# Patient Record
Sex: MALE | Race: WHITE | NOT HISPANIC OR LATINO | Employment: OTHER | URBAN - METROPOLITAN AREA
[De-identification: names, ages, dates, MRNs, and addresses within clinical notes are randomized per-mention and may not be internally consistent; named-entity substitution may affect disease eponyms.]

---

## 2019-08-01 ENCOUNTER — CLINICAL SUPPORT (OUTPATIENT)
Dept: CARDIAC REHAB | Facility: HOSPITAL | Age: 74
End: 2019-08-01

## 2019-08-01 DIAGNOSIS — Z78.9 PARTICIPANT IN HEALTH AND WELLNESS PLAN: ICD-10-CM

## 2019-08-01 PROCEDURE — 94300003 HC OP CARDIAC REHAB  - PHASE III SESSION

## 2019-08-07 ENCOUNTER — CLINICAL SUPPORT (OUTPATIENT)
Dept: CARDIAC REHAB | Facility: HOSPITAL | Age: 74
End: 2019-08-07

## 2019-08-09 ENCOUNTER — CLINICAL SUPPORT (OUTPATIENT)
Dept: CARDIAC REHAB | Facility: HOSPITAL | Age: 74
End: 2019-08-09

## 2019-08-12 ENCOUNTER — CLINICAL SUPPORT (OUTPATIENT)
Dept: CARDIAC REHAB | Facility: HOSPITAL | Age: 74
End: 2019-08-12

## 2019-08-12 DIAGNOSIS — Z78.9 PARTICIPANT IN HEALTH AND WELLNESS PLAN: Primary | ICD-10-CM

## 2019-08-19 ENCOUNTER — CLINICAL SUPPORT (OUTPATIENT)
Dept: CARDIAC REHAB | Facility: HOSPITAL | Age: 74
End: 2019-08-19

## 2019-08-19 DIAGNOSIS — Z78.9 PARTICIPANT IN HEALTH AND WELLNESS PLAN: Primary | ICD-10-CM

## 2019-08-23 ENCOUNTER — CLINICAL SUPPORT (OUTPATIENT)
Dept: CARDIAC REHAB | Facility: HOSPITAL | Age: 74
End: 2019-08-23

## 2019-08-23 DIAGNOSIS — Z78.9 PARTICIPANT IN HEALTH AND WELLNESS PLAN: Primary | ICD-10-CM

## 2019-08-23 PROCEDURE — 94300004 HC CARDIAC REHAB PHASE III, MONTHLY SESSION

## 2019-08-26 ENCOUNTER — CLINICAL SUPPORT (OUTPATIENT)
Dept: CARDIAC REHAB | Facility: HOSPITAL | Age: 74
End: 2019-08-26

## 2019-08-26 DIAGNOSIS — Z78.9 PARTICIPANT IN HEALTH AND WELLNESS PLAN: Primary | ICD-10-CM

## 2019-09-04 ENCOUNTER — CLINICAL SUPPORT (OUTPATIENT)
Dept: CARDIAC REHAB | Facility: HOSPITAL | Age: 74
End: 2019-09-04

## 2019-09-04 DIAGNOSIS — Z78.9 PARTICIPANT IN HEALTH AND WELLNESS PLAN: Primary | ICD-10-CM

## 2019-09-04 PROCEDURE — 94300004 HC CARDIAC REHAB PHASE III, MONTHLY SESSION

## 2019-09-09 ENCOUNTER — CLINICAL SUPPORT (OUTPATIENT)
Dept: CARDIAC REHAB | Facility: HOSPITAL | Age: 74
End: 2019-09-09

## 2019-09-09 DIAGNOSIS — Z78.9 PARTICIPANT IN HEALTH AND WELLNESS PLAN: Primary | ICD-10-CM

## 2019-10-04 ENCOUNTER — CLINICAL SUPPORT (OUTPATIENT)
Dept: FAMILY MEDICINE | Facility: CLINIC | Age: 74
End: 2019-10-04
Payer: MEDICARE

## 2019-10-04 VITALS — TEMPERATURE: 98 F

## 2019-10-04 DIAGNOSIS — Z23 FLU VACCINE NEED: Primary | ICD-10-CM

## 2019-10-04 PROCEDURE — 90662 FLU VACCINE - HIGH DOSE (65+) PRESERVATIVE FREE IM: ICD-10-PCS | Mod: S$GLB,,, | Performed by: FAMILY MEDICINE

## 2019-10-04 PROCEDURE — G0008 ADMIN INFLUENZA VIRUS VAC: HCPCS | Mod: S$GLB,,, | Performed by: FAMILY MEDICINE

## 2019-10-04 PROCEDURE — 90662 IIV NO PRSV INCREASED AG IM: CPT | Mod: S$GLB,,, | Performed by: FAMILY MEDICINE

## 2019-10-04 PROCEDURE — G0008 FLU VACCINE - HIGH DOSE (65+) PRESERVATIVE FREE IM: ICD-10-PCS | Mod: S$GLB,,, | Performed by: FAMILY MEDICINE

## 2019-10-07 ENCOUNTER — CLINICAL SUPPORT (OUTPATIENT)
Dept: CARDIAC REHAB | Facility: HOSPITAL | Age: 74
End: 2019-10-07

## 2019-10-07 DIAGNOSIS — Z78.9 PARTICIPANT IN HEALTH AND WELLNESS PLAN: ICD-10-CM

## 2019-10-07 PROCEDURE — 94300004 HC CARDIAC REHAB PHASE III, MONTHLY SESSION

## 2019-11-04 ENCOUNTER — CLINICAL SUPPORT (OUTPATIENT)
Dept: CARDIAC REHAB | Facility: HOSPITAL | Age: 74
End: 2019-11-04

## 2019-11-04 DIAGNOSIS — Z78.9 PARTICIPANT IN HEALTH AND WELLNESS PLAN: ICD-10-CM

## 2019-11-04 PROCEDURE — 94300004 HC CARDIAC REHAB PHASE III, MONTHLY SESSION

## 2019-12-06 ENCOUNTER — CLINICAL SUPPORT (OUTPATIENT)
Dept: CARDIAC REHAB | Facility: HOSPITAL | Age: 74
End: 2019-12-06

## 2019-12-06 DIAGNOSIS — Z78.9 PARTICIPANT IN HEALTH AND WELLNESS PLAN: ICD-10-CM

## 2019-12-06 PROCEDURE — 94300004 HC CARDIAC REHAB PHASE III, MONTHLY SESSION

## 2019-12-13 RX ORDER — OMEPRAZOLE 20 MG/1
20 CAPSULE, DELAYED RELEASE ORAL DAILY
Qty: 90 CAPSULE | Refills: 1 | Status: SHIPPED | OUTPATIENT
Start: 2019-12-13 | End: 2020-07-30 | Stop reason: SDUPTHER

## 2019-12-13 NOTE — TELEPHONE ENCOUNTER
----- Message from Doris Flores sent at 12/13/2019  9:49 AM CST -----  Contact: Jose  Refill omeprazole 20 mg # 90 with refills. [facility]. pts # [phone]

## 2020-01-08 ENCOUNTER — CLINICAL SUPPORT (OUTPATIENT)
Dept: CARDIAC REHAB | Facility: HOSPITAL | Age: 75
End: 2020-01-08

## 2020-01-08 DIAGNOSIS — Z78.9 PARTICIPANT IN HEALTH AND WELLNESS PLAN: ICD-10-CM

## 2020-01-08 PROCEDURE — 94300004 HC CARDIAC REHAB PHASE III, MONTHLY SESSION

## 2020-01-16 ENCOUNTER — PATIENT MESSAGE (OUTPATIENT)
Dept: FAMILY MEDICINE | Facility: CLINIC | Age: 75
End: 2020-01-16

## 2020-01-16 ENCOUNTER — TELEPHONE (OUTPATIENT)
Dept: FAMILY MEDICINE | Facility: CLINIC | Age: 75
End: 2020-01-16

## 2020-01-16 DIAGNOSIS — Z79.899 ENCOUNTER FOR LONG-TERM (CURRENT) USE OF OTHER MEDICATIONS: ICD-10-CM

## 2020-01-16 DIAGNOSIS — Z00.00 ROUTINE GENERAL MEDICAL EXAMINATION AT A HEALTH CARE FACILITY: Primary | ICD-10-CM

## 2020-01-16 DIAGNOSIS — Z12.5 SPECIAL SCREENING FOR MALIGNANT NEOPLASM OF PROSTATE: ICD-10-CM

## 2020-01-17 ENCOUNTER — OFFICE VISIT (OUTPATIENT)
Dept: FAMILY MEDICINE | Facility: CLINIC | Age: 75
End: 2020-01-17
Payer: MEDICARE

## 2020-01-17 VITALS
WEIGHT: 221.19 LBS | SYSTOLIC BLOOD PRESSURE: 118 MMHG | TEMPERATURE: 99 F | HEIGHT: 73 IN | HEART RATE: 72 BPM | DIASTOLIC BLOOD PRESSURE: 66 MMHG | BODY MASS INDEX: 29.31 KG/M2

## 2020-01-17 DIAGNOSIS — J10.1 INFLUENZA A: ICD-10-CM

## 2020-01-17 DIAGNOSIS — R68.89 FLU-LIKE SYMPTOMS: Primary | ICD-10-CM

## 2020-01-17 LAB
CTP QC/QA: YES
POC MOLECULAR INFLUENZA A AGN: POSITIVE
POC MOLECULAR INFLUENZA B AGN: NEGATIVE

## 2020-01-17 PROCEDURE — 99213 PR OFFICE/OUTPT VISIT, EST, LEVL III, 20-29 MIN: ICD-10-PCS | Mod: S$GLB,,, | Performed by: PHYSICIAN ASSISTANT

## 2020-01-17 PROCEDURE — 1125F AMNT PAIN NOTED PAIN PRSNT: CPT | Mod: S$GLB,,, | Performed by: PHYSICIAN ASSISTANT

## 2020-01-17 PROCEDURE — 87502 INFLUENZA DNA AMP PROBE: CPT | Mod: QW,,, | Performed by: PHYSICIAN ASSISTANT

## 2020-01-17 PROCEDURE — 1159F PR MEDICATION LIST DOCUMENTED IN MEDICAL RECORD: ICD-10-PCS | Mod: S$GLB,,, | Performed by: PHYSICIAN ASSISTANT

## 2020-01-17 PROCEDURE — 87502 POCT INFLUENZA A/B MOLECULAR: ICD-10-PCS | Mod: QW,,, | Performed by: PHYSICIAN ASSISTANT

## 2020-01-17 PROCEDURE — 1159F MED LIST DOCD IN RCRD: CPT | Mod: S$GLB,,, | Performed by: PHYSICIAN ASSISTANT

## 2020-01-17 PROCEDURE — 99213 OFFICE O/P EST LOW 20 MIN: CPT | Mod: S$GLB,,, | Performed by: PHYSICIAN ASSISTANT

## 2020-01-17 PROCEDURE — 1125F PR PAIN SEVERITY QUANTIFIED, PAIN PRESENT: ICD-10-PCS | Mod: S$GLB,,, | Performed by: PHYSICIAN ASSISTANT

## 2020-01-17 RX ORDER — ATENOLOL 25 MG/1
25 TABLET ORAL DAILY
COMMUNITY
Start: 2019-12-19 | End: 2020-09-02

## 2020-01-17 RX ORDER — MULTIVITAMIN
1 TABLET ORAL DAILY
COMMUNITY

## 2020-01-17 RX ORDER — OSELTAMIVIR PHOSPHATE 75 MG/1
75 CAPSULE ORAL 2 TIMES DAILY
Qty: 10 CAPSULE | Refills: 0 | Status: SHIPPED | OUTPATIENT
Start: 2020-01-17 | End: 2020-01-17 | Stop reason: SDUPTHER

## 2020-01-17 RX ORDER — TAMSULOSIN HYDROCHLORIDE 0.4 MG/1
0.4 CAPSULE ORAL DAILY
COMMUNITY
Start: 2019-10-24 | End: 2020-01-31 | Stop reason: SDUPTHER

## 2020-01-17 RX ORDER — TRIAMCINOLONE ACETONIDE 0.25 MG/G
CREAM TOPICAL 2 TIMES DAILY
COMMUNITY
End: 2021-01-27 | Stop reason: SDUPTHER

## 2020-01-17 RX ORDER — ROSUVASTATIN CALCIUM 20 MG/1
20 TABLET, COATED ORAL DAILY
COMMUNITY
Start: 2019-12-16 | End: 2020-11-16

## 2020-01-17 RX ORDER — PHENYLEPHRINE HCL 10 MG
1000 TABLET ORAL DAILY
COMMUNITY

## 2020-01-17 RX ORDER — ASPIRIN 325 MG
325 TABLET ORAL DAILY
COMMUNITY
End: 2022-08-04

## 2020-01-17 RX ORDER — OSELTAMIVIR PHOSPHATE 75 MG/1
75 CAPSULE ORAL 2 TIMES DAILY
Qty: 10 CAPSULE | Refills: 0 | Status: SHIPPED | OUTPATIENT
Start: 2020-01-17 | End: 2020-01-22

## 2020-01-17 RX ORDER — METRONIDAZOLE 7.5 MG/G
GEL TOPICAL 2 TIMES DAILY
COMMUNITY

## 2020-01-17 NOTE — TELEPHONE ENCOUNTER
----- Message from Elana Nicolas sent at 1/17/2020  9:23 AM CST -----  [facility]  his tamiflu they stated that they never received an rx for it

## 2020-01-17 NOTE — PATIENT INSTRUCTIONS

## 2020-01-17 NOTE — PROGRESS NOTES
SUBJECTIVE:    Patient ID: Jose Marti is a 74 y.o. male.    Chief Complaint: Cough (Pt reports dry cough, body aches, no appetite, runny nose and fever since Thrusday....recently exposed to the flu.....Med reconciled from list.....pharmacy verified.....[facility])     74-year-old white male presents for urgent visit.  He reports flu-like symptoms that include dry cough, body aches, reduced appetite, fever.  Symptoms started yesterday and he does admit positive sick contacts.  Grand kids have had the flu.  He is just taking fever reducing medication      No visits with results within 6 Month(s) from this visit.   Latest known visit with results is:   Orders Only on 06/24/2011   Component Date Value Ref Range Status    Stone Analysis 06/23/2011    Final                    Value:No nidus observed in this specimen.                                                    THE STONE IS COMPOSED OF:                          CALCIUM OXALATE MONOHYDRATE 100%       History reviewed. No pertinent past medical history.  History reviewed. No pertinent surgical history.  History reviewed. No pertinent family history.    Marital Status:   Alcohol History:  reports that he drinks alcohol.  Tobacco History:  reports that he has never smoked. He has never used smokeless tobacco.  Drug History:  reports that he does not use drugs.    Review of patient's allergies indicates:   Allergen Reactions    Metoprolol succinate Hives       Current Outpatient Medications:     aspirin 325 MG tablet, Take 325 mg by mouth once daily., Disp: , Rfl:     atenolol (TENORMIN) 25 MG tablet, Take 25 mg by mouth once daily., Disp: , Rfl:     cinnamon bark (CINNAMON) 500 mg capsule, Take 1,000 mg by mouth once daily., Disp: , Rfl:     metroNIDAZOLE (METROGEL) 0.75 % gel, Apply topically 2 (two) times daily., Disp: , Rfl:     multivitamin (THERAGRAN) per tablet, Take 1 tablet by mouth once daily., Disp: , Rfl:     omeprazole (PRILOSEC) 20 MG  "capsule, Take 1 capsule (20 mg total) by mouth once daily., Disp: 90 capsule, Rfl: 1    rosuvastatin (CRESTOR) 20 MG tablet, Take 20 mg by mouth once daily., Disp: , Rfl:     tamsulosin (FLOMAX) 0.4 mg Cap, Take 0.4 mg by mouth once daily., Disp: , Rfl:     triamcinolone acetonide 0.025% (KENALOG) 0.025 % cream, Apply topically 2 (two) times daily., Disp: , Rfl:     oseltamivir (TAMIFLU) 75 MG capsule, Take 1 capsule (75 mg total) by mouth 2 (two) times daily. for 5 days, Disp: 10 capsule, Rfl: 0    Review of Systems   Constitutional: Positive for chills, fatigue and fever.   HENT: Negative for congestion, ear discharge, ear pain, rhinorrhea, sinus pressure, sinus pain, sneezing, sore throat and trouble swallowing.    Eyes: Negative for pain, discharge, redness and itching.   Respiratory: Positive for cough. Negative for chest tightness and shortness of breath.    Cardiovascular: Negative for chest pain.   Gastrointestinal: Negative for abdominal distention and abdominal pain.   Neurological: Negative for weakness and headaches.          Objective:      Vitals:    01/17/20 0836   BP: 118/66   Pulse: 72   Temp: 98.9 °F (37.2 °C)   TempSrc: Oral   Weight: 100.3 kg (221 lb 3.2 oz)   Height: 6' 1" (1.854 m)     Physical Exam   Constitutional: He appears well-developed and well-nourished. No distress.   HENT:   Head: Normocephalic and atraumatic.   Eyes: Pupils are equal, round, and reactive to light. Conjunctivae and EOM are normal. Right eye exhibits no discharge. Left eye exhibits no discharge.   Neck: Normal range of motion. Neck supple. No thyromegaly present.   Cardiovascular: Normal rate, regular rhythm and normal heart sounds.   Pulmonary/Chest: Effort normal and breath sounds normal. No respiratory distress. He has no wheezes. He exhibits no tenderness.   Abdominal: Soft. Bowel sounds are normal.         Assessment:       1. Flu-like symptoms    2. Influenza A         Plan:       Flu-like symptoms  -     " POCT Influenza A/B Molecular    Influenza A  Comments:   rapid flu test is positive for flu A. he is early on in symptoms.  Will send in Tamiflu now.  Rest, push fluids, may alternate Tylenol Motrin for fever. Contact precautions discussed.  He is aware that after 5-6 days if his symptoms acutely worsen with regard to the cough or production we are concerned about pneumonia at that time and will let us know.  Orders:  -     oseltamivir (TAMIFLU) 75 MG capsule; Take 1 capsule (75 mg total) by mouth 2 (two) times daily. for 5 days  Dispense: 10 capsule; Refill: 0      Follow up if symptoms worsen or fail to improve.        1/17/2020 French Jade PA-C

## 2020-01-25 LAB
ALBUMIN SERPL-MCNC: 4.3 G/DL (ref 3.6–5.1)
ALBUMIN/GLOB SERPL: 1.5 (CALC) (ref 1–2.5)
ALP SERPL-CCNC: 65 U/L (ref 40–115)
ALT SERPL-CCNC: 27 U/L (ref 9–46)
APPEARANCE UR: CLEAR
AST SERPL-CCNC: 28 U/L (ref 10–35)
BACTERIA #/AREA URNS HPF: NORMAL /HPF
BACTERIA UR CULT: NORMAL
BASOPHILS # BLD AUTO: 0 CELLS/UL (ref 0–200)
BASOPHILS NFR BLD AUTO: 0 %
BILIRUB SERPL-MCNC: 1.4 MG/DL (ref 0.2–1.2)
BILIRUB UR QL STRIP: NEGATIVE
BUN SERPL-MCNC: 20 MG/DL (ref 7–25)
BUN/CREAT SERPL: ABNORMAL (CALC) (ref 6–22)
CALCIUM SERPL-MCNC: 9.7 MG/DL (ref 8.6–10.3)
CHLORIDE SERPL-SCNC: 102 MMOL/L (ref 98–110)
CHOLEST SERPL-MCNC: 131 MG/DL
CHOLEST/HDLC SERPL: 3.5 (CALC)
CO2 SERPL-SCNC: 30 MMOL/L (ref 20–32)
COLOR UR: YELLOW
CREAT SERPL-MCNC: 0.93 MG/DL (ref 0.7–1.18)
EOSINOPHIL # BLD AUTO: 150 CELLS/UL (ref 15–500)
EOSINOPHIL NFR BLD AUTO: 3.4 %
ERYTHROCYTE [DISTWIDTH] IN BLOOD BY AUTOMATED COUNT: 13.1 % (ref 11–15)
GFRSERPLBLD MDRD-ARVRAT: 81 ML/MIN/1.73M2
GLOBULIN SER CALC-MCNC: 2.8 G/DL (CALC) (ref 1.9–3.7)
GLUCOSE SERPL-MCNC: 95 MG/DL (ref 65–99)
GLUCOSE UR QL STRIP: NEGATIVE
HCT VFR BLD AUTO: 47.4 % (ref 38.5–50)
HDLC SERPL-MCNC: 37 MG/DL
HGB BLD-MCNC: 15.5 G/DL (ref 13.2–17.1)
HGB UR QL STRIP: NEGATIVE
HYALINE CASTS #/AREA URNS LPF: NORMAL /LPF
KETONES UR QL STRIP: NEGATIVE
LDLC SERPL CALC-MCNC: 68 MG/DL (CALC)
LEUKOCYTE ESTERASE UR QL STRIP: NEGATIVE
LYMPHOCYTES # BLD AUTO: 1725 CELLS/UL (ref 850–3900)
LYMPHOCYTES NFR BLD AUTO: 39.2 %
MCH RBC QN AUTO: 29.1 PG (ref 27–33)
MCHC RBC AUTO-ENTMCNC: 32.7 G/DL (ref 32–36)
MCV RBC AUTO: 89.1 FL (ref 80–100)
MONOCYTES # BLD AUTO: 405 CELLS/UL (ref 200–950)
MONOCYTES NFR BLD AUTO: 9.2 %
NEUTROPHILS # BLD AUTO: 2121 CELLS/UL (ref 1500–7800)
NEUTROPHILS NFR BLD AUTO: 48.2 %
NITRITE UR QL STRIP: NEGATIVE
NONHDLC SERPL-MCNC: 94 MG/DL (CALC)
PH UR STRIP: 6 [PH] (ref 5–8)
PLATELET # BLD AUTO: 223 THOUSAND/UL (ref 140–400)
PMV BLD REES-ECKER: 10.8 FL (ref 7.5–12.5)
POTASSIUM SERPL-SCNC: 4.6 MMOL/L (ref 3.5–5.3)
PROT SERPL-MCNC: 7.1 G/DL (ref 6.1–8.1)
PROT UR QL STRIP: NEGATIVE
PSA SERPL-MCNC: 1.3 NG/ML
RBC # BLD AUTO: 5.32 MILLION/UL (ref 4.2–5.8)
RBC #/AREA URNS HPF: NORMAL /HPF
SODIUM SERPL-SCNC: 140 MMOL/L (ref 135–146)
SP GR UR STRIP: 1.02 (ref 1–1.03)
SQUAMOUS #/AREA URNS HPF: NORMAL /HPF
TRIGL SERPL-MCNC: 191 MG/DL
TSH SERPL-ACNC: 1.15 MIU/L (ref 0.4–4.5)
WBC # BLD AUTO: 4.4 THOUSAND/UL (ref 3.8–10.8)
WBC #/AREA URNS HPF: NORMAL /HPF

## 2020-01-30 ENCOUNTER — OFFICE VISIT (OUTPATIENT)
Dept: FAMILY MEDICINE | Facility: CLINIC | Age: 75
End: 2020-01-30
Payer: MEDICARE

## 2020-01-30 VITALS
WEIGHT: 221 LBS | HEART RATE: 62 BPM | SYSTOLIC BLOOD PRESSURE: 116 MMHG | BODY MASS INDEX: 29.29 KG/M2 | HEIGHT: 73 IN | DIASTOLIC BLOOD PRESSURE: 72 MMHG

## 2020-01-30 DIAGNOSIS — I10 ESSENTIAL (PRIMARY) HYPERTENSION: Primary | ICD-10-CM

## 2020-01-30 DIAGNOSIS — E78.2 MIXED HYPERLIPIDEMIA: ICD-10-CM

## 2020-01-30 DIAGNOSIS — M75.40 IMPINGEMENT SYNDROME OF SHOULDER REGION, UNSPECIFIED LATERALITY: ICD-10-CM

## 2020-01-30 DIAGNOSIS — N40.0 ENLARGED PROSTATE WITHOUT LOWER URINARY TRACT SYMPTOMS (LUTS): ICD-10-CM

## 2020-01-30 DIAGNOSIS — M47.9 SPONDYLOSIS: ICD-10-CM

## 2020-01-30 DIAGNOSIS — Z95.1 HISTORY OF CORONARY ARTERY BYPASS GRAFT X 2: ICD-10-CM

## 2020-01-30 DIAGNOSIS — K21.9 GASTRO-ESOPHAGEAL REFLUX DISEASE WITHOUT ESOPHAGITIS: ICD-10-CM

## 2020-01-30 DIAGNOSIS — I25.118 ATHEROSCLEROTIC HEART DISEASE OF NATIVE CORONARY ARTERY WITH OTHER FORMS OF ANGINA PECTORIS: ICD-10-CM

## 2020-01-30 PROBLEM — E78.5 HYPERLIPIDEMIA, UNSPECIFIED: Status: ACTIVE | Noted: 2017-02-13

## 2020-01-30 PROCEDURE — 99214 PR OFFICE/OUTPT VISIT, EST, LEVL IV, 30-39 MIN: ICD-10-PCS | Mod: S$GLB,,, | Performed by: FAMILY MEDICINE

## 2020-01-30 PROCEDURE — 99214 OFFICE O/P EST MOD 30 MIN: CPT | Mod: S$GLB,,, | Performed by: FAMILY MEDICINE

## 2020-01-30 NOTE — PROGRESS NOTES
SUBJECTIVE:    Patient ID: Jose Marti is a 75 y.o. male.    Chief Complaint: Annual Exam (pt has list of meds tb)    This 75-year-old male status post coronary bypass graft 2 vessel several years ago.  Continues to exercise at the cardiac rehab program 2-3 days a week.  He rides the bike for 20 min and uses the treadmill for 25 min.  He has no chest pain or shortness of breath during his workouts.  His walking is unlimited.  Sees cardiologist Dr. Powell q.6 months.    His last colonoscopy was several years ago with Dr. Ramos.  We will obtain these records and define when his follow-up Should be,      Office Visit on 01/17/2020   Component Date Value Ref Range Status    POC Molecular Influenza A Ag 01/17/2020 Positive* Negative, Not Reported Final    POC Molecular Influenza B Ag 01/17/2020 Negative  Negative, Not Reported Final     Acceptable 01/17/2020 Yes   Final   Telephone on 01/16/2020   Component Date Value Ref Range Status    WBC 01/24/2020 4.4  3.8 - 10.8 Thousand/uL Final    RBC 01/24/2020 5.32  4.20 - 5.80 Million/uL Final    Hemoglobin 01/24/2020 15.5  13.2 - 17.1 g/dL Final    Hematocrit 01/24/2020 47.4  38.5 - 50.0 % Final    Mean Corpuscular Volume 01/24/2020 89.1  80.0 - 100.0 fL Final    Mean Corpuscular Hemoglobin 01/24/2020 29.1  27.0 - 33.0 pg Final    Mean Corpuscular Hemoglobin Conc 01/24/2020 32.7  32.0 - 36.0 g/dL Final    RDW 01/24/2020 13.1  11.0 - 15.0 % Final    Platelets 01/24/2020 223  140 - 400 Thousand/uL Final    MPV 01/24/2020 10.8  7.5 - 12.5 fL Final    Neutrophils Absolute 01/24/2020 2,121  1,500 - 7,800 cells/uL Final    Lymph # 01/24/2020 1,725  850 - 3,900 cells/uL Final    Mono # 01/24/2020 405  200 - 950 cells/uL Final    Eos # 01/24/2020 150  15 - 500 cells/uL Final    Baso # 01/24/2020 0  0 - 200 cells/uL Final    Neutrophils Relative 01/24/2020 48.2  % Final    Lymph% 01/24/2020 39.2  % Final    Mono% 01/24/2020 9.2  % Final     Eosinophil% 01/24/2020 3.4  % Final    Basophil% 01/24/2020 0.0  % Final    Glucose 01/24/2020 95  65 - 99 mg/dL Final    BUN, Bld 01/24/2020 20  7 - 25 mg/dL Final    Creatinine 01/24/2020 0.93  0.70 - 1.18 mg/dL Final    eGFR if non African American 01/24/2020 81  > OR = 60 mL/min/1.73m2 Final    eGFR if African American 01/24/2020 93  > OR = 60 mL/min/1.73m2 Final    BUN/Creatinine Ratio 01/24/2020 NOT APPLICABLE  6 - 22 (calc) Final    Sodium 01/24/2020 140  135 - 146 mmol/L Final    Potassium 01/24/2020 4.6  3.5 - 5.3 mmol/L Final    Chloride 01/24/2020 102  98 - 110 mmol/L Final    CO2 01/24/2020 30  20 - 32 mmol/L Final    Calcium 01/24/2020 9.7  8.6 - 10.3 mg/dL Final    Total Protein 01/24/2020 7.1  6.1 - 8.1 g/dL Final    Albumin 01/24/2020 4.3  3.6 - 5.1 g/dL Final    Globulin, Total 01/24/2020 2.8  1.9 - 3.7 g/dL (calc) Final    Albumin/Globulin Ratio 01/24/2020 1.5  1.0 - 2.5 (calc) Final    Total Bilirubin 01/24/2020 1.4* 0.2 - 1.2 mg/dL Final    Alkaline Phosphatase 01/24/2020 65  40 - 115 U/L Final    AST 01/24/2020 28  10 - 35 U/L Final    ALT 01/24/2020 27  9 - 46 U/L Final    Cholesterol 01/24/2020 131  <200 mg/dL Final    HDL 01/24/2020 37* >40 mg/dL Final    Triglycerides 01/24/2020 191* <150 mg/dL Final    LDL Cholesterol 01/24/2020 68  mg/dL (calc) Final    Hdl/Cholesterol Ratio 01/24/2020 3.5  <5.0 (calc) Final    Non HDL Chol. (LDL+VLDL) 01/24/2020 94  <130 mg/dL (calc) Final    TSH 01/24/2020 1.15  0.40 - 4.50 mIU/L Final    Color, UA 01/24/2020 YELLOW  YELLOW Final    Appearance, UA 01/24/2020 CLEAR  CLEAR Final    Specific [place], UA 01/24/2020 1.021  1.001 - 1.035 Final    pH, UA 01/24/2020 6.0  5.0 - 8.0 Final    Glucose, UA 01/24/2020 NEGATIVE  NEGATIVE Final    Bilirubin, UA 01/24/2020 NEGATIVE  NEGATIVE Final    Ketones, UA 01/24/2020 NEGATIVE  NEGATIVE Final    Occult Blood UA 01/24/2020 NEGATIVE  NEGATIVE Final    Protein, UA 01/24/2020  NEGATIVE  NEGATIVE Final    Nitrite, UA 01/24/2020 NEGATIVE  NEGATIVE Final    Leukocytes, UA 01/24/2020 NEGATIVE  NEGATIVE Final    WBC Casts, UA 01/24/2020 NONE SEEN  < OR = 5 /HPF Final    RBC Casts, UA 01/24/2020 0-2  < OR = 2 /HPF Final    Squam Epithel, UA 01/24/2020 NONE SEEN  < OR = 5 /HPF Final    Bacteria, UA 01/24/2020 NONE SEEN  NONE SEEN /HPF Final    Hyaline Casts, UA 01/24/2020 NONE SEEN  NONE SEEN /LPF Final    Reflexive Urine Culture 01/24/2020 NO CULTURE INDICATED   Final    PROSTATE SPECIFIC ANTIGEN, SCR - Q* 01/24/2020 1.3  < OR = 4.0 ng/mL Final       No past medical history on file.  Past Surgical History:   Procedure Laterality Date    BACK SURGERY  10/2018    Dr Menon-Lumbar  fusion L3-L5    CARDIAC SURGERY  2017    Dr [facility]- 2 vessel CABG    COLONOSCOPY      Dr Ramos    GALLBLADDER SURGERY      TONSILLECTOMY       No family history on file.    Marital Status:   Alcohol History:  reports that he drinks alcohol.  Tobacco History:  reports that he has never smoked. He has never used smokeless tobacco.  Drug History:  reports that he does not use drugs.    Review of patient's allergies indicates:   Allergen Reactions    Metoprolol succinate Hives       Current Outpatient Medications:     aspirin 325 MG tablet, Take 325 mg by mouth once daily., Disp: , Rfl:     atenolol (TENORMIN) 25 MG tablet, Take 25 mg by mouth once daily., Disp: , Rfl:     cinnamon bark (CINNAMON) 500 mg capsule, Take 1,000 mg by mouth once daily., Disp: , Rfl:     metroNIDAZOLE (METROGEL) 0.75 % gel, Apply topically 2 (two) times daily., Disp: , Rfl:     multivitamin (THERAGRAN) per tablet, Take 1 tablet by mouth once daily., Disp: , Rfl:     omeprazole (PRILOSEC) 20 MG capsule, Take 1 capsule (20 mg total) by mouth once daily., Disp: 90 capsule, Rfl: 1    rosuvastatin (CRESTOR) 20 MG tablet, Take 20 mg by mouth once daily., Disp: , Rfl:     tamsulosin (FLOMAX) 0.4 mg Cap, Take 0.4 mg  "by mouth once daily., Disp: , Rfl:     triamcinolone acetonide 0.025% (KENALOG) 0.025 % cream, Apply topically 2 (two) times daily., Disp: , Rfl:     Review of Systems   Constitutional: Negative for appetite change, chills, fatigue, fever and unexpected weight change.   HENT: Negative for congestion, ear pain and trouble swallowing.    Eyes: Negative for pain, discharge and visual disturbance.   Respiratory: Negative for apnea, cough, shortness of breath and wheezing.    Cardiovascular: Negative for chest pain and leg swelling.   Gastrointestinal: Negative for abdominal pain, blood in stool, constipation, diarrhea, nausea and vomiting.        Omeprazole 3-4 x a  Week works well   Endocrine: Negative for cold intolerance, heat intolerance and polydipsia.   Genitourinary: Positive for frequency. Negative for dysuria, hematuria, testicular pain and urgency.        On tamsulosin , nocturia  2-3  X a  Week, dribble   Musculoskeletal: Positive for arthralgias (shoulders  ache, can sleep on them at  night). Negative for gait problem, joint swelling and myalgias.   Skin:        Sees Dr Arora- derm   Neurological: Negative for dizziness, seizures and numbness.   Psychiatric/Behavioral: Negative for agitation, behavioral problems, hallucinations and sleep disturbance. The patient is not nervous/anxious.           Objective:      Vitals:    01/30/20 1431   BP: 116/72   Pulse: 62   Weight: 100.2 kg (221 lb)   Height: 6' 1" (1.854 m)     Body mass index is 29.16 kg/m².  Physical Exam   Constitutional: He is oriented to person, place, and time. He appears well-developed and well-nourished.   Elderly male in no apparent distress   HENT:   Head: Normocephalic and atraumatic.   Right Ear: External ear normal.   Left Ear: External ear normal.   Nose: Nose normal.   Mouth/Throat: Oropharynx is clear and moist.   Eyes: Pupils are equal, round, and reactive to light. EOM are normal.   Neck: Normal range of motion. Neck supple. " Carotid bruit is not present. No thyromegaly present.   Cardiovascular: Normal rate, regular rhythm, normal heart sounds and intact distal pulses.   No murmur heard.  Pulmonary/Chest: Effort normal and breath sounds normal. He has no wheezes. He has no rales.   Abdominal: Soft. Bowel sounds are normal. He exhibits no distension. There is no hepatosplenomegaly. There is no tenderness.   Musculoskeletal: Normal range of motion. He exhibits no tenderness or deformity.        Lumbar back: Normal. He exhibits no pain and no spasm.   Bends 90 degrees at  waist   Lymphadenopathy:     He has no cervical adenopathy.   Neurological: He is alert and oriented to person, place, and time. No cranial nerve deficit. Coordination normal.   Skin: Skin is warm and dry. No rash noted.   Psychiatric: He has a normal mood and affect. His behavior is normal. Judgment and thought content normal.   Nursing note and vitals reviewed.        Assessment:       1. Essential (primary) hypertension    2. Spondylosis    3. Atherosclerotic heart disease of native coronary artery with other forms of angina pectoris    4. Mixed hyperlipidemia    5. Enlarged prostate without lower urinary tract symptoms (luts)    6. Gastro-esophageal reflux disease without esophagitis    7. Impingement syndrome of shoulder region, unspecified laterality    8. History of coronary artery bypass graft x 2         Plan:       Essential (primary) hypertension  Blood pressure well controlled on current regimen  Spondylosis    Atherosclerotic heart disease of native coronary artery with other forms of angina pectoris    Mixed hyperlipidemia  -     Comprehensive metabolic panel; Future; Expected date: 01/30/2020  -     Lipid panel; Future; Expected date: 01/30/2020  Cholesterol was 131 which is excellent for heart patient.  Continue current statin drugs  Enlarged prostate without lower urinary tract symptoms (luts)    Gastro-esophageal reflux disease without  esophagitis    Impingement syndrome of shoulder region, unspecified laterality  He does not want any  shoulder surgery at this time continue range-of-motion exercises and conservative management.  History of coronary artery bypass graft x 2  Continue exercises cardiac rehab center    No follow-ups on file.

## 2020-01-31 ENCOUNTER — PATIENT MESSAGE (OUTPATIENT)
Dept: FAMILY MEDICINE | Facility: CLINIC | Age: 75
End: 2020-01-31

## 2020-01-31 RX ORDER — TAMSULOSIN HYDROCHLORIDE 0.4 MG/1
0.4 CAPSULE ORAL DAILY
Qty: 90 CAPSULE | Refills: 1 | Status: SHIPPED | OUTPATIENT
Start: 2020-01-31 | End: 2020-07-30 | Stop reason: SDUPTHER

## 2020-02-03 ENCOUNTER — CLINICAL SUPPORT (OUTPATIENT)
Dept: CARDIAC REHAB | Facility: HOSPITAL | Age: 75
End: 2020-02-03

## 2020-02-03 DIAGNOSIS — Z78.9 PARTICIPANT IN HEALTH AND WELLNESS PLAN: ICD-10-CM

## 2020-02-03 PROCEDURE — 94300004 HC CARDIAC REHAB PHASE III, MONTHLY SESSION

## 2020-02-17 DIAGNOSIS — I65.23 BILATERAL CAROTID ARTERY OCCLUSION: Primary | ICD-10-CM

## 2020-02-21 ENCOUNTER — HOSPITAL ENCOUNTER (OUTPATIENT)
Dept: RADIOLOGY | Facility: HOSPITAL | Age: 75
Discharge: HOME | End: 2020-02-21
Attending: INTERNAL MEDICINE
Payer: MEDICARE

## 2020-02-21 DIAGNOSIS — I65.23 BILATERAL CAROTID ARTERY OCCLUSION: ICD-10-CM

## 2020-02-21 PROCEDURE — 93880 EXTRACRANIAL BILAT STUDY: CPT | Mod: TC,PO

## 2020-03-09 ENCOUNTER — CLINICAL SUPPORT (OUTPATIENT)
Dept: CARDIAC REHAB | Facility: HOSPITAL | Age: 75
End: 2020-03-09
Attending: INTERNAL MEDICINE

## 2020-03-09 DIAGNOSIS — Z78.9 PARTICIPANT IN HEALTH AND WELLNESS PLAN: ICD-10-CM

## 2020-03-09 PROCEDURE — 94300004 HC CARDIAC REHAB PHASE III, MONTHLY SESSION

## 2020-07-23 ENCOUNTER — TELEPHONE (OUTPATIENT)
Dept: FAMILY MEDICINE | Facility: CLINIC | Age: 75
End: 2020-07-23

## 2020-07-23 NOTE — TELEPHONE ENCOUNTER
Spoke with pt who refused vv. Wants to come in office only. Patient was notified to bring all medication bottles to the visit, aware that we are not allowing family members at this time and aware they need to be wearing a mask to the visit.   Will use mobile check in.

## 2020-07-30 ENCOUNTER — OFFICE VISIT (OUTPATIENT)
Dept: FAMILY MEDICINE | Facility: CLINIC | Age: 75
End: 2020-07-30
Payer: MEDICARE

## 2020-07-30 VITALS
BODY MASS INDEX: 29.16 KG/M2 | HEIGHT: 73 IN | WEIGHT: 220 LBS | DIASTOLIC BLOOD PRESSURE: 86 MMHG | HEART RATE: 68 BPM | SYSTOLIC BLOOD PRESSURE: 138 MMHG | TEMPERATURE: 99 F

## 2020-07-30 DIAGNOSIS — I25.118 ATHEROSCLEROTIC HEART DISEASE OF NATIVE CORONARY ARTERY WITH OTHER FORMS OF ANGINA PECTORIS: ICD-10-CM

## 2020-07-30 DIAGNOSIS — K21.9 GASTRO-ESOPHAGEAL REFLUX DISEASE WITHOUT ESOPHAGITIS: ICD-10-CM

## 2020-07-30 DIAGNOSIS — Z95.1 HISTORY OF CORONARY ARTERY BYPASS GRAFT X 2: Primary | ICD-10-CM

## 2020-07-30 DIAGNOSIS — Z12.5 SPECIAL SCREENING FOR MALIGNANT NEOPLASM OF PROSTATE: ICD-10-CM

## 2020-07-30 DIAGNOSIS — N40.0 ENLARGED PROSTATE WITHOUT LOWER URINARY TRACT SYMPTOMS (LUTS): ICD-10-CM

## 2020-07-30 DIAGNOSIS — M47.9 SPONDYLOSIS: ICD-10-CM

## 2020-07-30 DIAGNOSIS — Z12.11 SPECIAL SCREENING FOR MALIGNANT NEOPLASMS, COLON: ICD-10-CM

## 2020-07-30 DIAGNOSIS — E78.2 MIXED HYPERLIPIDEMIA: ICD-10-CM

## 2020-07-30 DIAGNOSIS — M75.40 IMPINGEMENT SYNDROME OF SHOULDER REGION, UNSPECIFIED LATERALITY: ICD-10-CM

## 2020-07-30 DIAGNOSIS — I10 ESSENTIAL (PRIMARY) HYPERTENSION: ICD-10-CM

## 2020-07-30 PROCEDURE — 99214 OFFICE O/P EST MOD 30 MIN: CPT | Mod: S$GLB,,, | Performed by: FAMILY MEDICINE

## 2020-07-30 PROCEDURE — 99214 PR OFFICE/OUTPT VISIT, EST, LEVL IV, 30-39 MIN: ICD-10-PCS | Mod: S$GLB,,, | Performed by: FAMILY MEDICINE

## 2020-07-30 RX ORDER — OMEPRAZOLE 20 MG/1
20 CAPSULE, DELAYED RELEASE ORAL DAILY
Qty: 90 CAPSULE | Refills: 1 | Status: SHIPPED | OUTPATIENT
Start: 2020-07-30 | End: 2021-01-27 | Stop reason: SDUPTHER

## 2020-07-30 RX ORDER — TAMSULOSIN HYDROCHLORIDE 0.4 MG/1
0.4 CAPSULE ORAL DAILY
Qty: 90 CAPSULE | Refills: 1 | Status: SHIPPED | OUTPATIENT
Start: 2020-07-30 | End: 2021-01-27 | Stop reason: SDUPTHER

## 2020-07-30 RX ORDER — CYCLOBENZAPRINE HCL 10 MG
TABLET ORAL
COMMUNITY
Start: 2020-05-21

## 2020-07-30 NOTE — PROGRESS NOTES
SUBJECTIVE:    Patient ID: Jose Marti is a 75 y.o. male.    Chief Complaint: Follow-up (did not bring bottles / advised the importance. States he only wants refills on Flomax and Omeprazole // Colonoscopy - Due, agrees to do stool kit ac)    This 75-year-old male had a 2 vessel CABG in 2017.  Now followed by cardiologist Dr. velasco.  He is not been in cardiac rehab exercises lately due to the closure of the gym with COVID virus restrictions.  He has had a more sedentary lifestyle lately.  His walking is unlimited and he is able do light yd work.    2018-lumbar fusion L3 through L5 with Dr. tam Menon.  He is now pain free from his lumbar issues    Colonoscopy 2013 he was due for return in 5 years.  He agrees to now get a GI referral for are colonoscopy      No visits with results within 6 Month(s) from this visit.   Latest known visit with results is:   Office Visit on 01/17/2020   Component Date Value Ref Range Status    POC Molecular Influenza A Ag 01/17/2020 Positive* Negative, Not Reported Final    POC Molecular Influenza B Ag 01/17/2020 Negative  Negative, Not Reported Final     Acceptable 01/17/2020 Yes   Final       No past medical history on file.  Past Surgical History:   Procedure Laterality Date    BACK SURGERY  10/2018    Dr Menon-Lumbar  fusion L3-L5    CARDIAC SURGERY  2017    Dr MARK Marcial-[facility]- 2 vessel CABG    COLONOSCOPY  11/18/2013    Dr Ramos DUE 11/18/2018    GALLBLADDER SURGERY      TONSILLECTOMY       No family history on file.    Marital Status:   Alcohol History:  reports current alcohol use.  Tobacco History:  reports that he has never smoked. He has never used smokeless tobacco.  Drug History:  reports no history of drug use.    Review of patient's allergies indicates:   Allergen Reactions    Metoprolol succinate Hives       Current Outpatient Medications:     aspirin 325 MG tablet, Take 325 mg by mouth once daily., Disp: , Rfl:     atenolol  (TENORMIN) 25 MG tablet, Take 25 mg by mouth once daily., Disp: , Rfl:     cinnamon bark (CINNAMON) 500 mg capsule, Take 1,000 mg by mouth once daily., Disp: , Rfl:     cyclobenzaprine (FLEXERIL) 10 MG tablet, , Disp: , Rfl:     metroNIDAZOLE (METROGEL) 0.75 % gel, Apply topically 2 (two) times daily., Disp: , Rfl:     multivitamin (THERAGRAN) per tablet, Take 1 tablet by mouth once daily., Disp: , Rfl:     omeprazole (PRILOSEC) 20 MG capsule, Take 1 capsule (20 mg total) by mouth once daily., Disp: 90 capsule, Rfl: 1    rosuvastatin (CRESTOR) 20 MG tablet, Take 20 mg by mouth once daily., Disp: , Rfl:     tamsulosin (FLOMAX) 0.4 mg Cap, Take 1 capsule (0.4 mg total) by mouth once daily., Disp: 90 capsule, Rfl: 1    triamcinolone acetonide 0.025% (KENALOG) 0.025 % cream, Apply topically 2 (two) times daily., Disp: , Rfl:     Review of Systems   Constitutional: Negative for appetite change, chills, fatigue, fever and unexpected weight change.   HENT: Negative for congestion, ear pain and trouble swallowing.    Eyes: Negative for pain, discharge and visual disturbance.   Respiratory: Negative for apnea, cough, shortness of breath and wheezing.    Cardiovascular: Negative for chest pain and leg swelling.   Gastrointestinal: Negative for abdominal pain, blood in stool, constipation, diarrhea, nausea and vomiting.        Rare  gerd  , uses  Rolaids prn . On omeprazole 3 x a week   Endocrine: Negative for cold intolerance, heat intolerance and polydipsia.   Genitourinary: Negative for dysuria, hematuria, testicular pain and urgency (occas leaks).        Nocturia  2 x  nite   Musculoskeletal: Positive for arthralgias (left  hip aches ,shoulders  ache). Negative for back pain (doing well after  lumbar  fusion), gait problem, joint swelling and myalgias.   Neurological: Negative for dizziness, seizures and numbness.   Psychiatric/Behavioral: Negative for agitation, behavioral problems and hallucinations. The patient  "is not nervous/anxious.           Objective:      Vitals:    07/30/20 1444   BP: 138/86   Pulse: 68   Temp: 99 °F (37.2 °C)   Weight: 99.8 kg (220 lb)   Height: 6' 1" (1.854 m)     Body mass index is 29.03 kg/m².  Physical Exam  Vitals signs and nursing note reviewed.   Constitutional:       Appearance: He is well-developed.   HENT:      Head: Normocephalic and atraumatic.      Right Ear: External ear normal.      Left Ear: External ear normal.      Nose: Nose normal.   Eyes:      Pupils: Pupils are equal, round, and reactive to light.   Neck:      Musculoskeletal: Normal range of motion and neck supple.      Thyroid: No thyromegaly.      Vascular: No carotid bruit.   Cardiovascular:      Rate and Rhythm: Normal rate and regular rhythm.      Heart sounds: Normal heart sounds. No murmur.   Pulmonary:      Effort: Pulmonary effort is normal.      Breath sounds: Normal breath sounds. No wheezing or rales.   Abdominal:      General: Bowel sounds are normal. There is no distension.      Palpations: Abdomen is soft.      Tenderness: There is no abdominal tenderness.   Musculoskeletal: Normal range of motion.         General: No tenderness or deformity.      Lumbar back: Normal. He exhibits no pain and no spasm.      Comments: Bends 90 degrees at  Waist, shoulders and knees have full range of motion,, no peripheral edema is noted in the lower extremities.   Lymphadenopathy:      Cervical: No cervical adenopathy.   Skin:     General: Skin is warm and dry.      Findings: No rash.   Neurological:      Mental Status: He is alert and oriented to person, place, and time.      Cranial Nerves: No cranial nerve deficit.      Coordination: Coordination normal.   Psychiatric:         Behavior: Behavior normal.         Thought Content: Thought content normal.         Judgment: Judgment normal.           Assessment:       1. History of coronary artery bypass graft x 2    2. Special screening for malignant neoplasms, colon    3. " Enlarged prostate without lower urinary tract symptoms (luts)    4. Gastro-esophageal reflux disease without esophagitis    5. Spondylosis    6. Mixed hyperlipidemia    7. Essential (primary) hypertension    8. Atherosclerotic heart disease of native coronary artery with other forms of angina pectoris    9. Impingement syndrome of shoulder region, unspecified laterality         Plan:       History of coronary artery bypass graft x 2  Patient doing very well from a cardiac standpoint.  Special screening for malignant neoplasms, colon  -     Ambulatory referral/consult to Gastroenterology; Future; Expected date: 08/06/2020  Refer to GI for colonoscopy  Enlarged prostate without lower urinary tract symptoms (luts)  -     tamsulosin (FLOMAX) 0.4 mg Cap; Take 1 capsule (0.4 mg total) by mouth once daily.  Dispense: 90 capsule; Refill: 1  Refill tamsulosin  Gastro-esophageal reflux disease without esophagitis  -     omeprazole (PRILOSEC) 20 MG capsule; Take 1 capsule (20 mg total) by mouth once daily.  Dispense: 90 capsule; Refill: 1  Omeprazole refill  Spondylosis  Back is pain-free at this time  Mixed hyperlipidemia  Cholesterol 152 HDL 48  LDL 76, excellent lipid panel  Essential (primary) hypertension  Blood pressure well controlled  Atherosclerotic heart disease of native coronary artery with other forms of angina pectoris    Impingement syndrome of shoulder region, unspecified laterality  He has minor pains and is not willing to do surgery or invasive procedures at this time    Follow up in about 6 months (around 1/30/2021).

## 2020-09-02 ENCOUNTER — CLINICAL SUPPORT (OUTPATIENT)
Dept: FAMILY MEDICINE | Facility: CLINIC | Age: 75
End: 2020-09-02
Payer: MEDICARE

## 2020-09-02 DIAGNOSIS — Z23 NEED FOR INFLUENZA VACCINATION: Primary | ICD-10-CM

## 2020-09-02 PROCEDURE — 90662 IIV NO PRSV INCREASED AG IM: CPT | Mod: S$GLB,,, | Performed by: NURSE PRACTITIONER

## 2020-09-02 PROCEDURE — G0008 ADMIN INFLUENZA VIRUS VAC: HCPCS | Mod: S$GLB,,, | Performed by: NURSE PRACTITIONER

## 2020-09-02 PROCEDURE — 90662 FLU VACCINE - QUADRIVALENT - HIGH DOSE (65+) PRESERVATIVE FREE IM: ICD-10-PCS | Mod: S$GLB,,, | Performed by: NURSE PRACTITIONER

## 2020-09-02 PROCEDURE — G0008 FLU VACCINE - QUADRIVALENT - HIGH DOSE (65+) PRESERVATIVE FREE IM: ICD-10-PCS | Mod: S$GLB,,, | Performed by: NURSE PRACTITIONER

## 2020-10-19 ENCOUNTER — OFFICE VISIT (OUTPATIENT)
Dept: CARDIOLOGY | Facility: CLINIC | Age: 75
End: 2020-10-19
Payer: MEDICARE

## 2020-10-19 VITALS
OXYGEN SATURATION: 98 % | DIASTOLIC BLOOD PRESSURE: 84 MMHG | RESPIRATION RATE: 18 BRPM | HEIGHT: 73 IN | SYSTOLIC BLOOD PRESSURE: 126 MMHG | BODY MASS INDEX: 29.82 KG/M2 | HEART RATE: 64 BPM | WEIGHT: 225 LBS

## 2020-10-19 DIAGNOSIS — I25.10 CORONARY ARTERY DISEASE INVOLVING NATIVE CORONARY ARTERY OF NATIVE HEART WITHOUT ANGINA PECTORIS: Primary | ICD-10-CM

## 2020-10-19 DIAGNOSIS — I10 ESSENTIAL HYPERTENSION: ICD-10-CM

## 2020-10-19 DIAGNOSIS — E78.2 MIXED HYPERLIPIDEMIA: ICD-10-CM

## 2020-10-19 PROCEDURE — 99212 OFFICE O/P EST SF 10 MIN: CPT | Mod: S$GLB,,, | Performed by: INTERNAL MEDICINE

## 2020-10-19 PROCEDURE — 99212 PR OFFICE/OUTPT VISIT, EST, LEVL II, 10-19 MIN: ICD-10-PCS | Mod: S$GLB,,, | Performed by: INTERNAL MEDICINE

## 2020-10-19 NOTE — PROGRESS NOTES
Subjective:    Patient ID:  Jose Marti is a 75 y.o. male who presents for   Follow-up (no labs, no test)    No complaints    Follow-up  Pertinent negatives include no chest pain, congestion, coughing, myalgias, nausea, rash or sore throat.       Review of patient's allergies indicates:   Allergen Reactions    Metoprolol succinate Hives       History reviewed. No pertinent past medical history.  Past Surgical History:   Procedure Laterality Date    BACK SURGERY  10/2018    Dr Menon-Lumbar  fusion L3-L5    CARDIAC SURGERY  2017    Dr MARK Marcial-[facility]- 2 vessel CABG    COLONOSCOPY  11/18/2013    Dr Ramos DUE 11/18/2018    GALLBLADDER SURGERY      TONSILLECTOMY       Social History     Tobacco Use    Smoking status: Never Smoker    Smokeless tobacco: Never Used   Substance Use Topics    Alcohol use: Yes    Drug use: Never        Review of Systems     Review of Systems   Constitution: Negative for weight gain and weight loss.   HENT: Negative for congestion, nosebleeds and sore throat.    Eyes: Negative for visual disturbance.   Cardiovascular: Negative for chest pain, dyspnea on exertion, palpitations and syncope.   Respiratory: Negative for cough, shortness of breath and wheezing.    Skin: Negative for rash.   Musculoskeletal: Negative for back pain, gout, joint pain and myalgias.   Gastrointestinal: Negative for heartburn, hematochezia and nausea.   Genitourinary: Negative for frequency, hematuria and nocturia.   Neurological: Negative for dizziness and tremors.   Psychiatric/Behavioral: Negative for memory loss.   Allergic/Immunologic: Negative for environmental allergies (Seasonal Allergies).           Objective:        Vitals:    10/19/20 1202   BP: 126/84   Pulse: 64   Resp: 18       Lab Results   Component Value Date    WBC 4.4 01/24/2020    HGB 15.5 01/24/2020    HCT 47.4 01/24/2020     01/24/2020    CHOL 131 01/24/2020    TRIG 191 (H) 01/24/2020    HDL 37 (L) 01/24/2020    ALT 27 01/24/2020     AST 28 01/24/2020     01/24/2020    K 4.6 01/24/2020     01/24/2020    CREATININE 0.93 01/24/2020    BUN 20 01/24/2020    CO2 30 01/24/2020    TSH 1.15 01/24/2020    PSA 0.70 05/15/2009        ECHOCARDIOGRAM RESULTS  No results found for this or any previous visit.    CURRENT/PREVIOUS VISIT EKG  No results found for this or any previous visit.  No results found for this or any previous visit.  No results found for this or any previous visit.    PHYSICAL EXAM    Physical Exam   Constitutional: He is oriented to person, place, and time. He appears well-developed and well-nourished.   HENT:   Head: Normocephalic and atraumatic.   Cardiovascular: Normal rate, regular rhythm, normal heart sounds and intact distal pulses. Exam reveals no gallop and no friction rub.   No murmur heard.  Pulmonary/Chest: Effort normal and breath sounds normal.   Neurological: He is alert and oriented to person, place, and time.   Psychiatric: He has a normal mood and affect. His behavior is normal. Judgment and thought content normal.        Medication List with Changes/Refills   Current Medications    ASPIRIN 325 MG TABLET    Take 325 mg by mouth once daily.    ATENOLOL (TENORMIN) 25 MG TABLET    TAKE 1 TABLET DAILY    CINNAMON BARK (CINNAMON) 500 MG CAPSULE    Take 1,000 mg by mouth once daily.    CYCLOBENZAPRINE (FLEXERIL) 10 MG TABLET        METRONIDAZOLE (METROGEL) 0.75 % GEL    Apply topically 2 (two) times daily.    MULTIVITAMIN (THERAGRAN) PER TABLET    Take 1 tablet by mouth once daily.    OMEPRAZOLE (PRILOSEC) 20 MG CAPSULE    Take 1 capsule (20 mg total) by mouth once daily.    ROSUVASTATIN (CRESTOR) 20 MG TABLET    Take 20 mg by mouth once daily.    TAMSULOSIN (FLOMAX) 0.4 MG CAP    Take 1 capsule (0.4 mg total) by mouth once daily.    TRIAMCINOLONE ACETONIDE 0.025% (KENALOG) 0.025 % CREAM    Apply topically 2 (two) times daily.           Assessment:       1. Coronary artery disease involving native coronary artery  of native heart without angina pectoris    2. Essential hypertension    3. Mixed hyperlipidemia         Plan:  Status post 2 vessel bypass surgery.  LDL is at goal I have encouraged him to exercise more he does do his yd but has not exercised as much as he is not working at the car [employer] place follow-up will be in 6 months       Problem List Items Addressed This Visit        Cardiac/Vascular    Atherosclerotic heart disease of native coronary artery with other forms of angina pectoris - Primary    Hyperlipidemia, unspecified      Other Visit Diagnoses     Essential hypertension               Follow up in about 6 months (around 4/19/2021).

## 2021-01-15 ENCOUNTER — IMMUNIZATION (OUTPATIENT)
Dept: FAMILY MEDICINE | Facility: CLINIC | Age: 76
End: 2021-01-15
Payer: MEDICARE

## 2021-01-15 DIAGNOSIS — Z23 NEED FOR VACCINATION: Primary | ICD-10-CM

## 2021-01-15 PROCEDURE — 91300 COVID-19, MRNA, LNP-S, PF, 30 MCG/0.3 ML DOSE VACCINE: ICD-10-PCS | Mod: ,,, | Performed by: FAMILY MEDICINE

## 2021-01-15 PROCEDURE — 91300 COVID-19, MRNA, LNP-S, PF, 30 MCG/0.3 ML DOSE VACCINE: CPT | Mod: ,,, | Performed by: FAMILY MEDICINE

## 2021-01-15 PROCEDURE — 0001A COVID-19, MRNA, LNP-S, PF, 30 MCG/0.3 ML DOSE VACCINE: CPT | Mod: CV19,,, | Performed by: FAMILY MEDICINE

## 2021-01-15 PROCEDURE — 0001A COVID-19, MRNA, LNP-S, PF, 30 MCG/0.3 ML DOSE VACCINE: ICD-10-PCS | Mod: CV19,,, | Performed by: FAMILY MEDICINE

## 2021-01-21 ENCOUNTER — TELEPHONE (OUTPATIENT)
Dept: FAMILY MEDICINE | Facility: CLINIC | Age: 76
End: 2021-01-21

## 2021-01-23 LAB
ALBUMIN SERPL-MCNC: 4.3 G/DL (ref 3.6–5.1)
ALBUMIN/GLOB SERPL: 2 (CALC) (ref 1–2.5)
ALP SERPL-CCNC: 55 U/L (ref 35–144)
ALT SERPL-CCNC: 22 U/L (ref 9–46)
AST SERPL-CCNC: 26 U/L (ref 10–35)
BASOPHILS # BLD AUTO: 0 CELLS/UL (ref 0–200)
BASOPHILS NFR BLD AUTO: 0 %
BILIRUB SERPL-MCNC: 2 MG/DL (ref 0.2–1.2)
BUN SERPL-MCNC: 17 MG/DL (ref 7–25)
BUN/CREAT SERPL: ABNORMAL (CALC) (ref 6–22)
CALCIUM SERPL-MCNC: 9.3 MG/DL (ref 8.6–10.3)
CHLORIDE SERPL-SCNC: 106 MMOL/L (ref 98–110)
CHOLEST SERPL-MCNC: 136 MG/DL
CHOLEST/HDLC SERPL: 2.7 (CALC)
CO2 SERPL-SCNC: 30 MMOL/L (ref 20–32)
CREAT SERPL-MCNC: 0.94 MG/DL (ref 0.7–1.18)
EOSINOPHIL # BLD AUTO: 78 CELLS/UL (ref 15–500)
EOSINOPHIL NFR BLD AUTO: 2.8 %
ERYTHROCYTE [DISTWIDTH] IN BLOOD BY AUTOMATED COUNT: 13 % (ref 11–15)
GFRSERPLBLD MDRD-ARVRAT: 79 ML/MIN/1.73M2
GLOBULIN SER CALC-MCNC: 2.2 G/DL (CALC) (ref 1.9–3.7)
GLUCOSE SERPL-MCNC: 101 MG/DL (ref 65–99)
HCT VFR BLD AUTO: 42.6 % (ref 38.5–50)
HDLC SERPL-MCNC: 51 MG/DL
HGB BLD-MCNC: 14.5 G/DL (ref 13.2–17.1)
LDLC SERPL CALC-MCNC: 62 MG/DL (CALC)
LYMPHOCYTES # BLD AUTO: 1207 CELLS/UL (ref 850–3900)
LYMPHOCYTES NFR BLD AUTO: 43.1 %
MCH RBC QN AUTO: 31 PG (ref 27–33)
MCHC RBC AUTO-ENTMCNC: 34 G/DL (ref 32–36)
MCV RBC AUTO: 91.2 FL (ref 80–100)
MONOCYTES # BLD AUTO: 260 CELLS/UL (ref 200–950)
MONOCYTES NFR BLD AUTO: 9.3 %
NEUTROPHILS # BLD AUTO: 1254 CELLS/UL (ref 1500–7800)
NEUTROPHILS NFR BLD AUTO: 44.8 %
NONHDLC SERPL-MCNC: 85 MG/DL (CALC)
PLATELET # BLD AUTO: 146 THOUSAND/UL (ref 140–400)
PMV BLD REES-ECKER: 11 FL (ref 7.5–12.5)
POTASSIUM SERPL-SCNC: 4.3 MMOL/L (ref 3.5–5.3)
PROT SERPL-MCNC: 6.5 G/DL (ref 6.1–8.1)
PSA SERPL-MCNC: 1.3 NG/ML
RBC # BLD AUTO: 4.67 MILLION/UL (ref 4.2–5.8)
SODIUM SERPL-SCNC: 142 MMOL/L (ref 135–146)
TRIGL SERPL-MCNC: 142 MG/DL
TSH SERPL-ACNC: 1.56 MIU/L (ref 0.4–4.5)
WBC # BLD AUTO: 2.8 THOUSAND/UL (ref 3.8–10.8)

## 2021-01-25 ENCOUNTER — TELEPHONE (OUTPATIENT)
Dept: FAMILY MEDICINE | Facility: CLINIC | Age: 76
End: 2021-01-25

## 2021-01-27 ENCOUNTER — OFFICE VISIT (OUTPATIENT)
Dept: FAMILY MEDICINE | Facility: CLINIC | Age: 76
End: 2021-01-27
Payer: MEDICARE

## 2021-01-27 VITALS
HEIGHT: 73 IN | DIASTOLIC BLOOD PRESSURE: 80 MMHG | BODY MASS INDEX: 29.55 KG/M2 | SYSTOLIC BLOOD PRESSURE: 126 MMHG | WEIGHT: 223 LBS | HEART RATE: 80 BPM

## 2021-01-27 DIAGNOSIS — E78.2 MIXED HYPERLIPIDEMIA: ICD-10-CM

## 2021-01-27 DIAGNOSIS — M47.9 SPONDYLOSIS: ICD-10-CM

## 2021-01-27 DIAGNOSIS — M75.41 IMPINGEMENT SYNDROME OF RIGHT SHOULDER: ICD-10-CM

## 2021-01-27 DIAGNOSIS — H61.23 BILATERAL HEARING LOSS DUE TO CERUMEN IMPACTION: ICD-10-CM

## 2021-01-27 DIAGNOSIS — Z95.1 HISTORY OF CORONARY ARTERY BYPASS GRAFT X 2: Primary | ICD-10-CM

## 2021-01-27 DIAGNOSIS — K21.9 GASTRO-ESOPHAGEAL REFLUX DISEASE WITHOUT ESOPHAGITIS: ICD-10-CM

## 2021-01-27 DIAGNOSIS — I25.118 ATHEROSCLEROTIC HEART DISEASE OF NATIVE CORONARY ARTERY WITH OTHER FORMS OF ANGINA PECTORIS: ICD-10-CM

## 2021-01-27 DIAGNOSIS — L20.84 INTRINSIC ECZEMA: ICD-10-CM

## 2021-01-27 DIAGNOSIS — I10 ESSENTIAL (PRIMARY) HYPERTENSION: ICD-10-CM

## 2021-01-27 DIAGNOSIS — N40.0 ENLARGED PROSTATE WITHOUT LOWER URINARY TRACT SYMPTOMS (LUTS): ICD-10-CM

## 2021-01-27 PROCEDURE — 69209 REMOVE IMPACTED EAR WAX UNI: CPT | Mod: 50,S$GLB,, | Performed by: FAMILY MEDICINE

## 2021-01-27 PROCEDURE — 69209 EAR CERUMEN REMOVAL: ICD-10-PCS | Mod: 50,S$GLB,, | Performed by: FAMILY MEDICINE

## 2021-01-27 PROCEDURE — 99214 OFFICE O/P EST MOD 30 MIN: CPT | Mod: 25,S$GLB,, | Performed by: FAMILY MEDICINE

## 2021-01-27 PROCEDURE — 99214 PR OFFICE/OUTPT VISIT, EST, LEVL IV, 30-39 MIN: ICD-10-PCS | Mod: 25,S$GLB,, | Performed by: FAMILY MEDICINE

## 2021-01-27 RX ORDER — OMEPRAZOLE 20 MG/1
20 CAPSULE, DELAYED RELEASE ORAL DAILY
Qty: 90 CAPSULE | Refills: 1 | Status: SHIPPED | OUTPATIENT
Start: 2021-01-27 | End: 2022-08-04 | Stop reason: SDUPTHER

## 2021-01-27 RX ORDER — TRIAMCINOLONE ACETONIDE 0.25 MG/G
CREAM TOPICAL 2 TIMES DAILY
Qty: 30 G | Refills: 2 | Status: SHIPPED | OUTPATIENT
Start: 2021-01-27

## 2021-01-27 RX ORDER — GLUCOSAM/CHONDRO/HERB 149/HYAL 750-100 MG
TABLET ORAL
COMMUNITY
Start: 2020-07-21

## 2021-01-27 RX ORDER — CLINDAMYCIN PHOSPHATE 11.9 MG/ML
SOLUTION TOPICAL 2 TIMES DAILY
COMMUNITY
Start: 2020-11-18 | End: 2021-05-05

## 2021-01-27 RX ORDER — TAMSULOSIN HYDROCHLORIDE 0.4 MG/1
0.4 CAPSULE ORAL DAILY
Qty: 90 CAPSULE | Refills: 1 | Status: SHIPPED | OUTPATIENT
Start: 2021-01-27 | End: 2021-07-29 | Stop reason: SDUPTHER

## 2021-01-27 RX ORDER — GABAPENTIN 100 MG/1
100 CAPSULE ORAL NIGHTLY
COMMUNITY
Start: 2021-01-11 | End: 2022-06-14

## 2021-02-05 ENCOUNTER — IMMUNIZATION (OUTPATIENT)
Dept: FAMILY MEDICINE | Facility: CLINIC | Age: 76
End: 2021-02-05
Payer: MEDICARE

## 2021-02-05 DIAGNOSIS — Z23 NEED FOR VACCINATION: Primary | ICD-10-CM

## 2021-02-05 PROCEDURE — 0002A COVID-19, MRNA, LNP-S, PF, 30 MCG/0.3 ML DOSE VACCINE: CPT | Mod: CV19,,, | Performed by: FAMILY MEDICINE

## 2021-02-05 PROCEDURE — 91300 COVID-19, MRNA, LNP-S, PF, 30 MCG/0.3 ML DOSE VACCINE: ICD-10-PCS | Mod: ,,, | Performed by: FAMILY MEDICINE

## 2021-02-05 PROCEDURE — 0002A COVID-19, MRNA, LNP-S, PF, 30 MCG/0.3 ML DOSE VACCINE: ICD-10-PCS | Mod: CV19,,, | Performed by: FAMILY MEDICINE

## 2021-02-05 PROCEDURE — 91300 COVID-19, MRNA, LNP-S, PF, 30 MCG/0.3 ML DOSE VACCINE: CPT | Mod: ,,, | Performed by: FAMILY MEDICINE

## 2021-03-25 ENCOUNTER — TELEPHONE (OUTPATIENT)
Dept: CARDIOLOGY | Facility: CLINIC | Age: 76
End: 2021-03-25

## 2021-04-01 RX ORDER — LOSARTAN POTASSIUM 25 MG/1
25 TABLET ORAL DAILY
COMMUNITY
End: 2021-05-05 | Stop reason: SDUPTHER

## 2021-04-01 RX ORDER — LOSARTAN POTASSIUM 25 MG/1
25 TABLET ORAL DAILY
Qty: 30 TABLET | Refills: 1 | Status: CANCELLED | OUTPATIENT
Start: 2021-04-01

## 2021-05-05 ENCOUNTER — OFFICE VISIT (OUTPATIENT)
Dept: CARDIOLOGY | Facility: CLINIC | Age: 76
End: 2021-05-05
Payer: MEDICARE

## 2021-05-05 VITALS
HEIGHT: 73 IN | RESPIRATION RATE: 18 BRPM | SYSTOLIC BLOOD PRESSURE: 148 MMHG | HEART RATE: 69 BPM | BODY MASS INDEX: 29.16 KG/M2 | OXYGEN SATURATION: 98 % | DIASTOLIC BLOOD PRESSURE: 88 MMHG | WEIGHT: 220 LBS

## 2021-05-05 DIAGNOSIS — I34.0 MITRAL VALVE INSUFFICIENCY, UNSPECIFIED ETIOLOGY: ICD-10-CM

## 2021-05-05 DIAGNOSIS — E78.2 MIXED HYPERLIPIDEMIA: ICD-10-CM

## 2021-05-05 DIAGNOSIS — Z95.1 HISTORY OF CORONARY ARTERY BYPASS GRAFT X 2: ICD-10-CM

## 2021-05-05 DIAGNOSIS — I36.1 NONRHEUMATIC TRICUSPID VALVE REGURGITATION: ICD-10-CM

## 2021-05-05 DIAGNOSIS — I27.20 PULMONARY HTN: ICD-10-CM

## 2021-05-05 DIAGNOSIS — I25.118 ATHEROSCLEROTIC HEART DISEASE OF NATIVE CORONARY ARTERY WITH OTHER FORMS OF ANGINA PECTORIS: ICD-10-CM

## 2021-05-05 DIAGNOSIS — I10 ESSENTIAL (PRIMARY) HYPERTENSION: Primary | ICD-10-CM

## 2021-05-05 PROCEDURE — 99214 PR OFFICE/OUTPT VISIT, EST, LEVL IV, 30-39 MIN: ICD-10-PCS | Mod: S$GLB,,, | Performed by: INTERNAL MEDICINE

## 2021-05-05 PROCEDURE — 99214 OFFICE O/P EST MOD 30 MIN: CPT | Mod: S$GLB,,, | Performed by: INTERNAL MEDICINE

## 2021-05-05 PROCEDURE — 93005 EKG 12-LEAD: ICD-10-PCS | Mod: S$GLB,,, | Performed by: INTERNAL MEDICINE

## 2021-05-05 PROCEDURE — 93005 ELECTROCARDIOGRAM TRACING: CPT | Mod: S$GLB,,, | Performed by: INTERNAL MEDICINE

## 2021-05-05 RX ORDER — LOSARTAN POTASSIUM 25 MG/1
25 TABLET ORAL DAILY
Qty: 90 TABLET | Refills: 3 | Status: SHIPPED | OUTPATIENT
Start: 2021-05-05 | End: 2022-03-21

## 2021-05-11 ENCOUNTER — OFFICE VISIT (OUTPATIENT)
Dept: FAMILY MEDICINE | Facility: CLINIC | Age: 76
End: 2021-05-11
Payer: MEDICARE

## 2021-05-11 VITALS
DIASTOLIC BLOOD PRESSURE: 80 MMHG | SYSTOLIC BLOOD PRESSURE: 138 MMHG | BODY MASS INDEX: 29.29 KG/M2 | HEIGHT: 73 IN | WEIGHT: 221 LBS | HEART RATE: 68 BPM

## 2021-05-11 DIAGNOSIS — S46.212A BICEPS MUSCLE TEAR, LEFT, INITIAL ENCOUNTER: Primary | ICD-10-CM

## 2021-05-11 PROCEDURE — 99213 OFFICE O/P EST LOW 20 MIN: CPT | Mod: S$GLB,,, | Performed by: PHYSICIAN ASSISTANT

## 2021-05-11 PROCEDURE — 99213 PR OFFICE/OUTPT VISIT, EST, LEVL III, 20-29 MIN: ICD-10-PCS | Mod: S$GLB,,, | Performed by: PHYSICIAN ASSISTANT

## 2021-07-15 ENCOUNTER — TELEPHONE (OUTPATIENT)
Dept: FAMILY MEDICINE | Facility: CLINIC | Age: 76
End: 2021-07-15

## 2021-07-16 ENCOUNTER — PATIENT MESSAGE (OUTPATIENT)
Dept: FAMILY MEDICINE | Facility: CLINIC | Age: 76
End: 2021-07-16

## 2021-07-20 LAB
ALBUMIN SERPL-MCNC: 4.1 G/DL (ref 3.6–5.1)
ALBUMIN/GLOB SERPL: 2.1 (CALC) (ref 1–2.5)
ALP SERPL-CCNC: 51 U/L (ref 35–144)
ALT SERPL-CCNC: 21 U/L (ref 9–46)
AST SERPL-CCNC: 21 U/L (ref 10–35)
BILIRUB SERPL-MCNC: 1.5 MG/DL (ref 0.2–1.2)
BUN SERPL-MCNC: 16 MG/DL (ref 7–25)
BUN/CREAT SERPL: ABNORMAL (CALC) (ref 6–22)
CALCIUM SERPL-MCNC: 9.3 MG/DL (ref 8.6–10.3)
CHLORIDE SERPL-SCNC: 107 MMOL/L (ref 98–110)
CHOLEST SERPL-MCNC: 137 MG/DL
CHOLEST/HDLC SERPL: 2.7 (CALC)
CO2 SERPL-SCNC: 25 MMOL/L (ref 20–32)
CREAT SERPL-MCNC: 0.84 MG/DL (ref 0.7–1.18)
GLOBULIN SER CALC-MCNC: 2 G/DL (CALC) (ref 1.9–3.7)
GLUCOSE SERPL-MCNC: 101 MG/DL (ref 65–99)
HDLC SERPL-MCNC: 51 MG/DL
LDLC SERPL CALC-MCNC: 66 MG/DL (CALC)
NONHDLC SERPL-MCNC: 86 MG/DL (CALC)
POTASSIUM SERPL-SCNC: 4.3 MMOL/L (ref 3.5–5.3)
PROT SERPL-MCNC: 6.1 G/DL (ref 6.1–8.1)
SODIUM SERPL-SCNC: 139 MMOL/L (ref 135–146)
TRIGL SERPL-MCNC: 115 MG/DL

## 2021-07-29 ENCOUNTER — OFFICE VISIT (OUTPATIENT)
Dept: FAMILY MEDICINE | Facility: CLINIC | Age: 76
End: 2021-07-29
Payer: MEDICARE

## 2021-07-29 VITALS
HEIGHT: 73 IN | WEIGHT: 220 LBS | SYSTOLIC BLOOD PRESSURE: 100 MMHG | BODY MASS INDEX: 29.16 KG/M2 | DIASTOLIC BLOOD PRESSURE: 64 MMHG | HEART RATE: 68 BPM

## 2021-07-29 DIAGNOSIS — I25.118 ATHEROSCLEROTIC HEART DISEASE OF NATIVE CORONARY ARTERY WITH OTHER FORMS OF ANGINA PECTORIS: ICD-10-CM

## 2021-07-29 DIAGNOSIS — N40.0 ENLARGED PROSTATE WITHOUT LOWER URINARY TRACT SYMPTOMS (LUTS): ICD-10-CM

## 2021-07-29 DIAGNOSIS — K21.9 GASTRO-ESOPHAGEAL REFLUX DISEASE WITHOUT ESOPHAGITIS: ICD-10-CM

## 2021-07-29 DIAGNOSIS — I10 ESSENTIAL (PRIMARY) HYPERTENSION: ICD-10-CM

## 2021-07-29 DIAGNOSIS — Z95.1 HISTORY OF CORONARY ARTERY BYPASS GRAFT X 2: ICD-10-CM

## 2021-07-29 DIAGNOSIS — E78.2 MIXED HYPERLIPIDEMIA: Primary | ICD-10-CM

## 2021-07-29 DIAGNOSIS — M75.42 IMPINGEMENT SYNDROME OF LEFT SHOULDER: ICD-10-CM

## 2021-07-29 PROCEDURE — 99214 OFFICE O/P EST MOD 30 MIN: CPT | Mod: S$GLB,,, | Performed by: FAMILY MEDICINE

## 2021-07-29 PROCEDURE — 99214 PR OFFICE/OUTPT VISIT, EST, LEVL IV, 30-39 MIN: ICD-10-PCS | Mod: S$GLB,,, | Performed by: FAMILY MEDICINE

## 2021-07-29 RX ORDER — ATENOLOL 25 MG/1
25 TABLET ORAL DAILY
Qty: 90 TABLET | Refills: 3 | Status: SHIPPED | OUTPATIENT
Start: 2021-07-29 | End: 2022-06-20 | Stop reason: SDUPTHER

## 2021-07-29 RX ORDER — TAMSULOSIN HYDROCHLORIDE 0.4 MG/1
0.4 CAPSULE ORAL DAILY
Qty: 90 CAPSULE | Refills: 3 | Status: SHIPPED | OUTPATIENT
Start: 2021-07-29 | End: 2022-08-16

## 2021-09-20 ENCOUNTER — TELEPHONE (OUTPATIENT)
Dept: FAMILY MEDICINE | Facility: CLINIC | Age: 76
End: 2021-09-20

## 2021-09-21 ENCOUNTER — TELEPHONE (OUTPATIENT)
Dept: CARDIOLOGY | Facility: CLINIC | Age: 76
End: 2021-09-21

## 2021-09-27 ENCOUNTER — CLINICAL SUPPORT (OUTPATIENT)
Dept: FAMILY MEDICINE | Facility: CLINIC | Age: 76
End: 2021-09-27
Payer: MEDICARE

## 2021-09-27 VITALS — TEMPERATURE: 98 F

## 2021-09-27 DIAGNOSIS — Z23 NEEDS FLU SHOT: Primary | ICD-10-CM

## 2021-09-27 PROCEDURE — 90662 IIV NO PRSV INCREASED AG IM: CPT | Mod: S$GLB,,, | Performed by: NURSE PRACTITIONER

## 2021-09-27 PROCEDURE — 90662 FLU VACCINE - QUADRIVALENT - HIGH DOSE (65+) PRESERVATIVE FREE IM: ICD-10-PCS | Mod: S$GLB,,, | Performed by: NURSE PRACTITIONER

## 2021-09-27 PROCEDURE — G0008 ADMIN INFLUENZA VIRUS VAC: HCPCS | Mod: S$GLB,,, | Performed by: NURSE PRACTITIONER

## 2021-09-27 PROCEDURE — G0008 FLU VACCINE - QUADRIVALENT - HIGH DOSE (65+) PRESERVATIVE FREE IM: ICD-10-PCS | Mod: S$GLB,,, | Performed by: NURSE PRACTITIONER

## 2021-09-30 ENCOUNTER — IMMUNIZATION (OUTPATIENT)
Dept: PRIMARY CARE CLINIC | Facility: CLINIC | Age: 76
End: 2021-09-30
Payer: MEDICARE

## 2021-09-30 DIAGNOSIS — Z23 NEED FOR VACCINATION: Primary | ICD-10-CM

## 2021-09-30 PROCEDURE — 0003A COVID-19, MRNA, LNP-S, PF, 30 MCG/0.3 ML DOSE VACCINE: CPT | Mod: S$GLB,,, | Performed by: FAMILY MEDICINE

## 2021-09-30 PROCEDURE — 0003A COVID-19, MRNA, LNP-S, PF, 30 MCG/0.3 ML DOSE VACCINE: ICD-10-PCS | Mod: S$GLB,,, | Performed by: FAMILY MEDICINE

## 2021-09-30 PROCEDURE — 91300 COVID-19, MRNA, LNP-S, PF, 30 MCG/0.3 ML DOSE VACCINE: CPT | Mod: S$GLB,,, | Performed by: FAMILY MEDICINE

## 2021-09-30 PROCEDURE — 91300 COVID-19, MRNA, LNP-S, PF, 30 MCG/0.3 ML DOSE VACCINE: ICD-10-PCS | Mod: S$GLB,,, | Performed by: FAMILY MEDICINE

## 2021-10-14 ENCOUNTER — PATIENT MESSAGE (OUTPATIENT)
Dept: CARDIOLOGY | Facility: CLINIC | Age: 76
End: 2021-10-14

## 2021-10-14 RX ORDER — ROSUVASTATIN CALCIUM 20 MG/1
20 TABLET, COATED ORAL NIGHTLY
Qty: 90 TABLET | Refills: 3 | Status: SHIPPED | OUTPATIENT
Start: 2021-10-14 | End: 2021-10-15 | Stop reason: SDUPTHER

## 2021-10-18 RX ORDER — ROSUVASTATIN CALCIUM 20 MG/1
20 TABLET, COATED ORAL NIGHTLY
Qty: 90 TABLET | Refills: 3 | Status: SHIPPED | OUTPATIENT
Start: 2021-10-18 | End: 2022-10-12

## 2021-11-17 ENCOUNTER — OFFICE VISIT (OUTPATIENT)
Dept: CARDIOLOGY | Facility: CLINIC | Age: 76
End: 2021-11-17
Payer: MEDICARE

## 2021-11-17 DIAGNOSIS — Z95.1 HISTORY OF CORONARY ARTERY BYPASS GRAFT X 2: ICD-10-CM

## 2021-11-17 DIAGNOSIS — I34.0 NONRHEUMATIC MITRAL VALVE REGURGITATION: ICD-10-CM

## 2021-11-17 DIAGNOSIS — I10 ESSENTIAL (PRIMARY) HYPERTENSION: ICD-10-CM

## 2021-11-17 DIAGNOSIS — E78.2 MIXED HYPERLIPIDEMIA: ICD-10-CM

## 2021-11-17 PROCEDURE — 99213 OFFICE O/P EST LOW 20 MIN: CPT | Mod: S$GLB,,, | Performed by: INTERNAL MEDICINE

## 2021-11-17 PROCEDURE — 99213 PR OFFICE/OUTPT VISIT, EST, LEVL III, 20-29 MIN: ICD-10-PCS | Mod: S$GLB,,, | Performed by: INTERNAL MEDICINE

## 2021-12-30 ENCOUNTER — TELEPHONE (OUTPATIENT)
Dept: FAMILY MEDICINE | Facility: CLINIC | Age: 76
End: 2021-12-30
Payer: MEDICARE

## 2022-01-20 ENCOUNTER — TELEPHONE (OUTPATIENT)
Dept: FAMILY MEDICINE | Facility: CLINIC | Age: 77
End: 2022-01-20
Payer: MEDICARE

## 2022-01-20 DIAGNOSIS — I25.118 ATHEROSCLEROTIC HEART DISEASE OF NATIVE CORONARY ARTERY WITH OTHER FORMS OF ANGINA PECTORIS: ICD-10-CM

## 2022-01-20 DIAGNOSIS — I10 ESSENTIAL (PRIMARY) HYPERTENSION: Primary | ICD-10-CM

## 2022-01-20 DIAGNOSIS — E78.2 MIXED HYPERLIPIDEMIA: ICD-10-CM

## 2022-01-20 DIAGNOSIS — Z79.899 ENCOUNTER FOR LONG-TERM (CURRENT) USE OF OTHER MEDICATIONS: ICD-10-CM

## 2022-01-20 DIAGNOSIS — Z95.1 HISTORY OF CORONARY ARTERY BYPASS GRAFT X 2: ICD-10-CM

## 2022-01-25 ENCOUNTER — TELEPHONE (OUTPATIENT)
Dept: FAMILY MEDICINE | Facility: CLINIC | Age: 77
End: 2022-01-25
Payer: MEDICARE

## 2022-01-25 LAB
ALBUMIN SERPL-MCNC: 4.3 G/DL (ref 3.6–5.1)
ALBUMIN/CREAT UR: 4 MCG/MG CREAT
ALBUMIN/GLOB SERPL: 1.9 (CALC) (ref 1–2.5)
ALP SERPL-CCNC: 55 U/L (ref 35–144)
ALT SERPL-CCNC: 20 U/L (ref 9–46)
APPEARANCE UR: CLEAR
AST SERPL-CCNC: 22 U/L (ref 10–35)
BACTERIA #/AREA URNS HPF: NORMAL /HPF
BACTERIA UR CULT: NORMAL
BASOPHILS # BLD AUTO: 0 CELLS/UL (ref 0–200)
BASOPHILS NFR BLD AUTO: 0 %
BILIRUB SERPL-MCNC: 1.8 MG/DL (ref 0.2–1.2)
BILIRUB UR QL STRIP: NEGATIVE
BUN SERPL-MCNC: 15 MG/DL (ref 7–25)
BUN/CREAT SERPL: ABNORMAL (CALC) (ref 6–22)
CALCIUM SERPL-MCNC: 9.2 MG/DL (ref 8.6–10.3)
CHLORIDE SERPL-SCNC: 106 MMOL/L (ref 98–110)
CHOLEST SERPL-MCNC: 126 MG/DL
CHOLEST/HDLC SERPL: 2.4 (CALC)
CO2 SERPL-SCNC: 30 MMOL/L (ref 20–32)
COLOR UR: NORMAL
CREAT SERPL-MCNC: 0.9 MG/DL (ref 0.7–1.18)
CREAT UR-MCNC: 242 MG/DL (ref 20–320)
EOSINOPHIL # BLD AUTO: 70 CELLS/UL (ref 15–500)
EOSINOPHIL NFR BLD AUTO: 2.4 %
ERYTHROCYTE [DISTWIDTH] IN BLOOD BY AUTOMATED COUNT: 13 % (ref 11–15)
GLOBULIN SER CALC-MCNC: 2.3 G/DL (CALC) (ref 1.9–3.7)
GLUCOSE SERPL-MCNC: 100 MG/DL (ref 65–99)
GLUCOSE UR QL STRIP: NEGATIVE
HCT VFR BLD AUTO: 42.6 % (ref 38.5–50)
HDLC SERPL-MCNC: 52 MG/DL
HGB BLD-MCNC: 14.4 G/DL (ref 13.2–17.1)
HGB UR QL STRIP: NEGATIVE
HYALINE CASTS #/AREA URNS LPF: NORMAL /LPF
KETONES UR QL STRIP: NEGATIVE
LDLC SERPL CALC-MCNC: 52 MG/DL (CALC)
LEUKOCYTE ESTERASE UR QL STRIP: NEGATIVE
LYMPHOCYTES # BLD AUTO: 1241 CELLS/UL (ref 850–3900)
LYMPHOCYTES NFR BLD AUTO: 42.8 %
MCH RBC QN AUTO: 30.3 PG (ref 27–33)
MCHC RBC AUTO-ENTMCNC: 33.8 G/DL (ref 32–36)
MCV RBC AUTO: 89.5 FL (ref 80–100)
MICROALBUMIN UR-MCNC: 1 MG/DL
MONOCYTES # BLD AUTO: 310 CELLS/UL (ref 200–950)
MONOCYTES NFR BLD AUTO: 10.7 %
NEUTROPHILS # BLD AUTO: 1279 CELLS/UL (ref 1500–7800)
NEUTROPHILS NFR BLD AUTO: 44.1 %
NITRITE UR QL STRIP: NEGATIVE
NONHDLC SERPL-MCNC: 74 MG/DL (CALC)
PH UR STRIP: NORMAL [PH] (ref 5–8)
PLATELET # BLD AUTO: 150 THOUSAND/UL (ref 140–400)
PMV BLD REES-ECKER: 10.8 FL (ref 7.5–12.5)
POTASSIUM SERPL-SCNC: 4.6 MMOL/L (ref 3.5–5.3)
PROT SERPL-MCNC: 6.6 G/DL (ref 6.1–8.1)
PROT UR QL STRIP: NEGATIVE
RBC # BLD AUTO: 4.76 MILLION/UL (ref 4.2–5.8)
RBC #/AREA URNS HPF: NORMAL /HPF
SODIUM SERPL-SCNC: 141 MMOL/L (ref 135–146)
SP GR UR STRIP: 1.02 (ref 1–1.03)
SQUAMOUS #/AREA URNS HPF: NORMAL /HPF
TRIGL SERPL-MCNC: 135 MG/DL
TSH SERPL-ACNC: 1.73 MIU/L (ref 0.4–4.5)
WBC # BLD AUTO: 2.9 THOUSAND/UL (ref 3.8–10.8)
WBC #/AREA URNS HPF: NORMAL /HPF

## 2022-02-02 ENCOUNTER — OFFICE VISIT (OUTPATIENT)
Dept: FAMILY MEDICINE | Facility: CLINIC | Age: 77
End: 2022-02-02
Payer: MEDICARE

## 2022-02-02 ENCOUNTER — HOSPITAL ENCOUNTER (OUTPATIENT)
Dept: RADIOLOGY | Facility: HOSPITAL | Age: 77
Discharge: HOME | End: 2022-02-02
Attending: FAMILY MEDICINE
Payer: MEDICARE

## 2022-02-02 VITALS
DIASTOLIC BLOOD PRESSURE: 84 MMHG | WEIGHT: 221 LBS | BODY MASS INDEX: 29.29 KG/M2 | HEART RATE: 62 BPM | SYSTOLIC BLOOD PRESSURE: 130 MMHG | HEIGHT: 73 IN

## 2022-02-02 DIAGNOSIS — Z95.1 HISTORY OF CORONARY ARTERY BYPASS GRAFT X 2: ICD-10-CM

## 2022-02-02 DIAGNOSIS — N40.0 ENLARGED PROSTATE WITHOUT LOWER URINARY TRACT SYMPTOMS (LUTS): ICD-10-CM

## 2022-02-02 DIAGNOSIS — I25.118 ATHEROSCLEROTIC HEART DISEASE OF NATIVE CORONARY ARTERY WITH OTHER FORMS OF ANGINA PECTORIS: ICD-10-CM

## 2022-02-02 DIAGNOSIS — E78.2 MIXED HYPERLIPIDEMIA: ICD-10-CM

## 2022-02-02 DIAGNOSIS — K21.9 GASTRO-ESOPHAGEAL REFLUX DISEASE WITHOUT ESOPHAGITIS: ICD-10-CM

## 2022-02-02 DIAGNOSIS — M23.91 INTERNAL DERANGEMENT OF RIGHT KNEE: ICD-10-CM

## 2022-02-02 DIAGNOSIS — I10 ESSENTIAL (PRIMARY) HYPERTENSION: ICD-10-CM

## 2022-02-02 DIAGNOSIS — M23.91 INTERNAL DERANGEMENT OF RIGHT KNEE: Primary | ICD-10-CM

## 2022-02-02 PROCEDURE — 1159F MED LIST DOCD IN RCRD: CPT | Mod: S$GLB,,, | Performed by: FAMILY MEDICINE

## 2022-02-02 PROCEDURE — 3079F DIAST BP 80-89 MM HG: CPT | Mod: S$GLB,,, | Performed by: FAMILY MEDICINE

## 2022-02-02 PROCEDURE — 99214 OFFICE O/P EST MOD 30 MIN: CPT | Mod: S$GLB,,, | Performed by: FAMILY MEDICINE

## 2022-02-02 PROCEDURE — 3288F PR FALLS RISK ASSESSMENT DOCUMENTED: ICD-10-PCS | Mod: S$GLB,,, | Performed by: FAMILY MEDICINE

## 2022-02-02 PROCEDURE — 73562 X-RAY EXAM OF KNEE 3: CPT | Mod: TC,PO,RT

## 2022-02-02 PROCEDURE — 1159F PR MEDICATION LIST DOCUMENTED IN MEDICAL RECORD: ICD-10-PCS | Mod: S$GLB,,, | Performed by: FAMILY MEDICINE

## 2022-02-02 PROCEDURE — 1101F PT FALLS ASSESS-DOCD LE1/YR: CPT | Mod: S$GLB,,, | Performed by: FAMILY MEDICINE

## 2022-02-02 PROCEDURE — 3075F SYST BP GE 130 - 139MM HG: CPT | Mod: S$GLB,,, | Performed by: FAMILY MEDICINE

## 2022-02-02 PROCEDURE — 1101F PR PT FALLS ASSESS DOC 0-1 FALLS W/OUT INJ PAST YR: ICD-10-PCS | Mod: S$GLB,,, | Performed by: FAMILY MEDICINE

## 2022-02-02 PROCEDURE — 3075F PR MOST RECENT SYSTOLIC BLOOD PRESS GE 130-139MM HG: ICD-10-PCS | Mod: S$GLB,,, | Performed by: FAMILY MEDICINE

## 2022-02-02 PROCEDURE — 3288F FALL RISK ASSESSMENT DOCD: CPT | Mod: S$GLB,,, | Performed by: FAMILY MEDICINE

## 2022-02-02 PROCEDURE — 99214 PR OFFICE/OUTPT VISIT, EST, LEVL IV, 30-39 MIN: ICD-10-PCS | Mod: S$GLB,,, | Performed by: FAMILY MEDICINE

## 2022-02-02 PROCEDURE — 3079F PR MOST RECENT DIASTOLIC BLOOD PRESSURE 80-89 MM HG: ICD-10-PCS | Mod: S$GLB,,, | Performed by: FAMILY MEDICINE

## 2022-02-02 RX ORDER — ESTRADIOL 0.1 MG/G
CREAM VAGINAL DAILY
COMMUNITY
End: 2022-02-02

## 2022-02-02 NOTE — PROGRESS NOTES
SUBJECTIVE:    Patient ID: Jose Marti is a 77 y.o. male.    Chief Complaint: Knee Pain (Since 6 weeks //Right knee //brought med list //Lab-results //abc)    77-year-old male had some right knee discomfort 6 weeks ago.  While mowing his lawn, his right knee popped.  December 2021. Back in the of the knee became swollen and uncomfortable he had problems extending the knee fully at night.  He will comes uncomfortable in that knee after walking 1 block.  He has tried heating pads and ibuprofen for relief.    Positive for COVID in December of 2021, symptoms include cough rhinitis that resolved in 4-5 days.    He is current on his COVID booster and has had the 1st Shingrix vaccine dose.    Cardiology Dr. ISHA Dominguez -2017   2 vessel CABG no recent angina.      2020-colonoscopy with Dr. Herrera-no RTC necessary.      Telephone on 01/20/2022   Component Date Value Ref Range Status    WBC 01/24/2022 2.9* 3.8 - 10.8 Thousand/uL Final    RBC 01/24/2022 4.76  4.20 - 5.80 Million/uL Final    Hemoglobin 01/24/2022 14.4  13.2 - 17.1 g/dL Final    Hematocrit 01/24/2022 42.6  38.5 - 50.0 % Final    MCV 01/24/2022 89.5  80.0 - 100.0 fL Final    MCH 01/24/2022 30.3  27.0 - 33.0 pg Final    MCHC 01/24/2022 33.8  32.0 - 36.0 g/dL Final    RDW 01/24/2022 13.0  11.0 - 15.0 % Final    Platelets 01/24/2022 150  140 - 400 Thousand/uL Final    MPV 01/24/2022 10.8  7.5 - 12.5 fL Final    Neutrophils, Abs 01/24/2022 1,279* 1,500 - 7,800 cells/uL Final    Lymph # 01/24/2022 1,241  850 - 3,900 cells/uL Final    Mono # 01/24/2022 310  200 - 950 cells/uL Final    Eos # 01/24/2022 70  15 - 500 cells/uL Final    Baso # 01/24/2022 0  0 - 200 cells/uL Final    Neutrophils Relative 01/24/2022 44.1  % Final    Lymph % 01/24/2022 42.8  % Final    Mono % 01/24/2022 10.7  % Final    Eosinophil % 01/24/2022 2.4  % Final    Basophil % 01/24/2022 0.0  % Final    Glucose 01/24/2022 100* 65 - 99 mg/dL Final    BUN 01/24/2022 15  7 -  25 mg/dL Final    Creatinine 01/24/2022 0.90  0.70 - 1.18 mg/dL Final    eGFR if non  01/24/2022 83  > OR = 60 mL/min/1.73m2 Final    eGFR if  01/24/2022 96  > OR = 60 mL/min/1.73m2 Final    BUN/Creatinine Ratio 01/24/2022 NOT APPLICABLE  6 - 22 (calc) Final    Sodium 01/24/2022 141  135 - 146 mmol/L Final    Potassium 01/24/2022 4.6  3.5 - 5.3 mmol/L Final    Chloride 01/24/2022 106  98 - 110 mmol/L Final    CO2 01/24/2022 30  20 - 32 mmol/L Final    Calcium 01/24/2022 9.2  8.6 - 10.3 mg/dL Final    Total Protein 01/24/2022 6.6  6.1 - 8.1 g/dL Final    Albumin 01/24/2022 4.3  3.6 - 5.1 g/dL Final    Globulin, Total 01/24/2022 2.3  1.9 - 3.7 g/dL (calc) Final    Albumin/Globulin Ratio 01/24/2022 1.9  1.0 - 2.5 (calc) Final    Total Bilirubin 01/24/2022 1.8* 0.2 - 1.2 mg/dL Final    Alkaline Phosphatase 01/24/2022 55  35 - 144 U/L Final    AST 01/24/2022 22  10 - 35 U/L Final    ALT 01/24/2022 20  9 - 46 U/L Final    Cholesterol 01/24/2022 126  <200 mg/dL Final    HDL 01/24/2022 52  > OR = 40 mg/dL Final    Triglycerides 01/24/2022 135  <150 mg/dL Final    LDL Cholesterol 01/24/2022 52  mg/dL (calc) Final    HDL/Cholesterol Ratio 01/24/2022 2.4  <5.0 (calc) Final    Non HDL Chol. (LDL+VLDL) 01/24/2022 74  <130 mg/dL (calc) Final    Creatinine, Urine 01/24/2022 242  20 - 320 mg/dL Final    Microalb, Ur 01/24/2022 1.0  See Note: mg/dL Final    Microalb/Creat Ratio 01/24/2022 4  <30 mcg/mg creat Final    TSH 01/24/2022 1.73  0.40 - 4.50 mIU/L Final    Color, UA 01/24/2022 DARK YELLOW  YELLOW Final    Appearance, UA 01/24/2022 CLEAR  CLEAR Final    Specific [place], UA 01/24/2022 1.024  1.001 - 1.035 Final    pH, UA 01/24/2022 < OR = 5.0  5.0 - 8.0 Final    Glucose, UA 01/24/2022 NEGATIVE  NEGATIVE Final    Bilirubin, UA 01/24/2022 NEGATIVE  NEGATIVE Final    Ketones, UA 01/24/2022 NEGATIVE  NEGATIVE Final    Occult Blood UA 01/24/2022 NEGATIVE  NEGATIVE  Final    Protein, UA 01/24/2022 NEGATIVE  NEGATIVE Final    Nitrite, UA 01/24/2022 NEGATIVE  NEGATIVE Final    Leukocytes, UA 01/24/2022 NEGATIVE  NEGATIVE Final    WBC Casts, UA 01/24/2022 NONE SEEN  < OR = 5 /HPF Final    RBC Casts, UA 01/24/2022 0-2  < OR = 2 /HPF Final    Squam Epithel, UA 01/24/2022 NONE SEEN  < OR = 5 /HPF Final    Bacteria, UA 01/24/2022 NONE SEEN  NONE SEEN /HPF Final    Hyaline Casts, UA 01/24/2022 NONE SEEN  NONE SEEN /LPF Final    Reflexive Urine Culture 01/24/2022    Final       No past medical history on file.  Social History     Socioeconomic History    Marital status:    Tobacco Use    Smoking status: Never Smoker    Smokeless tobacco: Never Used   Substance and Sexual Activity    Alcohol use: Yes    Drug use: Never    Sexual activity: Yes     Partners: Female     Past Surgical History:   Procedure Laterality Date    BACK SURGERY  10/2018    Dr Menon-Lumbar  fusion L3-L5    CARDIAC SURGERY  2017    Dr MARK [facility]cial-[facility]- 2 vessel CABG    COLONOSCOPY  11/18/2013    Dr Ramos DUE 11/18/2018    GALLBLADDER SURGERY      TONSILLECTOMY       No family history on file.    Review of patient's allergies indicates:   Allergen Reactions    Metoprolol succinate Hives     Broke out in hives        Current Outpatient Medications:     aspirin 325 MG tablet, Take 325 mg by mouth once daily., Disp: , Rfl:     atenoloL (TENORMIN) 25 MG tablet, Take 1 tablet (25 mg total) by mouth once daily., Disp: 90 tablet, Rfl: 3    calcium carbonate-vitamin D3 600mg (1,000mg) -1,000 unit Tab, Take by mouth once., Disp: , Rfl:     cinnamon bark 500 mg capsule, Take 1,000 mg by mouth once daily., Disp: , Rfl:     losartan (COZAAR) 25 MG tablet, Take 1 tablet (25 mg total) by mouth once daily., Disp: 90 tablet, Rfl: 3    metroNIDAZOLE (METROGEL) 0.75 % gel, Apply topically 2 (two) times daily., Disp: , Rfl:     omeprazole (PRILOSEC) 20 MG capsule, Take 1 capsule (20 mg total) by mouth  once daily., Disp: 90 capsule, Rfl: 1    rosuvastatin (CRESTOR) 20 MG tablet, Take 1 tablet (20 mg total) by mouth nightly., Disp: 90 tablet, Rfl: 3    tamsulosin (FLOMAX) 0.4 mg Cap, Take 1 capsule (0.4 mg total) by mouth once daily., Disp: 90 capsule, Rfl: 3    triamcinolone acetonide 0.025% (KENALOG) 0.025 % cream, Apply topically 2 (two) times daily., Disp: 30 g, Rfl: 2    cyclobenzaprine (FLEXERIL) 10 MG tablet, prn, Disp: , Rfl:     gabapentin (NEURONTIN) 100 MG capsule, Take 100 mg by mouth every evening. , Disp: , Rfl:     multivitamin (THERAGRAN) per tablet, Take 1 tablet by mouth once daily., Disp: , Rfl:     omega 3-dha-epa-fish oil 1,000 mg (120 mg-180 mg) Cap, , Disp: , Rfl:     Review of Systems   Constitutional: Negative for appetite change, chills, fatigue, fever and unexpected weight change.   HENT: Negative for congestion, ear pain and trouble swallowing.    Eyes: Negative for pain, discharge and visual disturbance.   Respiratory: Negative for apnea, cough, shortness of breath and wheezing.    Cardiovascular: Negative for chest pain and leg swelling.   Gastrointestinal: Negative for abdominal pain, blood in stool, constipation, diarrhea, nausea and vomiting.        Omeprazole used  3-4 x  A  week   Endocrine: Negative for cold intolerance, heat intolerance and polydipsia.   Genitourinary: Negative for dysuria, hematuria, testicular pain and urgency.        Nocturia  2-3 x  Night, urge incont  At  times, on Flomax 0.4 mg daily   Musculoskeletal: Positive for arthralgias (Right knee medial pain and posterior popliteal swelling). Negative for gait problem, joint swelling and myalgias.   Neurological: Negative for dizziness, seizures and numbness.   Psychiatric/Behavioral: Negative for agitation, behavioral problems, hallucinations and sleep disturbance. The patient is not nervous/anxious.           Objective:      Vitals:    02/02/22 1425   BP: 130/84   Pulse: 62   Weight: 100.2 kg (221 lb)  "  Height: 6' 1" (1.854 m)     Physical Exam  Vitals and nursing note reviewed.   Constitutional:       General: He is not in acute distress.     Appearance: Normal appearance. He is well-developed and well-nourished. He is not toxic-appearing.   HENT:      Head: Normocephalic and atraumatic.      Right Ear: Tympanic membrane and external ear normal.      Left Ear: Tympanic membrane and external ear normal.      Nose: Nose normal.      Mouth/Throat:      Mouth: Oropharynx is clear and moist.      Pharynx: Oropharynx is clear.   Eyes:      Extraocular Movements: EOM normal.      Pupils: Pupils are equal, round, and reactive to light.   Neck:      Thyroid: No thyromegaly.      Vascular: No carotid bruit.   Cardiovascular:      Rate and Rhythm: Normal rate and regular rhythm.      Pulses: Intact distal pulses.      Heart sounds: Normal heart sounds. No murmur heard.      Pulmonary:      Effort: Pulmonary effort is normal.      Breath sounds: Normal breath sounds. No wheezing or rales.   Abdominal:      General: Bowel sounds are normal. There is no distension.      Palpations: Abdomen is soft. There is no hepatosplenomegaly.      Tenderness: There is no abdominal tenderness.   Musculoskeletal:         General: No tenderness or deformity. Normal range of motion.      Cervical back: Normal range of motion and neck supple.      Lumbar back: Normal. No pain or spasms.      Comments: Bends 90 degrees at  waist, shoulders good range of motion, right knee medial tenderness near the joint line noted.  Mild Baker's cyst and swelling noted posteriorly.  No pitting edema to lower extremities   Lymphadenopathy:      Cervical: No cervical adenopathy.   Skin:     General: Skin is warm and dry.      Findings: No rash.   Neurological:      Mental Status: He is alert and oriented to person, place, and time. Mental status is at baseline.      Cranial Nerves: No cranial nerve deficit.      Coordination: Coordination normal.      Gait: Gait " abnormal.   Psychiatric:         Mood and Affect: Mood and affect and mood normal.         Behavior: Behavior normal.         Thought Content: Thought content normal.         Judgment: Judgment normal.           Assessment:       1. Internal derangement of right knee    2. Atherosclerotic heart disease of native coronary artery with other forms of angina pectoris    3. Essential (primary) hypertension    4. Mixed hyperlipidemia    5. History of coronary artery bypass graft x 2    6. Enlarged prostate without lower urinary tract symptoms (luts)    7. Gastro-esophageal reflux disease without esophagitis         Plan:       Internal derangement of right knee  -     X-Ray Knee 3 View Right; Future; Expected date: 02/02/2022  Suspect either medial collateral ligament injury or a meniscus injury.  Will start with x-ray of the right knee, continue ibuprofen  Atherosclerotic heart disease of native coronary artery with other forms of angina pectoris  Stable at this time no angina  Essential (primary) hypertension  Blood pressure well controlled  Mixed hyperlipidemia  Cholesterol 126 HDL 52  LDL 52 excellent lipid panel  History of coronary artery bypass graft x 2    Enlarged prostate without lower urinary tract symptoms (luts)  Continue Flomax  Gastro-esophageal reflux disease without esophagitis  Omeprazole 20 mg as needed    Follow up in about 6 months (around 8/2/2022).        2/2/2022 Abdon Schuler

## 2022-02-03 ENCOUNTER — TELEPHONE (OUTPATIENT)
Dept: FAMILY MEDICINE | Facility: CLINIC | Age: 77
End: 2022-02-03
Payer: MEDICARE

## 2022-02-03 DIAGNOSIS — M23.91 INTERNAL DERANGEMENT OF RIGHT KNEE: Primary | ICD-10-CM

## 2022-02-03 NOTE — PROGRESS NOTES
Call patient.  X-rays of the right knee showed tiny bone spurs behind his kneecap.  Mild osteoarthritis was diagnosed.  An MRI would give us more information.  Would you  like that set up for you?

## 2022-02-03 NOTE — TELEPHONE ENCOUNTER
----- Message from Abdon Schuler MD sent at 2/3/2022  7:46 AM CST -----  Call patient.  X-rays of the right knee showed tiny bone spurs behind his kneecap.  Mild osteoarthritis was diagnosed.  An MRI would give us more information.  Would you  like that set up for you?

## 2022-02-11 ENCOUNTER — HOSPITAL ENCOUNTER (OUTPATIENT)
Dept: RADIOLOGY | Facility: HOSPITAL | Age: 77
Discharge: HOME | End: 2022-02-11
Attending: FAMILY MEDICINE
Payer: MEDICARE

## 2022-02-11 DIAGNOSIS — M23.91 INTERNAL DERANGEMENT OF RIGHT KNEE: ICD-10-CM

## 2022-02-11 PROCEDURE — 73721 MRI JNT OF LWR EXTRE W/O DYE: CPT | Mod: TC,PO,RT

## 2022-02-13 NOTE — PROGRESS NOTES
Call patient.  MRI of the right knee does show a complex tear of the posterior horn of the medial meniscus.  Also has significant arthritic bone spurs showing up in the knee.  Would recommend referring him to the orthopedic of his choice.  May need a knee scope to clean out this knee.

## 2022-02-14 ENCOUNTER — TELEPHONE (OUTPATIENT)
Dept: FAMILY MEDICINE | Facility: CLINIC | Age: 77
End: 2022-02-14
Payer: MEDICARE

## 2022-02-14 NOTE — TELEPHONE ENCOUNTER
----- Message from Abdon Schuler MD sent at 2/13/2022  1:50 PM CST -----  Call patient.  MRI of the right knee does show a complex tear of the posterior horn of the medial meniscus.  Also has significant arthritic bone spurs showing up in the knee.  Would recommend referring him to the orthopedic of his choice.  May need a knee scope to clean out this knee.

## 2022-02-14 NOTE — TELEPHONE ENCOUNTER
Spoke to patient with results verbatim per Dr Schuler. Verbalized understanding on all. States he does not have an orthopedic. I informed patient that we usually refer to Dr Johnson. Patient said he will look into ortho's and let us know who he decides to see.

## 2022-05-04 ENCOUNTER — TELEPHONE (OUTPATIENT)
Dept: CARDIOLOGY | Facility: CLINIC | Age: 77
End: 2022-05-04
Payer: MEDICARE

## 2022-05-05 ENCOUNTER — PATIENT MESSAGE (OUTPATIENT)
Dept: CARDIOLOGY | Facility: CLINIC | Age: 77
End: 2022-05-05
Payer: MEDICARE

## 2022-05-06 ENCOUNTER — TELEPHONE (OUTPATIENT)
Dept: CARDIOLOGY | Facility: CLINIC | Age: 77
End: 2022-05-06
Payer: MEDICARE

## 2022-05-06 NOTE — TELEPHONE ENCOUNTER
----- Message from Carlos Carcamo sent at 5/6/2022 11:12 AM CDT -----  Contact: pt  Type: Needs Medical Advice    Who Called:pt  Best Call Back Number:[phone]    Additional Information: Requesting callback regarding asking for an appt asap please call back pt to make an appt     Please Advise-Thank you

## 2022-06-02 ENCOUNTER — IMMUNIZATION (OUTPATIENT)
Dept: PRIMARY CARE CLINIC | Facility: CLINIC | Age: 77
End: 2022-06-02
Payer: MEDICARE

## 2022-06-02 ENCOUNTER — PATIENT MESSAGE (OUTPATIENT)
Dept: ADMINISTRATIVE | Facility: OTHER | Age: 77
End: 2022-06-02
Payer: MEDICARE

## 2022-06-02 DIAGNOSIS — Z23 NEED FOR VACCINATION: Primary | ICD-10-CM

## 2022-06-02 PROCEDURE — 0054A PR IMMUNIZ ADMIN, SARS-COV-2 COVID-19 VACC, TRI SUCROSE, 30MCG/0.3ML, BOOSTER DOSE: CPT | Mod: S$GLB,,, | Performed by: FAMILY MEDICINE

## 2022-06-02 PROCEDURE — 91305 COVID-19, MRNA, LNP-S, PF, 30 MCG/0.3 ML DOSE VACCINE (PFIZER): CPT | Mod: S$GLB,,, | Performed by: FAMILY MEDICINE

## 2022-06-02 PROCEDURE — 91305 COVID-19, MRNA, LNP-S, PF, 30 MCG/0.3 ML DOSE VACCINE (PFIZER): ICD-10-PCS | Mod: S$GLB,,, | Performed by: FAMILY MEDICINE

## 2022-06-02 PROCEDURE — 0054A PR IMMUNIZ ADMIN, SARS-COV-2 COVID-19 VACC, TRI SUCROSE, 30MCG/0.3ML, BOOSTER DOSE: ICD-10-PCS | Mod: S$GLB,,, | Performed by: FAMILY MEDICINE

## 2022-06-14 ENCOUNTER — OFFICE VISIT (OUTPATIENT)
Dept: CARDIOLOGY | Facility: CLINIC | Age: 77
End: 2022-06-14
Payer: MEDICARE

## 2022-06-14 VITALS
WEIGHT: 221 LBS | OXYGEN SATURATION: 99 % | BODY MASS INDEX: 29.29 KG/M2 | DIASTOLIC BLOOD PRESSURE: 82 MMHG | SYSTOLIC BLOOD PRESSURE: 126 MMHG | HEIGHT: 73 IN | HEART RATE: 62 BPM

## 2022-06-14 DIAGNOSIS — I25.118 ATHEROSCLEROTIC HEART DISEASE OF NATIVE CORONARY ARTERY WITH OTHER FORMS OF ANGINA PECTORIS: ICD-10-CM

## 2022-06-14 DIAGNOSIS — E78.2 MIXED HYPERLIPIDEMIA: Primary | ICD-10-CM

## 2022-06-14 DIAGNOSIS — I36.1 NONRHEUMATIC TRICUSPID VALVE REGURGITATION: ICD-10-CM

## 2022-06-14 DIAGNOSIS — K21.9 GASTRO-ESOPHAGEAL REFLUX DISEASE WITHOUT ESOPHAGITIS: ICD-10-CM

## 2022-06-14 DIAGNOSIS — R25.2 LEG CRAMPS: ICD-10-CM

## 2022-06-14 DIAGNOSIS — I10 ESSENTIAL (PRIMARY) HYPERTENSION: ICD-10-CM

## 2022-06-14 PROCEDURE — 93000 ELECTROCARDIOGRAM COMPLETE: CPT | Mod: S$GLB,,, | Performed by: INTERNAL MEDICINE

## 2022-06-14 PROCEDURE — 3288F PR FALLS RISK ASSESSMENT DOCUMENTED: ICD-10-PCS | Mod: CPTII,S$GLB,, | Performed by: INTERNAL MEDICINE

## 2022-06-14 PROCEDURE — 3074F PR MOST RECENT SYSTOLIC BLOOD PRESSURE < 130 MM HG: ICD-10-PCS | Mod: CPTII,S$GLB,, | Performed by: INTERNAL MEDICINE

## 2022-06-14 PROCEDURE — 3079F DIAST BP 80-89 MM HG: CPT | Mod: CPTII,S$GLB,, | Performed by: INTERNAL MEDICINE

## 2022-06-14 PROCEDURE — 1160F PR REVIEW ALL MEDS BY PRESCRIBER/CLIN PHARMACIST DOCUMENTED: ICD-10-PCS | Mod: CPTII,S$GLB,, | Performed by: INTERNAL MEDICINE

## 2022-06-14 PROCEDURE — 99214 PR OFFICE/OUTPT VISIT, EST, LEVL IV, 30-39 MIN: ICD-10-PCS | Mod: S$GLB,,, | Performed by: INTERNAL MEDICINE

## 2022-06-14 PROCEDURE — 3074F SYST BP LT 130 MM HG: CPT | Mod: CPTII,S$GLB,, | Performed by: INTERNAL MEDICINE

## 2022-06-14 PROCEDURE — 1159F PR MEDICATION LIST DOCUMENTED IN MEDICAL RECORD: ICD-10-PCS | Mod: CPTII,S$GLB,, | Performed by: INTERNAL MEDICINE

## 2022-06-14 PROCEDURE — 1101F PT FALLS ASSESS-DOCD LE1/YR: CPT | Mod: CPTII,S$GLB,, | Performed by: INTERNAL MEDICINE

## 2022-06-14 PROCEDURE — 1159F MED LIST DOCD IN RCRD: CPT | Mod: CPTII,S$GLB,, | Performed by: INTERNAL MEDICINE

## 2022-06-14 PROCEDURE — 3079F PR MOST RECENT DIASTOLIC BLOOD PRESSURE 80-89 MM HG: ICD-10-PCS | Mod: CPTII,S$GLB,, | Performed by: INTERNAL MEDICINE

## 2022-06-14 PROCEDURE — 1160F RVW MEDS BY RX/DR IN RCRD: CPT | Mod: CPTII,S$GLB,, | Performed by: INTERNAL MEDICINE

## 2022-06-14 PROCEDURE — 1101F PR PT FALLS ASSESS DOC 0-1 FALLS W/OUT INJ PAST YR: ICD-10-PCS | Mod: CPTII,S$GLB,, | Performed by: INTERNAL MEDICINE

## 2022-06-14 PROCEDURE — 3288F FALL RISK ASSESSMENT DOCD: CPT | Mod: CPTII,S$GLB,, | Performed by: INTERNAL MEDICINE

## 2022-06-14 PROCEDURE — 99214 OFFICE O/P EST MOD 30 MIN: CPT | Mod: S$GLB,,, | Performed by: INTERNAL MEDICINE

## 2022-06-14 PROCEDURE — 93000 EKG 12-LEAD: ICD-10-PCS | Mod: S$GLB,,, | Performed by: INTERNAL MEDICINE

## 2022-06-14 RX ORDER — MELOXICAM 15 MG/1
15 TABLET ORAL DAILY
COMMUNITY

## 2022-06-14 NOTE — ASSESSMENT & PLAN NOTE
Out encourage him to start on magnesium oxide 400 mg 1 tablet daily.  And continue on other medications same doses including Cozaar and Crestor.  May also consider adding colchicine some Q10 200 mg once a day

## 2022-06-14 NOTE — ASSESSMENT & PLAN NOTE
Blood pressure is 126/80 mmHg encouraged him to continue on a 10 arm and at 25 mg a day continue on losartan 25 mg once daily as well and continue on low-fat low-cholesterol diet

## 2022-06-14 NOTE — PROGRESS NOTES
Subjective:    Patient ID:  Jose Marti is a 77 y.o. male patient here for evaluation Hypertension, Hyperlipidemia, and Mitral valve insufficiency      History of Present Illness:   Patient is a 77-year-old gentleman with history of arterial hypertension dyslipidemia and coronary artery disease is here for follow-up evaluation.  He had questions about aspirin as well as meloxicam discussed the same.  He has been doing reasonably well from a cardiac perspective with no symptoms of shortness of breath chest discomfort or palpitations.  He does experience some leg cramps at nighttime.  No cough or congestion no fevers chills no nausea vomiting no blood in the stools          Review of patient's allergies indicates:   Allergen Reactions    Metoprolol succinate Hives     Broke out in hives        History reviewed. No pertinent past medical history.  Past Surgical History:   Procedure Laterality Date    BACK SURGERY  10/2018    Dr Menon-Lumbar  fusion L3-L5    CARDIAC SURGERY  2017    Dr MARK [facility]cial-[facility]- 2 vessel CABG    COLONOSCOPY  11/18/2013    Dr Ramos DUE 11/18/2018    GALLBLADDER SURGERY      TONSILLECTOMY       Social History     Tobacco Use    Smoking status: Never Smoker    Smokeless tobacco: Never Used   Substance Use Topics    Alcohol use: Yes    Drug use: Never        Review of Systems:    As noted in HPI in addition      REVIEW OF SYSTEMS  CARDIOVASCULAR: No recent chest pain, palpitations, arm, neck, or jaw pain  RESPIRATORY: No recent fever, cough chills, SOB or congestion  : No blood in the urine  GI: No Nausea, vomiting, constipation, diarrhea, blood, or reflux.  MUSCULOSKELETAL: No myalgias  NEURO: No lightheadedness or dizziness  EYES: No Double vision, blurry, vision or headache              Objective        Vitals:    06/14/22 1424   BP: 126/82   Pulse: 62       LIPIDS - LAST 2   Lab Results   Component Value Date    CHOL 126 01/24/2022    CHOL 137 07/19/2021    HDL 52 01/24/2022     HDL 51 07/19/2021    LDLCALC 52 01/24/2022    LDLCALC 66 07/19/2021    TRIG 135 01/24/2022    TRIG 115 07/19/2021    CHOLHDL 2.4 01/24/2022    CHOLHDL 2.7 07/19/2021       CBC - LAST 2  Lab Results   Component Value Date    WBC 2.9 (L) 01/24/2022    WBC 2.8 (L) 01/22/2021    RBC 4.76 01/24/2022    RBC 4.67 01/22/2021    HGB 14.4 01/24/2022    HGB 14.5 01/22/2021    HCT 42.6 01/24/2022    HCT 42.6 01/22/2021    MCV 89.5 01/24/2022    MCV 91.2 01/22/2021    MCH 30.3 01/24/2022    MCH 31.0 01/22/2021    MCHC 33.8 01/24/2022    MCHC 34.0 01/22/2021    RDW 13.0 01/24/2022    RDW 13.0 01/22/2021     01/24/2022     01/22/2021    MPV 10.8 01/24/2022    MPV 11.0 01/22/2021    GRAN 1.74 (L) 05/15/2009    GRAN 53.7 05/15/2009    LYMPH 1,241 01/24/2022    LYMPH 42.8 01/24/2022    MONO 310 01/24/2022    MONO 10.7 01/24/2022    BASO 0 01/24/2022    BASO 0 01/22/2021       CHEMISTRY & LIVER FUNCTION - LAST 2  Lab Results   Component Value Date     01/24/2022     07/19/2021    K 4.6 01/24/2022    K 4.3 07/19/2021     01/24/2022     07/19/2021    CO2 30 01/24/2022    CO2 25 07/19/2021    BUN 15 01/24/2022    BUN 16 07/19/2021    CREATININE 0.90 01/24/2022    CREATININE 0.84 07/19/2021     (H) 01/24/2022     (H) 07/19/2021    CALCIUM 9.2 01/24/2022    CALCIUM 9.3 07/19/2021    ALBUMIN 4.3 01/24/2022    ALBUMIN 4.1 07/19/2021    PROT 6.6 01/24/2022    PROT 6.1 07/19/2021    ALKPHOS 65 01/24/2020    ALKPHOS 72 05/15/2009    ALT 20 01/24/2022    ALT 21 07/19/2021    AST 22 01/24/2022    AST 21 07/19/2021    BILITOT 1.8 (H) 01/24/2022    BILITOT 1.5 (H) 07/19/2021        CARDIAC PROFILE - LAST 2  No results found for: BNP, CPK, CPKMB, LDH, TROPONINI     COAGULATION - LAST 2  No results found for: LABPT, INR, APTT    ENDOCRINE & PSA - LAST 2  Lab Results   Component Value Date    MICROALBUR 1.0 01/24/2022    TSH 1.73 01/24/2022    TSH 1.15 01/24/2020    PSA 0.70 05/15/2009         ECHOCARDIOGRAM RESULTS  No results found for this or any previous visit.      CURRENT/PREVIOUS VISIT EKG  Results for orders placed or performed in visit on 05/05/21   IN OFFICE EKG 12-LEAD (to [place])    Collection Time: 05/05/21 10:07 AM    Narrative    Test Reason : I10,    Vent. Rate : 066 BPM     Atrial Rate : 066 BPM     P-R Int : 194 ms          QRS Dur : 118 ms      QT Int : 402 ms       P-R-T Axes : 035 -13 033 degrees     QTc Int : 421 ms    Normal sinus rhythm  Nonspecific intraventricular conduction delay  Borderline Abnormal ECG  No previous ECGs available  Confirmed by Misbah BORREGO, Dylan SIMON ([id number]) on 5/7/2021 6:24:17 PM    Referred By: [email or web address]   SELF           Confirmed By:Dylan Crawley MD     No valid procedures specified.   No results found for this or any previous visit.    No valid procedures specified.    PHYSICAL EXAM  CONSTITUTIONAL: Well built, well nourished in no apparent distress  NECK: no carotid bruit, no JVD  LUNGS: CTA  CHEST WALL: no tenderness  HEART: regular rate and rhythm, S1, S2 normal, no murmur, click, rub or gallop   ABDOMEN: soft, non-tender; bowel sounds normal; no masses,  no organomegaly  EXTREMITIES: Extremities normal, no edema, no calf tenderness noted  Good distal pulses are noted in lower extremities.  NEURO: AAO X 3    I HAVE REVIEWED :    The vital signs, nurses notes, and all the pertinent radiology and labs.        Current Outpatient Medications   Medication Instructions    aspirin 325 mg, Oral, Daily    atenoloL (TENORMIN) 25 mg, Oral, Daily    calcium carbonate-vitamin D3 600mg (1,000mg) -1,000 unit Tab Oral, Once    cinnamon bark 1,000 mg, Oral, Daily    cyclobenzaprine (FLEXERIL) 10 MG tablet prn    losartan (COZAAR) 25 MG tablet TAKE 1 TABLET DAILY    meloxicam (MOBIC) 15 mg, Oral, Daily    metroNIDAZOLE (METROGEL) 0.75 % gel Topical (Top), 2 times daily    multivitamin (THERAGRAN) per tablet 1 tablet, Oral, Daily    omega 3-dha-epa-fish oil  1,000 mg (120 mg-180 mg) Cap No dose, route, or frequency recorded.    omeprazole (PRILOSEC) 20 mg, Oral, Daily    rosuvastatin (CRESTOR) 20 mg, Oral, Nightly    tamsulosin (FLOMAX) 0.4 mg, Oral, Daily    triamcinolone acetonide 0.025% (KENALOG) 0.025 % cream Topical (Top), 2 times daily      06/14/2022 EKG shows normal sinus rhythm and is within normal limits.    Assessment & Plan     Atherosclerotic heart disease of native coronary artery with other forms of angina pectoris  I have encouraged him to changes aspirin to 81 mg daily continue on risk factor modification maintain on Crestor 20 mg daily maintain low-fat low-cholesterol diet increase his physical activity and muscle strengthening    Essential (primary) hypertension  Blood pressure is 126/80 mmHg encouraged him to continue on a 10 arm and at 25 mg a day continue on losartan 25 mg once daily as well and continue on low-fat low-cholesterol diet    Mixed hyperlipidemia  Continue Crestor low-fat diet.  Also    Nonrheumatic tricuspid valve regurgitation  Appears to be relatively stable.  Noted arm significant symptoms of hepatic or of hepatic congestion is noted.    Gastro-esophageal reflux disease without esophagitis  Continue on Prilosec at 20 mg a day    Leg cramps  Out encourage him to start on magnesium oxide 400 mg 1 tablet daily.  And continue on other medications same doses including Cozaar and Crestor.  May also consider adding colchicine some Q10 200 mg once a day    Last LDL cholesterol is therapeutic at 58. I have encouraged to continue on strict diet and exercise       Follow up in about 6 months (around 12/14/2022).

## 2022-06-14 NOTE — ASSESSMENT & PLAN NOTE
Appears to be relatively stable.  Noted arm significant symptoms of hepatic or of hepatic congestion is noted.

## 2022-06-14 NOTE — ASSESSMENT & PLAN NOTE
I have encouraged him to changes aspirin to 81 mg daily continue on risk factor modification maintain on Crestor 20 mg daily maintain low-fat low-cholesterol diet increase his physical activity and muscle strengthening

## 2022-08-04 ENCOUNTER — OFFICE VISIT (OUTPATIENT)
Dept: FAMILY MEDICINE | Facility: CLINIC | Age: 77
End: 2022-08-04
Payer: MEDICARE

## 2022-08-04 VITALS
BODY MASS INDEX: 29.16 KG/M2 | DIASTOLIC BLOOD PRESSURE: 62 MMHG | HEART RATE: 77 BPM | SYSTOLIC BLOOD PRESSURE: 110 MMHG | HEIGHT: 73 IN | WEIGHT: 220 LBS

## 2022-08-04 DIAGNOSIS — N40.0 ENLARGED PROSTATE WITHOUT LOWER URINARY TRACT SYMPTOMS (LUTS): ICD-10-CM

## 2022-08-04 DIAGNOSIS — R10.12 LEFT UPPER QUADRANT ABDOMINAL PAIN: Primary | ICD-10-CM

## 2022-08-04 DIAGNOSIS — I25.118 ATHEROSCLEROTIC HEART DISEASE OF NATIVE CORONARY ARTERY WITH OTHER FORMS OF ANGINA PECTORIS: ICD-10-CM

## 2022-08-04 DIAGNOSIS — I10 ESSENTIAL (PRIMARY) HYPERTENSION: ICD-10-CM

## 2022-08-04 DIAGNOSIS — E78.2 MIXED HYPERLIPIDEMIA: ICD-10-CM

## 2022-08-04 DIAGNOSIS — Z95.1 HISTORY OF CORONARY ARTERY BYPASS GRAFT X 2: ICD-10-CM

## 2022-08-04 DIAGNOSIS — K21.9 GASTRO-ESOPHAGEAL REFLUX DISEASE WITHOUT ESOPHAGITIS: ICD-10-CM

## 2022-08-04 PROCEDURE — 1159F MED LIST DOCD IN RCRD: CPT | Mod: CPTII,S$GLB,, | Performed by: FAMILY MEDICINE

## 2022-08-04 PROCEDURE — 3288F PR FALLS RISK ASSESSMENT DOCUMENTED: ICD-10-PCS | Mod: CPTII,S$GLB,, | Performed by: FAMILY MEDICINE

## 2022-08-04 PROCEDURE — 3288F FALL RISK ASSESSMENT DOCD: CPT | Mod: CPTII,S$GLB,, | Performed by: FAMILY MEDICINE

## 2022-08-04 PROCEDURE — 1101F PR PT FALLS ASSESS DOC 0-1 FALLS W/OUT INJ PAST YR: ICD-10-PCS | Mod: CPTII,S$GLB,, | Performed by: FAMILY MEDICINE

## 2022-08-04 PROCEDURE — 1101F PT FALLS ASSESS-DOCD LE1/YR: CPT | Mod: CPTII,S$GLB,, | Performed by: FAMILY MEDICINE

## 2022-08-04 PROCEDURE — 99214 OFFICE O/P EST MOD 30 MIN: CPT | Mod: S$GLB,,, | Performed by: FAMILY MEDICINE

## 2022-08-04 PROCEDURE — 3078F PR MOST RECENT DIASTOLIC BLOOD PRESSURE < 80 MM HG: ICD-10-PCS | Mod: CPTII,S$GLB,, | Performed by: FAMILY MEDICINE

## 2022-08-04 PROCEDURE — 99214 PR OFFICE/OUTPT VISIT, EST, LEVL IV, 30-39 MIN: ICD-10-PCS | Mod: S$GLB,,, | Performed by: FAMILY MEDICINE

## 2022-08-04 PROCEDURE — 1159F PR MEDICATION LIST DOCUMENTED IN MEDICAL RECORD: ICD-10-PCS | Mod: CPTII,S$GLB,, | Performed by: FAMILY MEDICINE

## 2022-08-04 PROCEDURE — 3078F DIAST BP <80 MM HG: CPT | Mod: CPTII,S$GLB,, | Performed by: FAMILY MEDICINE

## 2022-08-04 PROCEDURE — 3074F PR MOST RECENT SYSTOLIC BLOOD PRESSURE < 130 MM HG: ICD-10-PCS | Mod: CPTII,S$GLB,, | Performed by: FAMILY MEDICINE

## 2022-08-04 PROCEDURE — 3074F SYST BP LT 130 MM HG: CPT | Mod: CPTII,S$GLB,, | Performed by: FAMILY MEDICINE

## 2022-08-04 RX ORDER — ACETAMINOPHEN 160 MG/5ML
200 SUSPENSION, ORAL (FINAL DOSE FORM) ORAL DAILY
COMMUNITY

## 2022-08-04 RX ORDER — OMEPRAZOLE 20 MG/1
20 CAPSULE, DELAYED RELEASE ORAL DAILY
Qty: 90 CAPSULE | Refills: 1 | Status: SHIPPED | OUTPATIENT
Start: 2022-08-04 | End: 2023-01-17 | Stop reason: SDUPTHER

## 2022-08-04 RX ORDER — ASPIRIN 81 MG/1
81 TABLET ORAL DAILY
COMMUNITY

## 2022-08-04 RX ORDER — MAGNESIUM GLUCONATE 27.5 (500)
TABLET ORAL ONCE
COMMUNITY

## 2022-08-04 RX ORDER — GLUCOSAMINE/D3/BOSWELLIA SERRA 1500MG-400
TABLET ORAL
COMMUNITY

## 2022-08-04 NOTE — PROGRESS NOTES
SUBJECTIVE:    Patient ID: Jose Marti is a 77 y.o. male.    Chief Complaint: Follow-up and Medication Refill (Brought medication list //  discuss about multiple issues //abc )    77-year-old male who is active with mowing the lawn and weed eating.  He also works on Wednesday at the [employer] in [place].    He complains of 6 weeks of left lower back pain and left upper quadrant abdominal pain when reclining.  Worse when he lays on his left side and relieved by rolling over to his right side or standing up or sitting up.  This pain is dull and constant and very positional.  He cannot sleep when the pain is there.  He is worried that he may have a tumor a kidney problem.  He denies any chest pain or shortness of breath.  No nausea vomiting weight loss rectal bleeding fever chills.    2020-colonoscopy with Dr. ortiz-no RTC necessary.    Ten years ago had cholecystectomy with Dr. Koo     2017-CABG    2018-lumbar disc surgery- Dr. tam Menon    Right knee injected x3 with Dr. Armijo      No visits with results within 6 Month(s) from this visit.   Latest known visit with results is:   Telephone on 01/20/2022   Component Date Value Ref Range Status    WBC 01/24/2022 2.9 (A) 3.8 - 10.8 Thousand/uL Final    RBC 01/24/2022 4.76  4.20 - 5.80 Million/uL Final    Hemoglobin 01/24/2022 14.4  13.2 - 17.1 g/dL Final    Hematocrit 01/24/2022 42.6  38.5 - 50.0 % Final    MCV 01/24/2022 89.5  80.0 - 100.0 fL Final    MCH 01/24/2022 30.3  27.0 - 33.0 pg Final    MCHC 01/24/2022 33.8  32.0 - 36.0 g/dL Final    RDW 01/24/2022 13.0  11.0 - 15.0 % Final    Platelets 01/24/2022 150  140 - 400 Thousand/uL Final    MPV 01/24/2022 10.8  7.5 - 12.5 fL Final    Neutrophils, Abs 01/24/2022 1,279 (A) 1,500 - 7,800 cells/uL Final    Lymph # 01/24/2022 1,241  850 - 3,900 cells/uL Final    Mono # 01/24/2022 310  200 - 950 cells/uL Final    Eos # 01/24/2022 70  15 - 500 cells/uL Final    Baso # 01/24/2022 0  0 - 200  cells/uL Final    Neutrophils Relative 01/24/2022 44.1  % Final    Lymph % 01/24/2022 42.8  % Final    Mono % 01/24/2022 10.7  % Final    Eosinophil % 01/24/2022 2.4  % Final    Basophil % 01/24/2022 0.0  % Final    Glucose 01/24/2022 100 (A) 65 - 99 mg/dL Final    BUN 01/24/2022 15  7 - 25 mg/dL Final    Creatinine 01/24/2022 0.90  0.70 - 1.18 mg/dL Final    eGFR if non  01/24/2022 83  > OR = 60 mL/min/1.73m2 Final    eGFR if  01/24/2022 96  > OR = 60 mL/min/1.73m2 Final    BUN/Creatinine Ratio 01/24/2022 NOT APPLICABLE  6 - 22 (calc) Final    Sodium 01/24/2022 141  135 - 146 mmol/L Final    Potassium 01/24/2022 4.6  3.5 - 5.3 mmol/L Final    Chloride 01/24/2022 106  98 - 110 mmol/L Final    CO2 01/24/2022 30  20 - 32 mmol/L Final    Calcium 01/24/2022 9.2  8.6 - 10.3 mg/dL Final    Total Protein 01/24/2022 6.6  6.1 - 8.1 g/dL Final    Albumin 01/24/2022 4.3  3.6 - 5.1 g/dL Final    Globulin, Total 01/24/2022 2.3  1.9 - 3.7 g/dL (calc) Final    Albumin/Globulin Ratio 01/24/2022 1.9  1.0 - 2.5 (calc) Final    Total Bilirubin 01/24/2022 1.8 (A) 0.2 - 1.2 mg/dL Final    Alkaline Phosphatase 01/24/2022 55  35 - 144 U/L Final    AST 01/24/2022 22  10 - 35 U/L Final    ALT 01/24/2022 20  9 - 46 U/L Final    Cholesterol 01/24/2022 126  <200 mg/dL Final    HDL 01/24/2022 52  > OR = 40 mg/dL Final    Triglycerides 01/24/2022 135  <150 mg/dL Final    LDL Cholesterol 01/24/2022 52  mg/dL (calc) Final    HDL/Cholesterol Ratio 01/24/2022 2.4  <5.0 (calc) Final    Non HDL Chol. (LDL+VLDL) 01/24/2022 74  <130 mg/dL (calc) Final    Creatinine, Urine 01/24/2022 242  20 - 320 mg/dL Final    Microalb, Ur 01/24/2022 1.0  See Note: mg/dL Final    Microalb/Creat Ratio 01/24/2022 4  <30 mcg/mg creat Final    TSH 01/24/2022 1.73  0.40 - 4.50 mIU/L Final    Color, UA 01/24/2022 DARK YELLOW  YELLOW Final    Appearance, UA 01/24/2022 CLEAR  CLEAR Final    Specific [place],  UA 01/24/2022 1.024  1.001 - 1.035 Final    pH, UA 01/24/2022 < OR = 5.0  5.0 - 8.0 Final    Glucose, UA 01/24/2022 NEGATIVE  NEGATIVE Final    Bilirubin, UA 01/24/2022 NEGATIVE  NEGATIVE Final    Ketones, UA 01/24/2022 NEGATIVE  NEGATIVE Final    Occult Blood UA 01/24/2022 NEGATIVE  NEGATIVE Final    Protein, UA 01/24/2022 NEGATIVE  NEGATIVE Final    Nitrite, UA 01/24/2022 NEGATIVE  NEGATIVE Final    Leukocytes, UA 01/24/2022 NEGATIVE  NEGATIVE Final    WBC Casts, UA 01/24/2022 NONE SEEN  < OR = 5 /HPF Final    RBC Casts, UA 01/24/2022 0-2  < OR = 2 /HPF Final    Squam Epithel, UA 01/24/2022 NONE SEEN  < OR = 5 /HPF Final    Bacteria, UA 01/24/2022 NONE SEEN  NONE SEEN /HPF Final    Hyaline Casts, UA 01/24/2022 NONE SEEN  NONE SEEN /LPF Final    Reflexive Urine Culture 01/24/2022    Final       No past medical history on file.  Social History     Socioeconomic History    Marital status:    Tobacco Use    Smoking status: Never Smoker    Smokeless tobacco: Never Used   Substance and Sexual Activity    Alcohol use: Yes    Drug use: Never    Sexual activity: Yes     Partners: Female     Social Determinants of Health     Financial Resource Strain: Low Risk     Difficulty of Paying Living Expenses: Not hard at all   Food Insecurity: No Food Insecurity    Worried About Running Out of Food in the Last Year: Never true    Ran Out of Food in the Last Year: Never true   Transportation Needs: No Transportation Needs    Lack of Transportation (Medical): No    Lack of Transportation (Non-Medical): No   Physical Activity: Insufficiently Active    Days of Exercise per Week: 1 day    Minutes of Exercise per Session: 60 min   Stress: No Stress Concern Present    Feeling of Stress : Not at all   Social Connections: Unknown    Frequency of Communication with Friends and Family: More than three times a week    Frequency of Social Gatherings with Friends and Family: Once a week    Active Member of  Clubs or Organizations: Yes    Attends Club or Organization Meetings: More than 4 times per year    Marital Status:    Housing Stability: Low Risk     Unable to Pay for Housing in the Last Year: No    Number of Places Lived in the Last Year: 1    Unstable Housing in the Last Year: No     Past Surgical History:   Procedure Laterality Date    BACK SURGERY  10/2018    Dr Menon-Lumbar  fusion L3-L5    CARDIAC SURGERY  2017    Dr MARK Marcial-[facility]- 2 vessel CABG    COLONOSCOPY  11/18/2013    Dr Ramos DUE 11/18/2018    GALLBLADDER SURGERY      TONSILLECTOMY       No family history on file.    Review of patient's allergies indicates:   Allergen Reactions    Metoprolol succinate Hives     Broke out in hives        Current Outpatient Medications:     aspirin (ECOTRIN) 81 MG EC tablet, Take 81 mg by mouth once daily., Disp: , Rfl:     aspirin 325 MG tablet, Take 325 mg by mouth once daily., Disp: , Rfl:     atenoloL (TENORMIN) 25 MG tablet, TAKE 1 TABLET DAILY, Disp: 90 tablet, Rfl: 3    calcium carbonate-vitamin D3 600mg (1,000mg) -1,000 unit Tab, Take by mouth once., Disp: , Rfl:     cinnamon bark 500 mg capsule, Take 1,000 mg by mouth once daily., Disp: , Rfl:     coenzyme Q10 200 mg capsule, Take 200 mg by mouth once daily., Disp: , Rfl:     cyclobenzaprine (FLEXERIL) 10 MG tablet, prn, Disp: , Rfl:     glucosamine-D3-[place] serr (OSTEO BI-FLEX, 5-LOXIN,) 1,500-400-100 mg-unit-mg Tab, Take by mouth., Disp: , Rfl:     losartan (COZAAR) 25 MG tablet, TAKE 1 TABLET DAILY, Disp: 90 tablet, Rfl: 3    magnesium gluconate 27.5 mg magne- sium (500 mg) Tab, Take by mouth once., Disp: , Rfl:     meloxicam (MOBIC) 15 MG tablet, Take 15 mg by mouth once daily., Disp: , Rfl:     metroNIDAZOLE (METROGEL) 0.75 % gel, Apply topically 2 (two) times daily., Disp: , Rfl:     multivitamin (THERAGRAN) per tablet, Take 1 tablet by mouth once daily., Disp: , Rfl:     omega 3-dha-epa-fish oil 1,000 mg (120 mg-180  "mg) Cap, , Disp: , Rfl:     rosuvastatin (CRESTOR) 20 MG tablet, Take 1 tablet (20 mg total) by mouth nightly., Disp: 90 tablet, Rfl: 3    tamsulosin (FLOMAX) 0.4 mg Cap, Take 1 capsule (0.4 mg total) by mouth once daily., Disp: 90 capsule, Rfl: 3    triamcinolone acetonide 0.025% (KENALOG) 0.025 % cream, Apply topically 2 (two) times daily., Disp: 30 g, Rfl: 2    atenoloL (TENORMIN) 25 MG tablet, Take 1 tablet (25 mg total) by mouth once daily., Disp: 90 tablet, Rfl: 3    omeprazole (PRILOSEC) 20 MG capsule, Take 1 capsule (20 mg total) by mouth once daily., Disp: 90 capsule, Rfl: 1    Review of Systems   Constitutional: Negative for appetite change, chills, fatigue, fever and unexpected weight change.   HENT: Negative for congestion, ear pain and trouble swallowing.    Eyes: Negative for pain, discharge and visual disturbance.   Respiratory: Negative for apnea, cough, shortness of breath and wheezing.    Cardiovascular: Negative for chest pain and leg swelling.   Gastrointestinal: Positive for abdominal pain. Negative for blood in stool, constipation, diarrhea, nausea and vomiting.   Endocrine: Negative for cold intolerance, heat intolerance and polydipsia.   Genitourinary: Negative for dysuria, hematuria, testicular pain and urgency.   Musculoskeletal: Positive for back pain. Negative for gait problem, joint swelling and myalgias.   Neurological: Negative for dizziness, seizures and numbness.   Psychiatric/Behavioral: Negative for agitation, behavioral problems and hallucinations. The patient is not nervous/anxious.           Objective:      Vitals:    08/04/22 0839   BP: 110/62   Pulse: 77   Weight: 99.8 kg (220 lb)   Height: 6' 1" (1.854 m)     Physical Exam  Vitals and nursing note reviewed.   Constitutional:       General: He is not in acute distress.     Appearance: Normal appearance. He is well-developed. He is not toxic-appearing.   HENT:      Head: Normocephalic and atraumatic.      Right Ear: " Tympanic membrane and external ear normal.      Left Ear: Tympanic membrane and external ear normal.      Nose: Nose normal.      Mouth/Throat:      Pharynx: Oropharynx is clear.   Eyes:      Pupils: Pupils are equal, round, and reactive to light.   Neck:      Thyroid: No thyromegaly.      Vascular: No carotid bruit.   Cardiovascular:      Rate and Rhythm: Normal rate and regular rhythm.      Heart sounds: Normal heart sounds. No murmur heard.  Pulmonary:      Effort: Pulmonary effort is normal.      Breath sounds: Normal breath sounds. No wheezing or rales.   Abdominal:      General: Bowel sounds are normal. There is no distension.      Palpations: Abdomen is soft. There is no mass.      Tenderness: There is no abdominal tenderness. There is no guarding or rebound.      Hernia: No hernia is present.   Musculoskeletal:         General: No tenderness or deformity. Normal range of motion.      Cervical back: Normal range of motion and neck supple.      Lumbar back: Normal. No spasms.      Comments: Bends 90 degrees at  waist shoulders knees good range of motion no pitting edema to the lower extremities   Lymphadenopathy:      Cervical: No cervical adenopathy.   Skin:     General: Skin is warm and dry.      Findings: No rash.   Neurological:      Mental Status: He is alert and oriented to person, place, and time.      Cranial Nerves: No cranial nerve deficit.      Coordination: Coordination normal.   Psychiatric:         Behavior: Behavior normal.         Thought Content: Thought content normal.         Judgment: Judgment normal.           Assessment:       1. Left upper quadrant abdominal pain    2. Gastro-esophageal reflux disease without esophagitis    3. Essential (primary) hypertension    4. Atherosclerotic heart disease of native coronary artery with other forms of angina pectoris    5. History of coronary artery bypass graft x 2    6. Mixed hyperlipidemia    7. Enlarged prostate without lower urinary tract  symptoms (luts)         Plan:       Left upper quadrant abdominal pain  -     CT Abdomen Pelvis W Wo Contrast; Future; Expected date: 08/04/2022  Patient has unexplained abdominal and flank pain.  x6 weeks.  Will get CT scan to delineate renal versus other organic issues, rule out malignancy  Gastro-esophageal reflux disease without esophagitis  -     omeprazole (PRILOSEC) 20 MG capsule; Take 1 capsule (20 mg total) by mouth once daily.  Dispense: 90 capsule; Refill: 1  Refill omeprazole  Essential (primary) hypertension  -     CBC Auto Differential; Future; Expected date: 08/04/2022  -     Lipid Panel; Future; Expected date: 08/04/2022  -     Comprehensive Metabolic Panel; Future; Expected date: 08/04/2022  -     Urinalysis, Reflex to Urine Culture Urine, Clean Catch; Future; Expected date: 08/04/2022  Blood pressure well controlled, routine lab work ordered  Atherosclerotic heart disease of native coronary artery with other forms of angina pectoris    History of coronary artery bypass graft x 2  No angina lately  Mixed hyperlipidemia  Lipid panel due now  Enlarged prostate without lower urinary tract symptoms (luts)      No follow-ups on file.        8/4/2022 Abdon Schuler

## 2022-08-07 LAB
ALBUMIN SERPL-MCNC: 4.2 G/DL (ref 3.6–5.1)
ALBUMIN/GLOB SERPL: 2.1 (CALC) (ref 1–2.5)
ALP SERPL-CCNC: 50 U/L (ref 35–144)
ALT SERPL-CCNC: 25 U/L (ref 9–46)
APPEARANCE UR: CLEAR
AST SERPL-CCNC: 22 U/L (ref 10–35)
BACTERIA #/AREA URNS HPF: ABNORMAL /HPF
BACTERIA UR CULT: ABNORMAL
BACTERIA UR CULT: ABNORMAL
BASOPHILS # BLD AUTO: 10 CELLS/UL (ref 0–200)
BASOPHILS NFR BLD AUTO: 0.4 %
BILIRUB SERPL-MCNC: 1.8 MG/DL (ref 0.2–1.2)
BILIRUB UR QL STRIP: NEGATIVE
BUN SERPL-MCNC: 14 MG/DL (ref 7–25)
BUN/CREAT SERPL: ABNORMAL (CALC) (ref 6–22)
CALCIUM SERPL-MCNC: 9.4 MG/DL (ref 8.6–10.3)
CHLORIDE SERPL-SCNC: 107 MMOL/L (ref 98–110)
CHOLEST SERPL-MCNC: 129 MG/DL
CHOLEST/HDLC SERPL: 2.6 (CALC)
CO2 SERPL-SCNC: 29 MMOL/L (ref 20–32)
COLOR UR: YELLOW
CREAT SERPL-MCNC: 0.92 MG/DL (ref 0.7–1.28)
EGFR: 86 ML/MIN/1.73M2
EOSINOPHIL # BLD AUTO: 49 CELLS/UL (ref 15–500)
EOSINOPHIL NFR BLD AUTO: 1.9 %
ERYTHROCYTE [DISTWIDTH] IN BLOOD BY AUTOMATED COUNT: 12.9 % (ref 11–15)
GLOBULIN SER CALC-MCNC: 2 G/DL (CALC) (ref 1.9–3.7)
GLUCOSE SERPL-MCNC: 95 MG/DL (ref 65–99)
GLUCOSE UR QL STRIP: NEGATIVE
HCT VFR BLD AUTO: 42.6 % (ref 38.5–50)
HDLC SERPL-MCNC: 49 MG/DL
HGB BLD-MCNC: 13.9 G/DL (ref 13.2–17.1)
HGB UR QL STRIP: NEGATIVE
HYALINE CASTS #/AREA URNS LPF: ABNORMAL /LPF
KETONES UR QL STRIP: NEGATIVE
LDLC SERPL CALC-MCNC: 59 MG/DL (CALC)
LEUKOCYTE ESTERASE UR QL STRIP: ABNORMAL
LYMPHOCYTES # BLD AUTO: 897 CELLS/UL (ref 850–3900)
LYMPHOCYTES NFR BLD AUTO: 34.5 %
MCH RBC QN AUTO: 30.1 PG (ref 27–33)
MCHC RBC AUTO-ENTMCNC: 32.6 G/DL (ref 32–36)
MCV RBC AUTO: 92.2 FL (ref 80–100)
MONOCYTES # BLD AUTO: 247 CELLS/UL (ref 200–950)
MONOCYTES NFR BLD AUTO: 9.5 %
NEUTROPHILS # BLD AUTO: 1396 CELLS/UL (ref 1500–7800)
NEUTROPHILS NFR BLD AUTO: 53.7 %
NITRITE UR QL STRIP: NEGATIVE
NONHDLC SERPL-MCNC: 80 MG/DL (CALC)
PH UR STRIP: 5.5 [PH] (ref 5–8)
PLATELET # BLD AUTO: 150 THOUSAND/UL (ref 140–400)
PMV BLD REES-ECKER: 10.3 FL (ref 7.5–12.5)
POTASSIUM SERPL-SCNC: 4.8 MMOL/L (ref 3.5–5.3)
PROT SERPL-MCNC: 6.2 G/DL (ref 6.1–8.1)
PROT UR QL STRIP: NEGATIVE
RBC # BLD AUTO: 4.62 MILLION/UL (ref 4.2–5.8)
RBC #/AREA URNS HPF: ABNORMAL /HPF
SERVICE CMNT-IMP: ABNORMAL
SODIUM SERPL-SCNC: 142 MMOL/L (ref 135–146)
SP GR UR STRIP: 1.01 (ref 1–1.03)
SQUAMOUS #/AREA URNS HPF: ABNORMAL /HPF
TRIGL SERPL-MCNC: 130 MG/DL
WBC # BLD AUTO: 2.6 THOUSAND/UL (ref 3.8–10.8)
WBC #/AREA URNS HPF: ABNORMAL /HPF

## 2022-08-07 NOTE — PROGRESS NOTES
Call patient.  Lab work looks good.  He still has a low WBC count at 2.6 which is chronic for him.  But no anemia.  Cholesterol excellent at 129. Sugar kidneys and liver all normal.  Continue current medications

## 2022-08-08 ENCOUNTER — TELEPHONE (OUTPATIENT)
Dept: FAMILY MEDICINE | Facility: CLINIC | Age: 77
End: 2022-08-08

## 2022-08-08 NOTE — TELEPHONE ENCOUNTER
Spoke to wife, Meghan, with results verbatim per Dr Schuler. Verbalized understanding on all and will let patient know.

## 2022-08-15 DIAGNOSIS — R10.12 LEFT UPPER QUADRANT ABDOMINAL PAIN: Primary | ICD-10-CM

## 2022-08-15 DIAGNOSIS — R10.32 LEFT LOWER QUADRANT ABDOMINAL PAIN: ICD-10-CM

## 2022-08-16 ENCOUNTER — TELEPHONE (OUTPATIENT)
Dept: FAMILY MEDICINE | Facility: CLINIC | Age: 77
End: 2022-08-16

## 2022-08-16 DIAGNOSIS — N40.0 ENLARGED PROSTATE WITHOUT LOWER URINARY TRACT SYMPTOMS (LUTS): ICD-10-CM

## 2022-08-16 RX ORDER — TAMSULOSIN HYDROCHLORIDE 0.4 MG/1
0.4 CAPSULE ORAL DAILY
Qty: 90 CAPSULE | Refills: 1 | Status: SHIPPED | OUTPATIENT
Start: 2022-08-16 | End: 2023-01-30 | Stop reason: SDUPTHER

## 2022-08-18 ENCOUNTER — PATIENT MESSAGE (OUTPATIENT)
Dept: FAMILY MEDICINE | Facility: CLINIC | Age: 77
End: 2022-08-18

## 2022-08-18 ENCOUNTER — HOSPITAL ENCOUNTER (OUTPATIENT)
Dept: RADIOLOGY | Facility: HOSPITAL | Age: 77
Discharge: HOME | End: 2022-08-18
Attending: FAMILY MEDICINE
Payer: MEDICARE

## 2022-08-18 DIAGNOSIS — R10.12 LEFT UPPER QUADRANT ABDOMINAL PAIN: ICD-10-CM

## 2022-08-18 PROCEDURE — 76700 US EXAM ABDOM COMPLETE: CPT | Mod: TC

## 2022-08-18 NOTE — TELEPHONE ENCOUNTER
Nida Lanier  You 1 minute ago (12:05 PM)     JR    Per the ct scan order it was denied and an u/s needed to be performed. Unless the u/s is abnormal and we need to try to re do the ct scan auth then it will not be approved.

## 2022-08-21 NOTE — PROGRESS NOTES
Patient notified of his normal abdominal ultrasound results.  Left lower quadrant pain is now present and we will order CT scan of the abdomen and pelvis.

## 2022-08-30 ENCOUNTER — PATIENT MESSAGE (OUTPATIENT)
Dept: FAMILY MEDICINE | Facility: CLINIC | Age: 77
End: 2022-08-30

## 2022-08-31 ENCOUNTER — PATIENT MESSAGE (OUTPATIENT)
Dept: FAMILY MEDICINE | Facility: CLINIC | Age: 77
End: 2022-08-31

## 2022-09-15 ENCOUNTER — HOSPITAL ENCOUNTER (OUTPATIENT)
Dept: RADIOLOGY | Facility: HOSPITAL | Age: 77
Discharge: HOME | End: 2022-09-15
Attending: FAMILY MEDICINE
Payer: MEDICARE

## 2022-09-15 DIAGNOSIS — R10.32 LEFT LOWER QUADRANT ABDOMINAL PAIN: ICD-10-CM

## 2022-09-15 LAB
CREAT SERPL-MCNC: 1 MG/DL (ref 0.5–1.4)
SAMPLE: NORMAL

## 2022-09-15 PROCEDURE — 25500020 PHARM REV CODE 255: Mod: PO | Performed by: FAMILY MEDICINE

## 2022-09-15 PROCEDURE — 74177 CT ABD & PELVIS W/CONTRAST: CPT | Mod: TC,PO

## 2022-09-15 RX ADMIN — IOHEXOL 100 ML: 350 INJECTION, SOLUTION INTRAVENOUS at 10:09

## 2022-09-19 ENCOUNTER — IMMUNIZATION (OUTPATIENT)
Dept: PRIMARY CARE CLINIC | Facility: CLINIC | Age: 77
End: 2022-09-19
Payer: MEDICARE

## 2022-09-19 DIAGNOSIS — Z23 NEED FOR VACCINATION: Primary | ICD-10-CM

## 2022-09-19 PROCEDURE — 91312 COVID-19, MRNA, LNP-S, BIVALENT BOOSTER, PF, 30 MCG/0.3 ML DOSE: CPT | Mod: S$GLB,,, | Performed by: FAMILY MEDICINE

## 2022-09-19 PROCEDURE — 91312 COVID-19, MRNA, LNP-S, BIVALENT BOOSTER, PF, 30 MCG/0.3 ML DOSE: ICD-10-PCS | Mod: S$GLB,,, | Performed by: FAMILY MEDICINE

## 2022-09-19 PROCEDURE — 0124A COVID-19, MRNA, LNP-S, BIVALENT BOOSTER, PF, 30 MCG/0.3 ML DOSE: CPT | Mod: S$GLB,,, | Performed by: FAMILY MEDICINE

## 2022-09-19 PROCEDURE — 0124A COVID-19, MRNA, LNP-S, BIVALENT BOOSTER, PF, 30 MCG/0.3 ML DOSE: ICD-10-PCS | Mod: S$GLB,,, | Performed by: FAMILY MEDICINE

## 2022-09-19 NOTE — PROGRESS NOTES
Talked to patient and discuss the results of the CT abdomen and pelvis with him.  He has diverticulosis but no diverticulitis.  Small kidney cysts and a small left kidney stone.  No cancer was seen.

## 2022-10-10 ENCOUNTER — CLINICAL SUPPORT (OUTPATIENT)
Dept: FAMILY MEDICINE | Facility: CLINIC | Age: 77
End: 2022-10-10
Payer: MEDICARE

## 2022-10-10 DIAGNOSIS — Z23 NEED FOR INFLUENZA VACCINATION: Primary | ICD-10-CM

## 2022-10-10 PROCEDURE — G0008 FLU VACCINE - QUADRIVALENT - HIGH DOSE (65+) PRESERVATIVE FREE IM: ICD-10-PCS | Mod: S$GLB,,, | Performed by: FAMILY MEDICINE

## 2022-10-10 PROCEDURE — G0008 ADMIN INFLUENZA VIRUS VAC: HCPCS | Mod: S$GLB,,, | Performed by: FAMILY MEDICINE

## 2022-10-10 PROCEDURE — 90662 FLU VACCINE - QUADRIVALENT - HIGH DOSE (65+) PRESERVATIVE FREE IM: ICD-10-PCS | Mod: S$GLB,,, | Performed by: FAMILY MEDICINE

## 2022-10-10 PROCEDURE — 90662 IIV NO PRSV INCREASED AG IM: CPT | Mod: S$GLB,,, | Performed by: FAMILY MEDICINE

## 2022-10-17 ENCOUNTER — TELEPHONE (OUTPATIENT)
Dept: CARDIOLOGY | Facility: CLINIC | Age: 77
End: 2022-10-17
Payer: MEDICARE

## 2022-10-17 NOTE — TELEPHONE ENCOUNTER
----- Message from Addi Holcomb sent at 10/17/2022  5:02 PM CDT -----  Type:  Sooner Appointment Request    Caller is requesting a sooner appointment.  Caller declined first available appointment listed below.  Caller will not accept being placed on the waitlist and is requesting a message be sent to doctor.    Name of Caller:  Pt  When is the first available appointment?    Symptoms:  Letter for F/U in Dec  Best Call Back Number:  [phone]  Additional Information:  Please advise -- Thank you

## 2022-12-19 ENCOUNTER — PATIENT MESSAGE (OUTPATIENT)
Dept: CARDIOLOGY | Facility: CLINIC | Age: 77
End: 2022-12-19
Payer: MEDICARE

## 2023-01-16 ENCOUNTER — PATIENT MESSAGE (OUTPATIENT)
Dept: FAMILY MEDICINE | Facility: CLINIC | Age: 78
End: 2023-01-16

## 2023-01-16 DIAGNOSIS — K21.9 GASTRO-ESOPHAGEAL REFLUX DISEASE WITHOUT ESOPHAGITIS: ICD-10-CM

## 2023-01-17 RX ORDER — OMEPRAZOLE 20 MG/1
20 CAPSULE, DELAYED RELEASE ORAL DAILY
Qty: 90 CAPSULE | Refills: 3 | Status: SHIPPED | OUTPATIENT
Start: 2023-01-17 | End: 2024-01-17

## 2023-01-26 ENCOUNTER — OFFICE VISIT (OUTPATIENT)
Dept: CARDIOLOGY | Facility: CLINIC | Age: 78
End: 2023-01-26
Payer: MEDICARE

## 2023-01-26 VITALS
SYSTOLIC BLOOD PRESSURE: 134 MMHG | DIASTOLIC BLOOD PRESSURE: 82 MMHG | BODY MASS INDEX: 29.69 KG/M2 | RESPIRATION RATE: 16 BRPM | OXYGEN SATURATION: 97 % | HEIGHT: 73 IN | WEIGHT: 224 LBS | HEART RATE: 63 BPM

## 2023-01-26 DIAGNOSIS — Z95.1 HISTORY OF CORONARY ARTERY BYPASS GRAFT X 2: ICD-10-CM

## 2023-01-26 DIAGNOSIS — I25.118 ATHEROSCLEROTIC HEART DISEASE OF NATIVE CORONARY ARTERY WITH OTHER FORMS OF ANGINA PECTORIS: ICD-10-CM

## 2023-01-26 DIAGNOSIS — I34.0 NONRHEUMATIC MITRAL VALVE REGURGITATION: ICD-10-CM

## 2023-01-26 DIAGNOSIS — I10 ESSENTIAL (PRIMARY) HYPERTENSION: ICD-10-CM

## 2023-01-26 DIAGNOSIS — K21.9 GASTRO-ESOPHAGEAL REFLUX DISEASE WITHOUT ESOPHAGITIS: ICD-10-CM

## 2023-01-26 DIAGNOSIS — R25.2 LEG CRAMPS: ICD-10-CM

## 2023-01-26 DIAGNOSIS — E78.2 MIXED HYPERLIPIDEMIA: ICD-10-CM

## 2023-01-26 PROCEDURE — 3075F PR MOST RECENT SYSTOLIC BLOOD PRESS GE 130-139MM HG: ICD-10-PCS | Mod: CPTII,S$GLB,, | Performed by: INTERNAL MEDICINE

## 2023-01-26 PROCEDURE — 1159F MED LIST DOCD IN RCRD: CPT | Mod: CPTII,S$GLB,, | Performed by: INTERNAL MEDICINE

## 2023-01-26 PROCEDURE — 3288F PR FALLS RISK ASSESSMENT DOCUMENTED: ICD-10-PCS | Mod: CPTII,S$GLB,, | Performed by: INTERNAL MEDICINE

## 2023-01-26 PROCEDURE — 1159F PR MEDICATION LIST DOCUMENTED IN MEDICAL RECORD: ICD-10-PCS | Mod: CPTII,S$GLB,, | Performed by: INTERNAL MEDICINE

## 2023-01-26 PROCEDURE — 99999 PR PBB SHADOW E&M-EST. PATIENT-LVL IV: ICD-10-PCS | Mod: PBBFAC,,, | Performed by: INTERNAL MEDICINE

## 2023-01-26 PROCEDURE — 99214 PR OFFICE/OUTPT VISIT, EST, LEVL IV, 30-39 MIN: ICD-10-PCS | Mod: S$GLB,,, | Performed by: INTERNAL MEDICINE

## 2023-01-26 PROCEDURE — 1101F PR PT FALLS ASSESS DOC 0-1 FALLS W/OUT INJ PAST YR: ICD-10-PCS | Mod: CPTII,S$GLB,, | Performed by: INTERNAL MEDICINE

## 2023-01-26 PROCEDURE — 1160F PR REVIEW ALL MEDS BY PRESCRIBER/CLIN PHARMACIST DOCUMENTED: ICD-10-PCS | Mod: CPTII,S$GLB,, | Performed by: INTERNAL MEDICINE

## 2023-01-26 PROCEDURE — 1160F RVW MEDS BY RX/DR IN RCRD: CPT | Mod: CPTII,S$GLB,, | Performed by: INTERNAL MEDICINE

## 2023-01-26 PROCEDURE — 1126F PR PAIN SEVERITY QUANTIFIED, NO PAIN PRESENT: ICD-10-PCS | Mod: CPTII,S$GLB,, | Performed by: INTERNAL MEDICINE

## 2023-01-26 PROCEDURE — 1101F PT FALLS ASSESS-DOCD LE1/YR: CPT | Mod: CPTII,S$GLB,, | Performed by: INTERNAL MEDICINE

## 2023-01-26 PROCEDURE — 99999 PR PBB SHADOW E&M-EST. PATIENT-LVL IV: CPT | Mod: PBBFAC,,, | Performed by: INTERNAL MEDICINE

## 2023-01-26 PROCEDURE — 99214 OFFICE O/P EST MOD 30 MIN: CPT | Mod: S$GLB,,, | Performed by: INTERNAL MEDICINE

## 2023-01-26 PROCEDURE — 3288F FALL RISK ASSESSMENT DOCD: CPT | Mod: CPTII,S$GLB,, | Performed by: INTERNAL MEDICINE

## 2023-01-26 PROCEDURE — 3075F SYST BP GE 130 - 139MM HG: CPT | Mod: CPTII,S$GLB,, | Performed by: INTERNAL MEDICINE

## 2023-01-26 PROCEDURE — 1126F AMNT PAIN NOTED NONE PRSNT: CPT | Mod: CPTII,S$GLB,, | Performed by: INTERNAL MEDICINE

## 2023-01-26 PROCEDURE — 3079F DIAST BP 80-89 MM HG: CPT | Mod: CPTII,S$GLB,, | Performed by: INTERNAL MEDICINE

## 2023-01-26 PROCEDURE — 3079F PR MOST RECENT DIASTOLIC BLOOD PRESSURE 80-89 MM HG: ICD-10-PCS | Mod: CPTII,S$GLB,, | Performed by: INTERNAL MEDICINE

## 2023-01-26 NOTE — ASSESSMENT & PLAN NOTE
Continue on fish oil capsules and Crestor 20 mg daily low-fat diet\   Latest Reference Range & Units 08/05/22 07:28   Cholesterol <200 mg/dL 129   HDL > OR = 40 mg/dL 49   HDL/Cholesterol Ratio <5.0 (calc) 2.6   LDL Cholesterol External mg/dL (calc) 59   Non HDL Chol. (LDL+VLDL) <130 mg/dL (calc) 80   Triglycerides <150 mg/dL 130

## 2023-01-26 NOTE — ASSESSMENT & PLAN NOTE
Blood pressure is stable at 130 4/82 continue on losartan 25 mg daily and atenolol 25 daily low-salt low-cholesterol

## 2023-01-26 NOTE — PROGRESS NOTES
Subjective:    Patient ID:  Jose Marti is a 77 y.o. male patient here for evaluation Follow-up      History of Present Illness:     Is a 77-year-old gentleman with history of known coronary artery disease seeking follow-up evaluation.  Seem to be doing very well denies having chest discomfort arm neck or jaw pain no significant palpitations are noted.  He he has been compliant with his medications blood pressure has been stable no headaches no double vision no swelling in her feet and no significant cardiac symptoms noted        Review of patient's allergies indicates:   Allergen Reactions    Metoprolol succinate Hives     Broke out in hives        History reviewed. No pertinent past medical history.  Past Surgical History:   Procedure Laterality Date    BACK SURGERY  10/2018    Dr Menon-[facility]  fusion L3-L5    CARDIAC SURGERY  2017    Dr MARK Marcial-[facility]- 2 vessel CABG    COLONOSCOPY  11/18/2013    Dr Ramos DUE 11/18/2018    GALLBLADDER SURGERY      TONSILLECTOMY       Social History     Tobacco Use    Smoking status: Never    Smokeless tobacco: Never   Substance Use Topics    Alcohol use: Yes    Drug use: Never        Review of Systems:    As noted in HPI in addition      REVIEW OF SYSTEMS  CARDIOVASCULAR: No recent chest pain, palpitations, arm, neck, or jaw pain  RESPIRATORY: No recent fever, cough chills, SOB or congestion  : No blood in the urine  GI: No Nausea, vomiting, constipation, diarrhea, blood, or reflux.  MUSCULOSKELETAL: No myalgias  NEURO: No lightheadedness or dizziness  EYES: No Double vision, blurry, vision or headache              Objective        Vitals:    01/26/23 1438   BP: 134/82   Pulse: 63   Resp: 16       LIPIDS - LAST 2   Lab Results   Component Value Date    CHOL 129 08/05/2022    CHOL 126 01/24/2022    HDL 49 08/05/2022    HDL 52 01/24/2022    LDLCALC 59 08/05/2022    LDLCALC 52 01/24/2022    TRIG 130 08/05/2022    TRIG 135 01/24/2022    CHOLHDL 2.6 08/05/2022    CHOLHDL 2.4  01/24/2022       CBC - LAST 2  Lab Results   Component Value Date    WBC 2.6 (L) 08/05/2022    WBC 2.9 (L) 01/24/2022    RBC 4.62 08/05/2022    RBC 4.76 01/24/2022    HGB 13.9 08/05/2022    HGB 14.4 01/24/2022    HCT 42.6 08/05/2022    HCT 42.6 01/24/2022    MCV 92.2 08/05/2022    MCV 89.5 01/24/2022    MCH 30.1 08/05/2022    MCH 30.3 01/24/2022    MCHC 32.6 08/05/2022    MCHC 33.8 01/24/2022    RDW 12.9 08/05/2022    RDW 13.0 01/24/2022     08/05/2022     01/24/2022    MPV 10.3 08/05/2022    MPV 10.8 01/24/2022    GRAN 1.74 (L) 05/15/2009    GRAN 53.7 05/15/2009    LYMPH 897 08/05/2022    LYMPH 34.5 08/05/2022    MONO 247 08/05/2022    MONO 9.5 08/05/2022    BASO 10 08/05/2022    BASO 0 01/24/2022       CHEMISTRY & LIVER FUNCTION - LAST 2  Lab Results   Component Value Date     08/05/2022     01/24/2022    K 4.8 08/05/2022    K 4.6 01/24/2022     08/05/2022     01/24/2022    CO2 29 08/05/2022    CO2 30 01/24/2022    BUN 14 08/05/2022    BUN 15 01/24/2022    CREATININE 0.92 08/05/2022    CREATININE 0.90 01/24/2022    GLU 95 08/05/2022     (H) 01/24/2022    CALCIUM 9.4 08/05/2022    CALCIUM 9.2 01/24/2022    ALBUMIN 4.2 08/05/2022    ALBUMIN 4.3 01/24/2022    PROT 6.2 08/05/2022    PROT 6.6 01/24/2022    ALKPHOS 65 01/24/2020    ALKPHOS 72 05/15/2009    ALT 25 08/05/2022    ALT 20 01/24/2022    AST 22 08/05/2022    AST 22 01/24/2022    BILITOT 1.8 (H) 08/05/2022    BILITOT 1.8 (H) 01/24/2022        CARDIAC PROFILE - LAST 2  No results found for: BNP, CPK, CPKMB, LDH, TROPONINI, TROPONINIHS     COAGULATION - LAST 2  No results found for: LABPT, INR, APTT    ENDOCRINE & PSA - LAST 2  Lab Results   Component Value Date    MICROALBUR 1.0 01/24/2022    TSH 1.73 01/24/2022    TSH 1.15 01/24/2020    PSA 0.70 05/15/2009        ECHOCARDIOGRAM RESULTS  No results found for this or any previous visit.      CURRENT/PREVIOUS VISIT EKG  Results for orders placed or performed in visit on  06/14/22   IN OFFICE EKG 12-LEAD (to [place])    Collection Time: 06/14/22  2:37 PM    Narrative    Test Reason : E78.2,    Vent. Rate : 060 BPM     Atrial Rate : 060 BPM     P-R Int : 164 ms          QRS Dur : 102 ms      QT Int : 412 ms       P-R-T Axes : 076 013 035 degrees     QTc Int : 412 ms    Normal sinus rhythm  Normal ECG  When compared with ECG of 05-MAY-2021 10:07,  No significant change was found  Confirmed by Corwin Dominguez MD ([id number]) on 6/26/2022 6:41:02 PM    Referred By:             Confirmed By:Corwin Dominguez MD     No valid procedures specified.   No results found for this or any previous visit.    No valid procedures specified.    PHYSICAL EXAM  CONSTITUTIONAL: Well built, well nourished in no apparent distress  NECK: no carotid bruit, no JVD  LUNGS:  Fairly good air exchange no wheezes are noted   CHEST WALL: no tenderness  HEART: regular rate and rhythm, S1, S2 normal, no murmur, click, rub or gallop   ABDOMEN: soft, non-tender; bowel sounds normal; no masses,  no organomegaly  EXTREMITIES: Extremities normal, no edema, no calf tenderness noted  NEURO: AAO X 3    I HAVE REVIEWED :    The vital signs, nurses notes, and all the pertinent radiology and labs.        Current Outpatient Medications   Medication Instructions    aspirin (ECOTRIN) 81 mg, Oral, Daily    atenoloL (TENORMIN) 25 MG tablet TAKE 1 TABLET DAILY    calcium carbonate-vitamin D3 600mg (1,000mg) -1,000 unit Tab Oral, Once    cinnamon bark 1,000 mg, Oral, Daily    coenzyme Q10 200 mg, Oral, Daily    cyclobenzaprine (FLEXERIL) 10 MG tablet prn    glucosamine-D3-Boswellia serr 1,500-400-100 mg-unit-mg Tab Oral    losartan (COZAAR) 25 MG tablet TAKE 1 TABLET DAILY    magnesium gluconate 27.5 mg magne- sium (500 mg) Tab Oral, Once    meloxicam (MOBIC) 15 mg, Oral, Daily    metroNIDAZOLE (METROGEL) 0.75 % gel Topical (Top), 2 times daily    multivitamin (THERAGRAN) per tablet 1 tablet, Oral, Daily    omega 3-dha-epa-fish oil 1,000 mg  (120 mg-180 mg) Cap No dose, route, or frequency recorded.    omeprazole (PRILOSEC) 20 mg, Oral, Daily    rosuvastatin (CRESTOR) 20 MG tablet TAKE 1 TABLET NIGHTLY    tamsulosin (FLOMAX) 0.4 mg, Oral, Daily    triamcinolone acetonide 0.025% (KENALOG) 0.025 % cream Topical (Top), 2 times daily          Assessment & Plan     Atherosclerotic heart disease of native coronary artery with other forms of angina pectoris  Continue on present therapy to include atenolol 25 mg a day, losartan 25 mg daily and also on Crestor 20 mg daily for risk factor modification along with enteric-coated aspirin 81 mg a day.    Essential (primary) hypertension  Blood pressure is stable at 130 4/82 continue on losartan 25 mg daily and atenolol 25 daily low-salt low-cholesterol    History of coronary artery bypass graft x 2  No active anginal symptoms are noted continue on risk factor modification mentioned above along with Crestor omega-3 fish oil capsules    Mitral valve insufficiency  Nonrheumatic mitral valve insufficiency.  I have encouraged him to continue on adequate afterload reducing agents with losartan and appears to be relatively stable    Mixed hyperlipidemia  Continue on fish oil capsules and Crestor 20 mg daily low-fat diet\   Latest Reference Range & Units 08/05/22 07:28   Cholesterol <200 mg/dL 129   HDL > OR = 40 mg/dL 49   HDL/Cholesterol Ratio <5.0 (calc) 2.6   LDL Cholesterol External mg/dL (calc) 59   Non HDL Chol. (LDL+VLDL) <130 mg/dL (calc) 80   Triglycerides <150 mg/dL 130         Gastro-esophageal reflux disease without esophagitis  Dyspeptic symptoms are also stable continue on Prilosec 20 mg daily.    Leg cramps  These are improved continue on magnesium gluconate daily.  He is also using Flexeril p.r.n. basis          Follow up in about 6 months (around 7/26/2023).

## 2023-01-26 NOTE — ASSESSMENT & PLAN NOTE
No active anginal symptoms are noted continue on risk factor modification mentioned above along with Crestor omega-3 fish oil capsules

## 2023-01-26 NOTE — ASSESSMENT & PLAN NOTE
Nonrheumatic mitral valve insufficiency.  I have encouraged him to continue on adequate afterload reducing agents with losartan and appears to be relatively stable

## 2023-01-26 NOTE — ASSESSMENT & PLAN NOTE
Continue on present therapy to include atenolol 25 mg a day, losartan 25 mg daily and also on Crestor 20 mg daily for risk factor modification along with enteric-coated aspirin 81 mg a day.

## 2023-01-28 ENCOUNTER — PATIENT MESSAGE (OUTPATIENT)
Dept: FAMILY MEDICINE | Facility: CLINIC | Age: 78
End: 2023-01-28

## 2023-01-28 DIAGNOSIS — N40.0 ENLARGED PROSTATE WITHOUT LOWER URINARY TRACT SYMPTOMS (LUTS): ICD-10-CM

## 2023-01-30 RX ORDER — TAMSULOSIN HYDROCHLORIDE 0.4 MG/1
0.4 CAPSULE ORAL DAILY
Qty: 90 CAPSULE | Refills: 2 | Status: SHIPPED | OUTPATIENT
Start: 2023-01-30 | End: 2023-10-30 | Stop reason: SDUPTHER

## 2023-02-14 ENCOUNTER — PATIENT MESSAGE (OUTPATIENT)
Dept: CARDIOLOGY | Facility: CLINIC | Age: 78
End: 2023-02-14
Payer: MEDICARE

## 2023-02-15 NOTE — TELEPHONE ENCOUNTER
Elham had called [employer], she messaged the patient in a separate encounter. The approval has been scanned to media

## 2023-03-07 ENCOUNTER — PATIENT MESSAGE (OUTPATIENT)
Dept: CARDIOLOGY | Facility: CLINIC | Age: 78
End: 2023-03-07
Payer: MEDICARE

## 2023-03-07 RX ORDER — LOSARTAN POTASSIUM 25 MG/1
25 TABLET ORAL DAILY
Qty: 90 TABLET | Refills: 3 | Status: SHIPPED | OUTPATIENT
Start: 2023-03-07 | End: 2024-02-19 | Stop reason: SDUPTHER

## 2023-05-25 RX ORDER — ATENOLOL 25 MG/1
TABLET ORAL
Qty: 90 TABLET | Refills: 3 | Status: SHIPPED | OUTPATIENT
Start: 2023-05-25

## 2023-07-24 ENCOUNTER — OFFICE VISIT (OUTPATIENT)
Dept: CARDIOLOGY | Facility: CLINIC | Age: 78
End: 2023-07-24
Payer: MEDICARE

## 2023-07-24 VITALS
RESPIRATION RATE: 16 BRPM | HEIGHT: 73 IN | DIASTOLIC BLOOD PRESSURE: 64 MMHG | OXYGEN SATURATION: 97 % | BODY MASS INDEX: 28.76 KG/M2 | WEIGHT: 217 LBS | HEART RATE: 49 BPM | SYSTOLIC BLOOD PRESSURE: 122 MMHG

## 2023-07-24 DIAGNOSIS — I25.118 ATHEROSCLEROTIC HEART DISEASE OF NATIVE CORONARY ARTERY WITH OTHER FORMS OF ANGINA PECTORIS: ICD-10-CM

## 2023-07-24 DIAGNOSIS — I34.0 NONRHEUMATIC MITRAL VALVE REGURGITATION: ICD-10-CM

## 2023-07-24 DIAGNOSIS — I10 ESSENTIAL (PRIMARY) HYPERTENSION: ICD-10-CM

## 2023-07-24 DIAGNOSIS — E78.2 MIXED HYPERLIPIDEMIA: ICD-10-CM

## 2023-07-24 DIAGNOSIS — K21.9 GASTRO-ESOPHAGEAL REFLUX DISEASE WITHOUT ESOPHAGITIS: ICD-10-CM

## 2023-07-24 DIAGNOSIS — R25.2 LEG CRAMPS: ICD-10-CM

## 2023-07-24 PROCEDURE — 3074F PR MOST RECENT SYSTOLIC BLOOD PRESSURE < 130 MM HG: ICD-10-PCS | Mod: CPTII,S$GLB,, | Performed by: INTERNAL MEDICINE

## 2023-07-24 PROCEDURE — 3078F PR MOST RECENT DIASTOLIC BLOOD PRESSURE < 80 MM HG: ICD-10-PCS | Mod: CPTII,S$GLB,, | Performed by: INTERNAL MEDICINE

## 2023-07-24 PROCEDURE — 99999 PR PBB SHADOW E&M-EST. PATIENT-LVL IV: CPT | Mod: PBBFAC,,, | Performed by: INTERNAL MEDICINE

## 2023-07-24 PROCEDURE — 99213 PR OFFICE/OUTPT VISIT, EST, LEVL III, 20-29 MIN: ICD-10-PCS | Mod: S$GLB,,, | Performed by: INTERNAL MEDICINE

## 2023-07-24 PROCEDURE — 1126F PR PAIN SEVERITY QUANTIFIED, NO PAIN PRESENT: ICD-10-PCS | Mod: CPTII,S$GLB,, | Performed by: INTERNAL MEDICINE

## 2023-07-24 PROCEDURE — 99999 PR PBB SHADOW E&M-EST. PATIENT-LVL IV: ICD-10-PCS | Mod: PBBFAC,,, | Performed by: INTERNAL MEDICINE

## 2023-07-24 PROCEDURE — 1159F MED LIST DOCD IN RCRD: CPT | Mod: CPTII,S$GLB,, | Performed by: INTERNAL MEDICINE

## 2023-07-24 PROCEDURE — 1126F AMNT PAIN NOTED NONE PRSNT: CPT | Mod: CPTII,S$GLB,, | Performed by: INTERNAL MEDICINE

## 2023-07-24 PROCEDURE — 3288F FALL RISK ASSESSMENT DOCD: CPT | Mod: CPTII,S$GLB,, | Performed by: INTERNAL MEDICINE

## 2023-07-24 PROCEDURE — 3074F SYST BP LT 130 MM HG: CPT | Mod: CPTII,S$GLB,, | Performed by: INTERNAL MEDICINE

## 2023-07-24 PROCEDURE — 3078F DIAST BP <80 MM HG: CPT | Mod: CPTII,S$GLB,, | Performed by: INTERNAL MEDICINE

## 2023-07-24 PROCEDURE — 1160F PR REVIEW ALL MEDS BY PRESCRIBER/CLIN PHARMACIST DOCUMENTED: ICD-10-PCS | Mod: CPTII,S$GLB,, | Performed by: INTERNAL MEDICINE

## 2023-07-24 PROCEDURE — 3288F PR FALLS RISK ASSESSMENT DOCUMENTED: ICD-10-PCS | Mod: CPTII,S$GLB,, | Performed by: INTERNAL MEDICINE

## 2023-07-24 PROCEDURE — 99213 OFFICE O/P EST LOW 20 MIN: CPT | Mod: S$GLB,,, | Performed by: INTERNAL MEDICINE

## 2023-07-24 PROCEDURE — 1101F PT FALLS ASSESS-DOCD LE1/YR: CPT | Mod: CPTII,S$GLB,, | Performed by: INTERNAL MEDICINE

## 2023-07-24 PROCEDURE — 1101F PR PT FALLS ASSESS DOC 0-1 FALLS W/OUT INJ PAST YR: ICD-10-PCS | Mod: CPTII,S$GLB,, | Performed by: INTERNAL MEDICINE

## 2023-07-24 PROCEDURE — 1160F RVW MEDS BY RX/DR IN RCRD: CPT | Mod: CPTII,S$GLB,, | Performed by: INTERNAL MEDICINE

## 2023-07-24 PROCEDURE — 1159F PR MEDICATION LIST DOCUMENTED IN MEDICAL RECORD: ICD-10-PCS | Mod: CPTII,S$GLB,, | Performed by: INTERNAL MEDICINE

## 2023-07-24 NOTE — ASSESSMENT & PLAN NOTE
Continue on risk factor modification maintain on aspirin, atenolol 25, Crestor 20 mg daily the goal is to keep his LDL cholesterol well controlled ideally below 60.  Continue on blood pressure control as well.  He is presently stable

## 2023-07-24 NOTE — ASSESSMENT & PLAN NOTE
Maintain low-fat low-cholesterol diet Crestor 20 mg daily  His last lipid levels are controlled   Latest Reference Range & Units Most Recent   Cholesterol <200 mg/dL 129  8/5/22 07:28   HDL > OR = 40 mg/dL 49  8/5/22 07:28   HDL/Cholesterol Ratio <5.0 (calc) 2.6  8/5/22 07:28   LDL Cholesterol External mg/dL (calc) 59  8/5/22 07:28   Non HDL Chol. (LDL+VLDL) <130 mg/dL (calc) 80  8/5/22 07:28   Total Cholesterol/HDL Ratio 2.0 - 5.0  3.7  5/15/09 07:46   Triglycerides <150 mg/dL 130  8/5/22 07:28

## 2023-07-24 NOTE — PROGRESS NOTES
Subjective:    Patient ID:  Jose Marti is a 78 y.o. male patient here for evaluation Follow-up      History of Present Illness:   Patient is a 78-year-old gentleman seeking follow-up evaluation for coronary artery disease has been doing very well with no new symptoms angina shortness of breath palpitations dizziness lightheadedness noted.  He remains fairly active and during these times no new cardiac symptoms are noted.    No cough or congestion no fevers chills no nausea vomiting no blood in the stools no black stools        Review of patient's allergies indicates:   Allergen Reactions    Metoprolol succinate Hives     Broke out in hives        History reviewed. No pertinent past medical history.  Past Surgical History:   Procedure Laterality Date    BACK SURGERY  10/2018    Dr Menon-[facility]  fusion L3-L5    CARDIAC SURGERY  2017    Dr MARK Marcial-[facility]- 2 vessel CABG    COLONOSCOPY  11/18/2013    Dr Ramos DUE 11/18/2018    GALLBLADDER SURGERY      TONSILLECTOMY       Social History     Tobacco Use    Smoking status: Never    Smokeless tobacco: Never   Substance Use Topics    Alcohol use: Yes    Drug use: Never        Review of Systems:    As noted in HPI in addition      REVIEW OF SYSTEMS  CARDIOVASCULAR: No recent chest pain, palpitations, arm, neck, or jaw pain  RESPIRATORY: No recent fever, cough chills, SOB or congestion  : No blood in the urine  GI: No Nausea, vomiting, constipation, diarrhea, blood, or reflux.  MUSCULOSKELETAL: No myalgias  NEURO: No lightheadedness or dizziness  EYES: No Double vision, blurry, vision or headache              Objective        Vitals:    07/24/23 1030   BP: 122/64   Pulse: (!) 49   Resp: 16       LIPIDS - LAST 2   Lab Results   Component Value Date    CHOL 129 08/05/2022    CHOL 126 01/24/2022    HDL 49 08/05/2022    HDL 52 01/24/2022    LDLCALC 59 08/05/2022    LDLCALC 52 01/24/2022    TRIG 130 08/05/2022    TRIG 135 01/24/2022    CHOLHDL 2.6 08/05/2022    CHOLHDL 2.4  01/24/2022       CBC - LAST 2  Lab Results   Component Value Date    WBC 2.6 (L) 08/05/2022    WBC 2.9 (L) 01/24/2022    RBC 4.62 08/05/2022    RBC 4.76 01/24/2022    HGB 13.9 08/05/2022    HGB 14.4 01/24/2022    HCT 42.6 08/05/2022    HCT 42.6 01/24/2022    MCV 92.2 08/05/2022    MCV 89.5 01/24/2022    MCH 30.1 08/05/2022    MCH 30.3 01/24/2022    MCHC 32.6 08/05/2022    MCHC 33.8 01/24/2022    RDW 12.9 08/05/2022    RDW 13.0 01/24/2022     08/05/2022     01/24/2022    MPV 10.3 08/05/2022    MPV 10.8 01/24/2022    GRAN 1.74 (L) 05/15/2009    GRAN 53.7 05/15/2009    LYMPH 897 08/05/2022    LYMPH 34.5 08/05/2022    MONO 247 08/05/2022    MONO 9.5 08/05/2022    BASO 10 08/05/2022    BASO 0 01/24/2022       CHEMISTRY & LIVER FUNCTION - LAST 2  Lab Results   Component Value Date     08/05/2022     01/24/2022    K 4.8 08/05/2022    K 4.6 01/24/2022     08/05/2022     01/24/2022    CO2 29 08/05/2022    CO2 30 01/24/2022    BUN 14 08/05/2022    BUN 15 01/24/2022    CREATININE 0.92 08/05/2022    CREATININE 0.90 01/24/2022    GLU 95 08/05/2022     (H) 01/24/2022    CALCIUM 9.4 08/05/2022    CALCIUM 9.2 01/24/2022    ALBUMIN 4.2 08/05/2022    ALBUMIN 4.3 01/24/2022    PROT 6.2 08/05/2022    PROT 6.6 01/24/2022    ALKPHOS 65 01/24/2020    ALKPHOS 72 05/15/2009    ALT 25 08/05/2022    ALT 20 01/24/2022    AST 22 08/05/2022    AST 22 01/24/2022    BILITOT 1.8 (H) 08/05/2022    BILITOT 1.8 (H) 01/24/2022        CARDIAC PROFILE - LAST 2  No results found for: BNP, CPK, CPKMB, LDH, TROPONINI, TROPONINIHS     COAGULATION - LAST 2  No results found for: LABPT, INR, APTT    ENDOCRINE & PSA - LAST 2  Lab Results   Component Value Date    MICROALBUR 1.0 01/24/2022    TSH 1.73 01/24/2022    TSH 1.15 01/24/2020    PSA 0.70 05/15/2009        ECHOCARDIOGRAM RESULTS  No results found for this or any previous visit.      CURRENT/PREVIOUS VISIT EKG  Results for orders placed or performed in visit on  06/14/22   IN OFFICE EKG 12-LEAD (to [place])    Collection Time: 06/14/22  2:37 PM    Narrative    Test Reason : E78.2,    Vent. Rate : 060 BPM     Atrial Rate : 060 BPM     P-R Int : 164 ms          QRS Dur : 102 ms      QT Int : 412 ms       P-R-T Axes : 076 013 035 degrees     QTc Int : 412 ms    Normal sinus rhythm  Normal ECG  When compared with ECG of 05-MAY-2021 10:07,  No significant change was found  Confirmed by Corwin Dominguez MD ([id number]) on 6/26/2022 6:41:02 PM    Referred By:             Confirmed By:Corwin Dominguez MD     No valid procedures specified.   No results found for this or any previous visit.    No valid procedures specified.    PHYSICAL EXAM  CONSTITUTIONAL: Well built, well nourished in no apparent distress  NECK: no carotid bruit, no JVD  LUNGS: CTA  CHEST WALL: no tenderness  HEART: regular rate and rhythm, S1, S2 normal, no murmur, click, rub or gallop   ABDOMEN: soft, non-tender; bowel sounds normal; no masses,  no organomegaly  EXTREMITIES: Extremities normal, no edema, no calf tenderness noted  NEURO: AAO X 3    I HAVE REVIEWED :    The vital signs, nurses notes, and all the pertinent radiology and labs.        Current Outpatient Medications   Medication Instructions    aspirin (ECOTRIN) 81 mg, Oral, Daily    atenoloL (TENORMIN) 25 MG tablet TAKE 1 TABLET DAILY    calcium carbonate-vitamin D3 600mg (1,000mg) -1,000 unit Tab Oral, Once    cinnamon bark 1,000 mg, Oral, Daily    coenzyme Q10 200 mg, Oral, Daily    cyclobenzaprine (FLEXERIL) 10 MG tablet prn    glucosamine-D3-Boswellia serr 1,500-400-100 mg-unit-mg Tab Oral    losartan (COZAAR) 25 mg, Oral, Daily    magnesium gluconate 27.5 mg magne- sium (500 mg) Tab Oral, Once    meloxicam (MOBIC) 15 mg, Oral, Daily    metroNIDAZOLE (METROGEL) 0.75 % gel Topical (Top), 2 times daily    multivitamin (THERAGRAN) per tablet 1 tablet, Oral, Daily    omega 3-dha-epa-fish oil 1,000 mg (120 mg-180 mg) Cap No dose, route, or frequency recorded.     omeprazole (PRILOSEC) 20 mg, Oral, Daily    rosuvastatin (CRESTOR) 20 MG tablet TAKE 1 TABLET NIGHTLY    tamsulosin (FLOMAX) 0.4 mg, Oral, Daily    triamcinolone acetonide 0.025% (KENALOG) 0.025 % cream Topical (Top), 2 times daily          Assessment & Plan     Atherosclerotic heart disease of native coronary artery with other forms of angina pectoris  Continue on risk factor modification maintain on aspirin, atenolol 25, Crestor 20 mg daily the goal is to keep his LDL cholesterol well controlled ideally below 60.  Continue on blood pressure control as well.  He is presently stable    Mixed hyperlipidemia  Maintain low-fat low-cholesterol diet Crestor 20 mg daily  His last lipid levels are controlled   Latest Reference Range & Units Most Recent   Cholesterol <200 mg/dL 129  8/5/22 07:28   HDL > OR = 40 mg/dL 49  8/5/22 07:28   HDL/Cholesterol Ratio <5.0 (calc) 2.6  8/5/22 07:28   LDL Cholesterol External mg/dL (calc) 59  8/5/22 07:28   Non HDL Chol. (LDL+VLDL) <130 mg/dL (calc) 80  8/5/22 07:28   Total Cholesterol/HDL Ratio 2.0 - 5.0  3.7  5/15/09 07:46   Triglycerides <150 mg/dL 130  8/5/22 07:28         Essential (primary) hypertension  Blood pressure has been stable maintain on atenolol 25 mg daily, losartan 25 mg a day maintain low-fat low-salt diet.    Gastro-esophageal reflux disease without esophagitis  On omeprazole 20 mg daily dyspeptic symptoms are controlled.    Leg cramps  Recommend to change the magnesium to magnesium oxide 400 mg daily for better absorption.    Mitral valve insufficiency  Stable are.  Will with continue on adequate afterload reduction with losartan low doses and beta-blocker therapy with atenolol.          No follow-ups on file.

## 2023-07-24 NOTE — ASSESSMENT & PLAN NOTE
Stable are.  Will with continue on adequate afterload reduction with losartan low doses and beta-blocker therapy with atenolol.

## 2023-07-24 NOTE — ASSESSMENT & PLAN NOTE
Blood pressure has been stable maintain on atenolol 25 mg daily, losartan 25 mg a day maintain low-fat low-salt diet.   Private car

## 2023-08-07 ENCOUNTER — OFFICE VISIT (OUTPATIENT)
Dept: FAMILY MEDICINE | Facility: CLINIC | Age: 78
End: 2023-08-07
Payer: MEDICARE

## 2023-08-07 VITALS
DIASTOLIC BLOOD PRESSURE: 60 MMHG | HEIGHT: 71 IN | HEART RATE: 66 BPM | SYSTOLIC BLOOD PRESSURE: 104 MMHG | BODY MASS INDEX: 29.96 KG/M2 | WEIGHT: 214 LBS

## 2023-08-07 DIAGNOSIS — Z95.1 HISTORY OF CORONARY ARTERY BYPASS GRAFT X 2: ICD-10-CM

## 2023-08-07 DIAGNOSIS — M47.9 SPONDYLOSIS: ICD-10-CM

## 2023-08-07 DIAGNOSIS — Z23 NEED FOR PROPHYLACTIC VACCINATION AGAINST STREPTOCOCCUS PNEUMONIAE (PNEUMOCOCCUS): ICD-10-CM

## 2023-08-07 DIAGNOSIS — I10 ESSENTIAL (PRIMARY) HYPERTENSION: ICD-10-CM

## 2023-08-07 DIAGNOSIS — K21.9 GASTRO-ESOPHAGEAL REFLUX DISEASE WITHOUT ESOPHAGITIS: ICD-10-CM

## 2023-08-07 DIAGNOSIS — Z12.5 SPECIAL SCREENING FOR MALIGNANT NEOPLASM OF PROSTATE: ICD-10-CM

## 2023-08-07 DIAGNOSIS — M75.42 IMPINGEMENT SYNDROME OF LEFT SHOULDER: ICD-10-CM

## 2023-08-07 DIAGNOSIS — I34.0 NONRHEUMATIC MITRAL VALVE REGURGITATION: ICD-10-CM

## 2023-08-07 DIAGNOSIS — E78.2 MIXED HYPERLIPIDEMIA: ICD-10-CM

## 2023-08-07 DIAGNOSIS — N40.0 ENLARGED PROSTATE WITHOUT LOWER URINARY TRACT SYMPTOMS (LUTS): ICD-10-CM

## 2023-08-07 DIAGNOSIS — M17.0 PRIMARY OSTEOARTHRITIS OF BOTH KNEES: ICD-10-CM

## 2023-08-07 DIAGNOSIS — I25.118 ATHEROSCLEROTIC HEART DISEASE OF NATIVE CORONARY ARTERY WITH OTHER FORMS OF ANGINA PECTORIS: ICD-10-CM

## 2023-08-07 DIAGNOSIS — I27.20 PULMONARY HTN: ICD-10-CM

## 2023-08-07 DIAGNOSIS — M51.16 LUMBAR DISC DISEASE WITH RADICULOPATHY: Primary | ICD-10-CM

## 2023-08-07 PROCEDURE — 99214 OFFICE O/P EST MOD 30 MIN: CPT | Mod: S$GLB,,, | Performed by: FAMILY MEDICINE

## 2023-08-07 PROCEDURE — 1101F PR PT FALLS ASSESS DOC 0-1 FALLS W/OUT INJ PAST YR: ICD-10-PCS | Mod: CPTII,S$GLB,, | Performed by: FAMILY MEDICINE

## 2023-08-07 PROCEDURE — 3078F PR MOST RECENT DIASTOLIC BLOOD PRESSURE < 80 MM HG: ICD-10-PCS | Mod: CPTII,S$GLB,, | Performed by: FAMILY MEDICINE

## 2023-08-07 PROCEDURE — 3288F FALL RISK ASSESSMENT DOCD: CPT | Mod: CPTII,S$GLB,, | Performed by: FAMILY MEDICINE

## 2023-08-07 PROCEDURE — 1159F MED LIST DOCD IN RCRD: CPT | Mod: CPTII,S$GLB,, | Performed by: FAMILY MEDICINE

## 2023-08-07 PROCEDURE — 3074F PR MOST RECENT SYSTOLIC BLOOD PRESSURE < 130 MM HG: ICD-10-PCS | Mod: CPTII,S$GLB,, | Performed by: FAMILY MEDICINE

## 2023-08-07 PROCEDURE — 1101F PT FALLS ASSESS-DOCD LE1/YR: CPT | Mod: CPTII,S$GLB,, | Performed by: FAMILY MEDICINE

## 2023-08-07 PROCEDURE — 3078F DIAST BP <80 MM HG: CPT | Mod: CPTII,S$GLB,, | Performed by: FAMILY MEDICINE

## 2023-08-07 PROCEDURE — 3074F SYST BP LT 130 MM HG: CPT | Mod: CPTII,S$GLB,, | Performed by: FAMILY MEDICINE

## 2023-08-07 PROCEDURE — 1159F PR MEDICATION LIST DOCUMENTED IN MEDICAL RECORD: ICD-10-PCS | Mod: CPTII,S$GLB,, | Performed by: FAMILY MEDICINE

## 2023-08-07 PROCEDURE — 99214 PR OFFICE/OUTPT VISIT, EST, LEVL IV, 30-39 MIN: ICD-10-PCS | Mod: S$GLB,,, | Performed by: FAMILY MEDICINE

## 2023-08-07 PROCEDURE — 3288F PR FALLS RISK ASSESSMENT DOCUMENTED: ICD-10-PCS | Mod: CPTII,S$GLB,, | Performed by: FAMILY MEDICINE

## 2023-08-07 RX ORDER — GABAPENTIN 300 MG/1
300 CAPSULE ORAL 2 TIMES DAILY
Qty: 60 CAPSULE | Refills: 5
Start: 2023-08-07 | End: 2024-08-06

## 2023-08-07 NOTE — PROGRESS NOTES
SUBJECTIVE:    Patient ID: Jose Marti is a 78 y.o. male.    Chief Complaint: Low-back Pain (Right leg pain, brought medications list, pt had pna vaccines, abc )    78-year-old male here for six-month checkup.  He stays active by mowing the lawn and weed eating.  No chest pain or angina with activity.    Lumbar disc disease, has a herniated disc that gives him pain and right-sided sciatica to the right anterior thigh.  Pain ranges 0-8/10 depending on the day.  He does take gabapentin 200 mg b.i.d. Dr. Nicolas Menon 2018 did lumbar spinal fusion.  Currently has meloxicam 15 mg daily and occasionally supplements with Aleve.  No narcotics at this time.  He would like to avoid another back surgery.    CABG-followed by Dr. Dominguez, cardiac-rodriguez doing stable.    2020-colonoscopy with Dr. Herrera-no RTC necessary    Bilateral arthritis of the knees, cortisone injections bilaterally by Dr. Armijo orthopedics has helped.    Up-to-date on his immunizations.        No visits with results within 6 Month(s) from this visit.   Latest known visit with results is:   Hospital Outpatient Visit on 09/15/2022   Component Date Value Ref Range Status    POC Creatinine 09/15/2022 1.0  0.5 - 1.4 mg/dL Final    Sample 09/15/2022 VENOUS   Final       No past medical history on file.  Social History     Socioeconomic History    Marital status:    Tobacco Use    Smoking status: Never    Smokeless tobacco: Never   Substance and Sexual Activity    Alcohol use: Yes    Drug use: Never    Sexual activity: Yes     Partners: Female     Social Determinants of Health     Financial Resource Strain: Low Risk  (7/31/2023)    Overall Financial Resource Strain (CARDIA)     Difficulty of Paying Living Expenses: Not hard at all   Food Insecurity: No Food Insecurity (7/31/2023)    Hunger Vital Sign     Worried About Running Out of Food in the Last Year: Never true     Ran Out of Food in the Last Year: Never true   Transportation Needs: No  Transportation Needs (7/31/2023)    PRAPARE - Transportation     Lack of Transportation (Medical): No     Lack of Transportation (Non-Medical): No   Physical Activity: Sufficiently Active (7/31/2023)    Exercise Vital Sign     Days of Exercise per Week: 2 days     Minutes of Exercise per Session: 120 min   Recent Concern: Physical Activity - Insufficiently Active (7/17/2023)    Exercise Vital Sign     Days of Exercise per Week: 2 days     Minutes of Exercise per Session: 60 min   Stress: No Stress Concern Present (7/31/2023)    [place] [employer] - Occupational Stress Questionnaire     Feeling of Stress : Not at all   Social Connections: Unknown (7/31/2023)    Social Connection and Isolation Panel [NHANES]     Frequency of Communication with Friends and Family: More than three times a week     Frequency of Social Gatherings with Friends and Family: Once a week     Active Member of Clubs or Organizations: Yes     Attends Club or Organization Meetings: More than 4 times per year     Marital Status:    Housing Stability: Low Risk  (7/31/2023)    Housing Stability Vital Sign     Unable to Pay for Housing in the Last Year: No     Number of Places Lived in the Last Year: 1     Unstable Housing in the Last Year: No     Past Surgical History:   Procedure Laterality Date    BACK SURGERY  10/2018    Dr Menon-Lumbar  fusion L3-L5    CARDIAC SURGERY  2017    Dr MARK [facility]- 2 vessel CABG    COLONOSCOPY  11/18/2013    Dr Ramos DUE 11/18/2018    GALLBLADDER SURGERY      TONSILLECTOMY       No family history on file.    The CVD Risk score (D'Agostino et al., 2008) failed to calculate for the following reasons:    The 2008 CVD risk score is only valid for ages 30 to 74    All of your core healthy metrics are met.      Review of patient's allergies indicates:   Allergen Reactions    Metoprolol succinate Hives     Broke out in hives        Current Outpatient Medications:     atenoloL (TENORMIN) 25 MG  tablet, TAKE 1 TABLET DAILY, Disp: 90 tablet, Rfl: 3    calcium carbonate-vitamin D3 600mg (1,000mg) -1,000 unit Tab, Take by mouth once., Disp: , Rfl:     cinnamon bark 500 mg capsule, Take 1,000 mg by mouth once daily., Disp: , Rfl:     coenzyme Q10 200 mg capsule, Take 200 mg by mouth once daily., Disp: , Rfl:     cyclobenzaprine (FLEXERIL) 10 MG tablet, prn, Disp: , Rfl:     glucosamine-D3-Boswellia serr 1,500-400-100 mg-unit-mg Tab, Take by mouth., Disp: , Rfl:     losartan (COZAAR) 25 MG tablet, Take 1 tablet (25 mg total) by mouth once daily., Disp: 90 tablet, Rfl: 3    magnesium gluconate 27.5 mg magne- sium (500 mg) Tab, Take by mouth once., Disp: , Rfl:     meloxicam (MOBIC) 15 MG tablet, Take 15 mg by mouth once daily., Disp: , Rfl:     metroNIDAZOLE (METROGEL) 0.75 % gel, Apply topically 2 (two) times daily., Disp: , Rfl:     multivitamin (THERAGRAN) per tablet, Take 1 tablet by mouth once daily., Disp: , Rfl:     omega 3-dha-epa-fish oil 1,000 mg (120 mg-180 mg) Cap, , Disp: , Rfl:     omeprazole (PRILOSEC) 20 MG capsule, Take 1 capsule (20 mg total) by mouth once daily., Disp: 90 capsule, Rfl: 3    rosuvastatin (CRESTOR) 20 MG tablet, TAKE 1 TABLET NIGHTLY, Disp: 90 tablet, Rfl: 3    tamsulosin (FLOMAX) 0.4 mg Cap, Take 1 capsule (0.4 mg total) by mouth once daily., Disp: 90 capsule, Rfl: 2    triamcinolone acetonide 0.025% (KENALOG) 0.025 % cream, Apply topically 2 (two) times daily., Disp: 30 g, Rfl: 2    aspirin (ECOTRIN) 81 MG EC tablet, Take 81 mg by mouth once daily., Disp: , Rfl:     gabapentin (NEURONTIN) 300 MG capsule, Take 1 capsule (300 mg total) by mouth 2 (two) times daily., Disp: 60 capsule, Rfl: 5    Review of Systems   Constitutional:  Negative for appetite change, chills, fatigue, fever and unexpected weight change.   HENT:  Negative for ear pain and trouble swallowing.    Eyes:  Negative for pain, discharge and visual disturbance.   Respiratory:  Negative for apnea, cough, shortness  "of breath and wheezing.    Cardiovascular:  Negative for chest pain and leg swelling.   Gastrointestinal:  Positive for reflux (on omeprazole  daily). Negative for abdominal pain, blood in stool, constipation, diarrhea, nausea and vomiting.   Endocrine: Negative for cold intolerance, heat intolerance and polydipsia.   Genitourinary:  Positive for bladder incontinence and urgency. Negative for dysuria, erectile dysfunction, frequency, hematuria and testicular pain.        Nocturia  2-3 x  night   Musculoskeletal:  Negative for gait problem, joint swelling and myalgias.   Neurological:  Negative for dizziness, seizures and numbness.   Psychiatric/Behavioral:  Negative for agitation, behavioral problems, hallucinations and sleep disturbance. The patient is not nervous/anxious.            Objective:      Vitals:    08/07/23 0855   BP: 104/60   Pulse: 66   Weight: 97.1 kg (214 lb)   Height: 5' 11" (1.803 m)     Physical Exam  Vitals and nursing note reviewed.   Constitutional:       General: He is not in acute distress.     Appearance: Normal appearance. He is well-developed. He is not toxic-appearing.   HENT:      Head: Normocephalic and atraumatic.      Right Ear: Tympanic membrane and external ear normal.      Left Ear: Tympanic membrane and external ear normal.      Nose: Nose normal.      Mouth/Throat:      Pharynx: Oropharynx is clear.   Eyes:      Pupils: Pupils are equal, round, and reactive to light.   Neck:      Thyroid: No thyromegaly.      Vascular: No carotid bruit.   Cardiovascular:      Rate and Rhythm: Normal rate and regular rhythm.      Heart sounds: Normal heart sounds. No murmur heard.  Pulmonary:      Effort: Pulmonary effort is normal.      Breath sounds: Normal breath sounds. No wheezing or rales.   Abdominal:      General: Bowel sounds are normal. There is no distension.      Palpations: Abdomen is soft.      Tenderness: There is no abdominal tenderness.   Musculoskeletal:         General: No " tenderness or deformity. Normal range of motion.      Cervical back: Normal range of motion and neck supple.      Lumbar back: Normal. No spasms.      Comments: Bends 60 degrees at  waist ,shoulders good range of motion, knees are crepitant bilaterally.  No pitting edema to lower extremities.   Lymphadenopathy:      Cervical: No cervical adenopathy.   Skin:     General: Skin is warm and dry.      Findings: No rash.   Neurological:      Mental Status: He is alert and oriented to person, place, and time. Mental status is at baseline.      Cranial Nerves: No cranial nerve deficit.      Coordination: Coordination normal.   Psychiatric:         Mood and Affect: Mood normal.         Behavior: Behavior normal.         Thought Content: Thought content normal.         Judgment: Judgment normal.           Assessment:       1. Lumbar disc disease with radiculopathy    2. Need for prophylactic vaccination against Streptococcus pneumoniae (pneumococcus)    3. Essential (primary) hypertension    4. Special screening for malignant neoplasm of prostate    5. Spondylosis    6. 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 Mixed hyperlipidemia    8. History of coronary artery bypass graft x 2    9. Pulmonary HTN    10. Nonrheumatic mitral valve regurgitation    11. Enlarged prostate without lower urinary tract symptoms (luts)    12. Gastro-esophageal reflux disease without esophagitis    13. Impingement syndrome of left shoulder    14. Primary osteoarthritis of both knees         Plan:       Lumbar disc disease with radiculopathy  Patient treating this conservatively, Dr. Nicolas Menon following continue meloxicam 15 mg daily.  Essential (primary) hypertension  -     CBC Auto Differential; Future; Expected date: 08/07/2023  -     Comprehensive Metabolic Panel; Future; Expected date: 08/07/2023  -     Lipid Panel; Future; Expected date: 08/07/2023  -     Urinalysis, Reflex to Urine  Culture Urine, Clean Catch; Future; Expected date: 08/07/2023  -     TSH w/reflex to FT4; Future; Expected date: 08/07/2023  -     PSA, Screening; Future; Expected date: 08/07/2023  Routine lab work ordered, blood pressure well controlled  Special screening for malignant neoplasm of prostate  -     PSA, Screening; Future; Expected date: 08/07/2023  Knees PSA  Spondylosis    Atherosclerotic heart disease of native coronary artery with other forms of angina pectoris    Mixed hyperlipidemia    History of coronary artery bypass graft x 2  Coronary wise is stable.  Pulmonary HTN    Nonrheumatic mitral valve regurgitation    Enlarged prostate without lower urinary tract symptoms (luts)  Continue Flomax  Gastro-esophageal reflux disease without esophagitis    Impingement syndrome of left shoulder    Primary osteoarthritis of both knees  Cortisone injections both knees with Dr. Armijo is helpful.  Other orders  -     gabapentin (NEURONTIN) 300 MG capsule; Take 1 capsule (300 mg total) by mouth 2 (two) times daily.  Dispense: 60 capsule; Refill: 5      Follow up in about 6 months (around 2/7/2024), or Lumbar disc disease.        8/7/2023 Abdon Schuler

## 2023-08-09 LAB
ALBUMIN SERPL-MCNC: 4.4 G/DL (ref 3.6–5.1)
ALBUMIN/GLOB SERPL: 2.1 (CALC) (ref 1–2.5)
ALP SERPL-CCNC: 56 U/L (ref 35–144)
ALT SERPL-CCNC: 17 U/L (ref 9–46)
APPEARANCE UR: CLEAR
AST SERPL-CCNC: 21 U/L (ref 10–35)
BACTERIA #/AREA URNS HPF: NORMAL /HPF
BACTERIA UR CULT: NORMAL
BASOPHILS # BLD AUTO: 9 CELLS/UL (ref 0–200)
BASOPHILS NFR BLD AUTO: 0.2 %
BILIRUB SERPL-MCNC: 1.8 MG/DL (ref 0.2–1.2)
BILIRUB UR QL STRIP: NEGATIVE
BUN SERPL-MCNC: 18 MG/DL (ref 7–25)
BUN/CREAT SERPL: ABNORMAL (CALC) (ref 6–22)
CALCIUM SERPL-MCNC: 9.5 MG/DL (ref 8.6–10.3)
CHLORIDE SERPL-SCNC: 106 MMOL/L (ref 98–110)
CHOLEST SERPL-MCNC: 128 MG/DL
CHOLEST/HDLC SERPL: 2.5 (CALC)
CO2 SERPL-SCNC: 28 MMOL/L (ref 20–32)
COLOR UR: NORMAL
CREAT SERPL-MCNC: 0.9 MG/DL (ref 0.7–1.28)
EGFR: 87 ML/MIN/1.73M2
EOSINOPHIL # BLD AUTO: 82 CELLS/UL (ref 15–500)
EOSINOPHIL NFR BLD AUTO: 1.9 %
ERYTHROCYTE [DISTWIDTH] IN BLOOD BY AUTOMATED COUNT: 12.8 % (ref 11–15)
GLOBULIN SER CALC-MCNC: 2.1 G/DL (CALC) (ref 1.9–3.7)
GLUCOSE SERPL-MCNC: 101 MG/DL (ref 65–99)
GLUCOSE UR QL STRIP: NEGATIVE
HCT VFR BLD AUTO: 43.4 % (ref 38.5–50)
HDLC SERPL-MCNC: 51 MG/DL
HGB BLD-MCNC: 14.5 G/DL (ref 13.2–17.1)
HGB UR QL STRIP: NEGATIVE
HYALINE CASTS #/AREA URNS LPF: NORMAL /LPF
KETONES UR QL STRIP: NEGATIVE
LDLC SERPL CALC-MCNC: 56 MG/DL (CALC)
LEUKOCYTE ESTERASE UR QL STRIP: NEGATIVE
LYMPHOCYTES # BLD AUTO: 1165 CELLS/UL (ref 850–3900)
LYMPHOCYTES NFR BLD AUTO: 27.1 %
MCH RBC QN AUTO: 30.6 PG (ref 27–33)
MCHC RBC AUTO-ENTMCNC: 33.4 G/DL (ref 32–36)
MCV RBC AUTO: 91.6 FL (ref 80–100)
MONOCYTES # BLD AUTO: 370 CELLS/UL (ref 200–950)
MONOCYTES NFR BLD AUTO: 8.6 %
NEUTROPHILS # BLD AUTO: 2675 CELLS/UL (ref 1500–7800)
NEUTROPHILS NFR BLD AUTO: 62.2 %
NITRITE UR QL STRIP: NEGATIVE
NONHDLC SERPL-MCNC: 77 MG/DL (CALC)
PH UR STRIP: 6.5 [PH] (ref 5–8)
PLATELET # BLD AUTO: 171 THOUSAND/UL (ref 140–400)
PMV BLD REES-ECKER: 11.4 FL (ref 7.5–12.5)
POTASSIUM SERPL-SCNC: 4.6 MMOL/L (ref 3.5–5.3)
PROT SERPL-MCNC: 6.5 G/DL (ref 6.1–8.1)
PROT UR QL STRIP: NEGATIVE
PSA SERPL-MCNC: 1.76 NG/ML
RBC # BLD AUTO: 4.74 MILLION/UL (ref 4.2–5.8)
RBC #/AREA URNS HPF: NORMAL /HPF
SERVICE CMNT-IMP: NORMAL
SODIUM SERPL-SCNC: 141 MMOL/L (ref 135–146)
SP GR UR STRIP: 1.01 (ref 1–1.03)
SQUAMOUS #/AREA URNS HPF: NORMAL /HPF
TRIGL SERPL-MCNC: 124 MG/DL
TSH SERPL-ACNC: 1.23 MIU/L (ref 0.4–4.5)
WBC # BLD AUTO: 4.3 THOUSAND/UL (ref 3.8–10.8)
WBC #/AREA URNS HPF: NORMAL /HPF

## 2023-08-12 NOTE — PROGRESS NOTES
Call patient.  His lab work looks very good.  Sugar kidneys liver and thyroid all normal.  Prostate normal with a PSA of 1.76 cholesterol excellent at 128.  CBC shows no anemia, continue current medications

## 2023-08-14 ENCOUNTER — TELEPHONE (OUTPATIENT)
Dept: FAMILY MEDICINE | Facility: CLINIC | Age: 78
End: 2023-08-14

## 2023-08-14 NOTE — TELEPHONE ENCOUNTER
----- Message from Abdon Schuler MD sent at 8/12/2023  6:36 PM CDT -----  Call patient.  His lab work looks very good.  Sugar kidneys liver and thyroid all normal.  Prostate normal with a PSA of 1.76 cholesterol excellent at 128.  CBC shows no anemia, continue current medications

## 2023-08-14 NOTE — TELEPHONE ENCOUNTER
Spoke with pt in regards to recent lab results. Verbalized per Dr. Schuler that pt's lab work looks very good. Sugar, kidneys, liver and thyroid all normal. Prostate normal with a PSA of 1.76 cholesterol excellent at 128. CBC shows no anemia, continue current medications. Pt acknowledged understanding.

## 2023-09-05 ENCOUNTER — PATIENT OUTREACH (OUTPATIENT)
Dept: ADMINISTRATIVE | Facility: HOSPITAL | Age: 78
End: 2023-09-05
Payer: MEDICARE

## 2023-09-05 NOTE — PROGRESS NOTES
Population Health Chart Review & Patient Outreach Details:     Reason for Outreach Encounter:     [x]  Non-Compliant Report   []  Payor Report ([employer], PHN, BCBS, MSSP, MCIP, UHC, etc.)   []  Pre-Visit Chart Review     Updates Requested / Reviewed:     [x]  Care Everywhere    []     []  External Sources ([facility], [facility], DIS, etc.)   []  Care Team Updated    Patient Outreach Method:    []  Telephone Outreach Completed   [] Successful   [] Left Voicemail   [] Unable to Contact (wrong number, no voicemail)  []  [facility] Outreach Sent  []  Letter Outreach Mailed  []  Fax Sent for External Records  []  External Records Upload    Health Maintenance Topics Addressed and Outreach Outcomes / Actions Taken:        []      Breast Cancer Screening []  Mammo Scheduled      []  External Records Requested     []  Added Reminder to Complete to Upcoming Primary Care Appt Notes     []  Patient Declined     []  Patient Will Call Back to Schedule     []  Patient Will Schedule with External Provider / Order Routed if Applicable             []       Cervical Cancer Screening []  Pap Scheduled      []  External Records Requested     []  Added Reminder to Complete to Upcoming Primary Care Appt Notes     []  Patient Declined     []  Patient Will Call Back to Schedule     []  Patient Will Schedule with External Provider               []          Colorectal Cancer Screening []  Colonoscopy Case Request or Referral Placed     []  External Records Requested     []  Added Reminder to Complete to Upcoming Primary Care Appt Notes     []  Patient Declined     []  Patient Will Call Back to Schedule     []  Patient Will Schedule with External Provider     []  Fit Kit Mailed (add the SmartPhrase under additional notes)     []  Reminded Patient to Complete Home Test             []      Diabetic Eye Exam []  Eye Camera Scheduled or Optometry Referral Placed     []  External Records Requested     []  Added Reminder to Complete to  Upcoming Primary Care Appt Notes     []  Patient Declined     []  Patient Will Call Back to Schedule     []  Patient Will Schedule with External Provider             []      Blood Pressure Control []  Primary Care Follow Up Visit Scheduled     []  Remote Blood Pressure Reading Captured     []  Added Reminder to Complete to Upcoming Primary Care Appt Notes     []  Patient Declined     []  Patient Will Call Back / Patient Will Send Portal Message with Reading     []  Patient Will Call Back to Schedule Provider Visit             []       HbA1c & Other Labs []  Lab Appt Scheduled for Due Labs     []  Primary Care Follow Up Visit Scheduled      []  Reminded Patient to Complete Home Test     []  Added Reminder to Complete to Upcoming Primary Care Appt Notes     []  Patient Declined     []  Patient Will Call Back to Schedule     []  Patient Will Schedule with External Provider / Order Routed if Applicable           []    Schedule Primary Care Appt []  Primary Care Appt Scheduled     []  Patient Declined     []  Patient Will Call Back to Schedule     []  Pt Established with External Provider & Updated Care Team             []      Medication Adherence []  Primary Care Appointment Scheduled     []  Added Reminder to Upcoming Primary Care Appt Notes     []  Patient Reminded to  Prescription     []  Patient Declined, Provider Notified if Needed     []  Sent Provider Message to Review and/or Add Exclusion to Problem List             []      Osteoporosis Screening []  DXA Appointment Scheduled     []  External Records Requested     []  Added Reminder to Complete to Upcoming Primary Care Appt Notes     []  Patient Declined     []  Patient Will Call Back to Schedule     []  Patient Will Schedule with External Provider / Order Routed if Applicable     Additional Care Coordinator Notes:    Non HM eye exam uploaded in media.     Further Action Needed If Patient Returns Outreach:

## 2023-09-19 RX ORDER — ROSUVASTATIN CALCIUM 20 MG/1
TABLET, COATED ORAL
Qty: 90 TABLET | Refills: 3 | Status: SHIPPED | OUTPATIENT
Start: 2023-09-19

## 2023-09-25 ENCOUNTER — CLINICAL SUPPORT (OUTPATIENT)
Dept: FAMILY MEDICINE | Facility: CLINIC | Age: 78
End: 2023-09-25
Payer: MEDICARE

## 2023-09-25 DIAGNOSIS — Z23 NEED FOR VACCINATION: Primary | ICD-10-CM

## 2023-09-25 PROCEDURE — 90694 VACC AIIV4 NO PRSRV 0.5ML IM: CPT | Mod: S$GLB,,, | Performed by: FAMILY MEDICINE

## 2023-09-25 PROCEDURE — G0008 ADMIN INFLUENZA VIRUS VAC: HCPCS | Mod: S$GLB,,, | Performed by: FAMILY MEDICINE

## 2023-09-25 PROCEDURE — 90694 FLU VACCINE - QUADRIVALENT - ADJUVANTED: ICD-10-PCS | Mod: S$GLB,,, | Performed by: FAMILY MEDICINE

## 2023-09-25 PROCEDURE — G0008 FLU VACCINE - QUADRIVALENT - ADJUVANTED: ICD-10-PCS | Mod: S$GLB,,, | Performed by: FAMILY MEDICINE

## 2023-10-16 ENCOUNTER — PATIENT MESSAGE (OUTPATIENT)
Dept: FAMILY MEDICINE | Facility: CLINIC | Age: 78
End: 2023-10-16

## 2023-10-30 ENCOUNTER — PATIENT MESSAGE (OUTPATIENT)
Dept: FAMILY MEDICINE | Facility: CLINIC | Age: 78
End: 2023-10-30

## 2023-10-30 DIAGNOSIS — N40.0 ENLARGED PROSTATE WITHOUT LOWER URINARY TRACT SYMPTOMS (LUTS): ICD-10-CM

## 2023-10-30 RX ORDER — TAMSULOSIN HYDROCHLORIDE 0.4 MG/1
0.4 CAPSULE ORAL DAILY
Qty: 90 CAPSULE | Refills: 3 | Status: SHIPPED | OUTPATIENT
Start: 2023-10-30

## 2023-11-21 ENCOUNTER — PATIENT MESSAGE (OUTPATIENT)
Dept: FAMILY MEDICINE | Facility: CLINIC | Age: 78
End: 2023-11-21
Payer: MEDICARE

## 2023-12-28 RX ORDER — NIRMATRELVIR AND RITONAVIR 300-100 MG
KIT ORAL
Qty: 30 TABLET | Refills: 0 | Status: SHIPPED | OUTPATIENT
Start: 2023-12-28 | End: 2024-02-19

## 2023-12-28 NOTE — TELEPHONE ENCOUNTER
"Per Dr. Schuler, "Okay Paxlovid."   Spoke with patient's wife and informed that Dr. Schuler will be sending in Paxlovid for patient by the end of the day today. Patient's wife verbalized understanding.   "

## 2023-12-28 NOTE — TELEPHONE ENCOUNTER
----- Message from Judit Yang sent at 12/28/2023 11:11 AM CST -----  Patient tested positive for covid.  Would like prescription he is having.  Body aches, sore throat  [facility]. Wife  Danielle's phone [phone].

## 2023-12-28 NOTE — TELEPHONE ENCOUNTER
Contacting wife regarding recent call and states patient tested positive today with at home COVID test.   Lethargic, watery eyes, sore throat and body aches. No fever. In bed resting and asleep.   Took tylenol and did not help with body aches.     [facility]   5 COVID Risk score    Patient is not taking any blood thinners.

## 2024-01-09 ENCOUNTER — OFFICE VISIT (OUTPATIENT)
Dept: CARDIOLOGY | Facility: CLINIC | Age: 79
End: 2024-01-09
Payer: MEDICARE

## 2024-01-09 VITALS
HEIGHT: 71 IN | HEART RATE: 80 BPM | BODY MASS INDEX: 30.66 KG/M2 | WEIGHT: 219 LBS | RESPIRATION RATE: 16 BRPM | SYSTOLIC BLOOD PRESSURE: 136 MMHG | OXYGEN SATURATION: 97 % | DIASTOLIC BLOOD PRESSURE: 80 MMHG

## 2024-01-09 DIAGNOSIS — I25.118 ATHEROSCLEROTIC HEART DISEASE OF NATIVE CORONARY ARTERY WITH OTHER FORMS OF ANGINA PECTORIS: Primary | ICD-10-CM

## 2024-01-09 DIAGNOSIS — K21.9 GASTRO-ESOPHAGEAL REFLUX DISEASE WITHOUT ESOPHAGITIS: ICD-10-CM

## 2024-01-09 DIAGNOSIS — I27.20 PULMONARY HTN: ICD-10-CM

## 2024-01-09 DIAGNOSIS — Z95.1 HISTORY OF CORONARY ARTERY BYPASS GRAFT X 2: ICD-10-CM

## 2024-01-09 DIAGNOSIS — I10 ESSENTIAL (PRIMARY) HYPERTENSION: ICD-10-CM

## 2024-01-09 DIAGNOSIS — E78.2 MIXED HYPERLIPIDEMIA: ICD-10-CM

## 2024-01-09 PROCEDURE — 93000 ELECTROCARDIOGRAM COMPLETE: CPT | Mod: S$GLB,,, | Performed by: INTERNAL MEDICINE

## 2024-01-09 PROCEDURE — 1160F RVW MEDS BY RX/DR IN RCRD: CPT | Mod: CPTII,S$GLB,, | Performed by: INTERNAL MEDICINE

## 2024-01-09 PROCEDURE — 99999 PR PBB SHADOW E&M-EST. PATIENT-LVL IV: CPT | Mod: PBBFAC,,, | Performed by: INTERNAL MEDICINE

## 2024-01-09 PROCEDURE — 99214 OFFICE O/P EST MOD 30 MIN: CPT | Mod: S$GLB,,, | Performed by: INTERNAL MEDICINE

## 2024-01-09 PROCEDURE — 3075F SYST BP GE 130 - 139MM HG: CPT | Mod: CPTII,S$GLB,, | Performed by: INTERNAL MEDICINE

## 2024-01-09 PROCEDURE — 3079F DIAST BP 80-89 MM HG: CPT | Mod: CPTII,S$GLB,, | Performed by: INTERNAL MEDICINE

## 2024-01-09 PROCEDURE — 1159F MED LIST DOCD IN RCRD: CPT | Mod: CPTII,S$GLB,, | Performed by: INTERNAL MEDICINE

## 2024-01-09 PROCEDURE — 1126F AMNT PAIN NOTED NONE PRSNT: CPT | Mod: CPTII,S$GLB,, | Performed by: INTERNAL MEDICINE

## 2024-01-09 PROCEDURE — 3288F FALL RISK ASSESSMENT DOCD: CPT | Mod: CPTII,S$GLB,, | Performed by: INTERNAL MEDICINE

## 2024-01-09 PROCEDURE — 1101F PT FALLS ASSESS-DOCD LE1/YR: CPT | Mod: CPTII,S$GLB,, | Performed by: INTERNAL MEDICINE

## 2024-01-09 NOTE — ASSESSMENT & PLAN NOTE
Previous bypass surgery x2 with no active anginal symptoms continue on secondary prevention with lipid management and antiplatelet therapy and blood pressure control

## 2024-01-09 NOTE — PROGRESS NOTES
Subjective:    Patient ID:  Jose Marti is a 78 y.o. male patient here for evaluation Follow-up      History of Present Illness:   Jose Marti is 78-year-old gentleman who has history of coronary artery disease is seeking follow-up evaluation.  He is doing very well no occurrence of any angina shortness of breath PND orthopnea noted he remains fairly active he had inadvertently missed his blood pressure medicines yesterday.  Overall he is taking active with no cardiac symptoms noted.    No nausea vomiting no blood in the stools no black stools no blood in the urine      Review of patient's allergies indicates:   Allergen Reactions    Metoprolol succinate Hives     Broke out in hives        History reviewed. No pertinent past medical history.  Past Surgical History:   Procedure Laterality Date    BACK SURGERY  10/2018    Dr Menon-Lumbar  fusion L3-L5    CARDIAC SURGERY  2017    Dr MARK Marcial-[facility]- 2 vessel CABG    COLONOSCOPY  11/18/2013    Dr Ramos DUE 11/18/2018    GALLBLADDER SURGERY      TONSILLECTOMY       Social History     Tobacco Use    Smoking status: Never    Smokeless tobacco: Never   Substance Use Topics    Alcohol use: Yes    Drug use: Never        Review of Systems:    As noted in HPI in addition      REVIEW OF SYSTEMS  CARDIOVASCULAR: No recent chest pain, palpitations, arm, neck, or jaw pain  RESPIRATORY: No recent fever, cough chills, SOB or congestion  : No blood in the urine  GI: No Nausea, vomiting, constipation, diarrhea, blood, or reflux.  MUSCULOSKELETAL: No myalgias  NEURO: No lightheadedness or dizziness  EYES: No Double vision, blurry, vision or headache              Objective        Vitals:    01/09/24 0907   BP: 136/80   Pulse: 80   Resp: 16       LIPIDS - LAST 2   Lab Results   Component Value Date    CHOL 128 08/08/2023    CHOL 129 08/05/2022    HDL 51 08/08/2023    HDL 49 08/05/2022    LDLCALC 56 08/08/2023    LDLCALC 59 08/05/2022    TRIG 124 08/08/2023    TRIG 130 08/05/2022  "   CHOLHDL 2.5 08/08/2023    CHOLHDL 2.6 08/05/2022       CBC - LAST 2  Lab Results   Component Value Date    WBC 4.3 08/08/2023    WBC 2.6 (L) 08/05/2022    RBC 4.74 08/08/2023    RBC 4.62 08/05/2022    HGB 14.5 08/08/2023    HGB 13.9 08/05/2022    HCT 43.4 08/08/2023    HCT 42.6 08/05/2022    MCV 91.6 08/08/2023    MCV 92.2 08/05/2022    MCH 30.6 08/08/2023    MCH 30.1 08/05/2022    MCHC 33.4 08/08/2023    MCHC 32.6 08/05/2022    RDW 12.8 08/08/2023    RDW 12.9 08/05/2022     08/08/2023     08/05/2022    MPV 11.4 08/08/2023    MPV 10.3 08/05/2022    GRAN 1.74 (L) 05/15/2009    GRAN 53.7 05/15/2009    LYMPH 1,165 08/08/2023    LYMPH 27.1 08/08/2023    MONO 370 08/08/2023    MONO 8.6 08/08/2023    BASO 9 08/08/2023    BASO 10 08/05/2022       CHEMISTRY & LIVER FUNCTION - LAST 2  Lab Results   Component Value Date     08/08/2023     08/05/2022    K 4.6 08/08/2023    K 4.8 08/05/2022     08/08/2023     08/05/2022    CO2 28 08/08/2023    CO2 29 08/05/2022    BUN 18 08/08/2023    BUN 14 08/05/2022    CREATININE 0.90 08/08/2023    CREATININE 0.92 08/05/2022     (H) 08/08/2023    GLU 95 08/05/2022    CALCIUM 9.5 08/08/2023    CALCIUM 9.4 08/05/2022    ALBUMIN 4.4 08/08/2023    ALBUMIN 4.2 08/05/2022    PROT 6.5 08/08/2023    PROT 6.2 08/05/2022    ALKPHOS 65 01/24/2020    ALKPHOS 72 05/15/2009    ALT 17 08/08/2023    ALT 25 08/05/2022    AST 21 08/08/2023    AST 22 08/05/2022    BILITOT 1.8 (H) 08/08/2023    BILITOT 1.8 (H) 08/05/2022        CARDIAC PROFILE - LAST 2  No results found for: "BNP", "CPK", "CPKMB", "LDH", "TROPONINI", "TROPONINIHS"     COAGULATION - LAST 2  No results found for: "LABPT", "INR", "APTT"    ENDOCRINE & PSA - LAST 2  Lab Results   Component Value Date    MICROALBUR 1.0 01/24/2022    TSH 1.73 01/24/2022    TSH 1.15 01/24/2020    PSA 0.70 05/15/2009        ECHOCARDIOGRAM RESULTS  No results found for this or any previous visit.      CURRENT/PREVIOUS " VISIT EKG  Results for orders placed or performed in visit on 06/14/22   IN OFFICE EKG 12-LEAD (to [facility])    Collection Time: 06/14/22  2:37 PM    Narrative    Test Reason : E78.2,    Vent. Rate : 060 BPM     Atrial Rate : 060 BPM     P-R Int : 164 ms          QRS Dur : 102 ms      QT Int : 412 ms       P-R-T Axes : 076 013 035 degrees     QTc Int : 412 ms    Normal sinus rhythm  Normal ECG  When compared with ECG of 05-MAY-2021 10:07,  No significant change was found  Confirmed by Corwin Dominguez MD ([id number]) on 6/26/2022 6:41:02 PM    Referred By:             Confirmed By:Corwin Dominguez MD     No valid procedures specified.   No results found for this or any previous visit.    No valid procedures specified.    PHYSICAL EXAM  CONSTITUTIONAL: Well built, well nourished in no apparent distress  NECK: no carotid bruit, no JVD  LUNGS: CTA  CHEST WALL: no tenderness  HEART: regular rate and rhythm, S1, S2 normal, no murmur, click, rub or gallop   ABDOMEN: soft, non-tender; bowel sounds normal; no masses,  no organomegaly  EXTREMITIES: Extremities normal, no edema, no calf tenderness noted  NEURO: AAO X 3    I HAVE REVIEWED :    The vital signs, nurses notes, and all the pertinent radiology and labs.        Current Outpatient Medications   Medication Instructions    aspirin (ECOTRIN) 81 mg, Oral, Daily    atenoloL (TENORMIN) 25 MG tablet TAKE 1 TABLET DAILY    calcium carbonate-vitamin D3 600mg (1,000mg) -1,000 unit Tab Oral, Once    cinnamon bark 1,000 mg, Oral, Daily    coenzyme Q10 200 mg, Oral, Daily    cyclobenzaprine (FLEXERIL) 10 MG tablet prn    gabapentin (NEURONTIN) 300 mg, Oral, 2 times daily    glucosamine-D3-Boswellia serr 1,500-400-100 mg-unit-mg Tab Oral    losartan (COZAAR) 25 mg, Oral, Daily    magnesium gluconate 27.5 mg magne- sium (500 mg) Tab Oral, Once    meloxicam (MOBIC) 15 mg, Oral, Daily    metroNIDAZOLE (METROGEL) 0.75 % gel Topical (Top), 2 times daily    multivitamin (THERAGRAN) per tablet 1  tablet, Oral, Daily    nirmatrelvir-ritonavir (PAXLOVID) 300 mg (150 mg x 2)-100 mg copackaged tablets (EUA) Take 3 tablets by mouth 2 (two) times daily. Each dose contains 2 nirmatrelvir (pink tablets) and 1 ritonavir (white tablet). Take all 3 tablets together    omega 3-dha-epa-fish oil 1,000 mg (120 mg-180 mg) Cap No dose, route, or frequency recorded.    omeprazole (PRILOSEC) 20 mg, Oral, Daily    rosuvastatin (CRESTOR) 20 MG tablet TAKE 1 TABLET NIGHTLY    tamsulosin (FLOMAX) 0.4 mg, Oral, Daily    triamcinolone acetonide 0.025% (KENALOG) 0.025 % cream Topical (Top), 2 times daily      01/09/2024 EKG shows normal sinus rhythm within normal limits rate of 70 beats per minute and QT interval of 410 milliseconds    Assessment & Plan     Atherosclerotic heart disease of native coronary artery with other forms of angina pectoris  Continue on aggressive risk factor modification with secondary prevention maintain on aspirin his lipid levels are stable continue on Crestor 20 mg a day and 10 arm in 25 mg once daily.    Essential (primary) hypertension  Blood pressure is stable at 130 6/80 mm Hg and he missed inadvertently his dose of blood pressure medicines yesterday this could reflect slightly higher number today.  Losartan 25 mg once daily, 10 arm in 25 mg a day and maintain on low-salt diet and daily physical activity as tolerated.    History of coronary artery bypass graft x 2  Previous bypass surgery x2 with no active anginal symptoms continue on secondary prevention with lipid management and antiplatelet therapy and blood pressure control    Mixed hyperlipidemia  His lipid levels are stable the last results as follows   Latest Reference Range & Units 08/08/23 09:01   Cholesterol Total <200 mg/dL 128   HDL > OR = 40 mg/dL 51   HDL/Cholesterol Ratio <5.0 (calc) 2.5   Non HDL Chol. (LDL+VLDL) <130 mg/dL (calc) 77   Triglycerides <150 mg/dL 124   LDL Cholesterol mg/dL (calc) 56     He is therapeutic on Crestor at  this time at goal of LDL cholesterol below 60 and continue on 20 mg of Crestor nightly maintain on low-fat low-cholesterol diet and daily physical activity  Also to continue on omega-3 fatty acids, triglycerides stable at 124    Pulmonary HTN  Clinically he is stable.  Will defer echo for the time being    Gastro-esophageal reflux disease without esophagitis  Maintain on omeprazole he is taking 20 mg nightly maintain the same          Follow up in about 6 months (around 7/9/2024).

## 2024-01-09 NOTE — ASSESSMENT & PLAN NOTE
His lipid levels are stable the last results as follows   Latest Reference Range & Units 08/08/23 09:01   Cholesterol Total <200 mg/dL 128   HDL > OR = 40 mg/dL 51   HDL/Cholesterol Ratio <5.0 (calc) 2.5   Non HDL Chol. (LDL+VLDL) <130 mg/dL (calc) 77   Triglycerides <150 mg/dL 124   LDL Cholesterol mg/dL (calc) 56     He is therapeutic on Crestor at this time at goal of LDL cholesterol below 60 and continue on 20 mg of Crestor nightly maintain on low-fat low-cholesterol diet and daily physical activity  Also to continue on omega-3 fatty acids, triglycerides stable at 124

## 2024-01-09 NOTE — ASSESSMENT & PLAN NOTE
Continue on aggressive risk factor modification with secondary prevention maintain on aspirin his lipid levels are stable continue on Crestor 20 mg a day and 10 arm in 25 mg once daily.

## 2024-01-09 NOTE — ASSESSMENT & PLAN NOTE
Blood pressure is stable at 130 6/80 mm Hg and he missed inadvertently his dose of blood pressure medicines yesterday this could reflect slightly higher number today.  Losartan 25 mg once daily, 10 arm in 25 mg a day and maintain on low-salt diet and daily physical activity as tolerated.

## 2024-02-05 ENCOUNTER — TELEPHONE (OUTPATIENT)
Dept: FAMILY MEDICINE | Facility: CLINIC | Age: 79
End: 2024-02-05
Payer: MEDICARE

## 2024-02-05 DIAGNOSIS — Z79.899 ENCOUNTER FOR LONG-TERM (CURRENT) USE OF OTHER MEDICATIONS: Primary | ICD-10-CM

## 2024-02-05 DIAGNOSIS — I10 ESSENTIAL (PRIMARY) HYPERTENSION: ICD-10-CM

## 2024-02-05 DIAGNOSIS — Z95.1 HISTORY OF CORONARY ARTERY BYPASS GRAFT X 2: ICD-10-CM

## 2024-02-05 DIAGNOSIS — E78.2 MIXED HYPERLIPIDEMIA: ICD-10-CM

## 2024-02-09 LAB
OHS QRS DURATION: 100 MS
OHS QTC CALCULATION: 410 MS

## 2024-02-13 LAB
ALBUMIN SERPL-MCNC: 4.3 G/DL (ref 3.6–5.1)
ALBUMIN/CREAT UR: NORMAL MCG/MG CREAT
ALBUMIN/GLOB SERPL: 2 (CALC) (ref 1–2.5)
ALP SERPL-CCNC: 63 U/L (ref 35–144)
ALT SERPL-CCNC: 24 U/L (ref 9–46)
AST SERPL-CCNC: 27 U/L (ref 10–35)
BILIRUB SERPL-MCNC: 1.5 MG/DL (ref 0.2–1.2)
BUN SERPL-MCNC: 17 MG/DL (ref 7–25)
BUN/CREAT SERPL: ABNORMAL (CALC) (ref 6–22)
CALCIUM SERPL-MCNC: 9.3 MG/DL (ref 8.6–10.3)
CHLORIDE SERPL-SCNC: 103 MMOL/L (ref 98–110)
CHOLEST SERPL-MCNC: 152 MG/DL
CHOLEST/HDLC SERPL: 2.9 (CALC)
CO2 SERPL-SCNC: 31 MMOL/L (ref 20–32)
CREAT SERPL-MCNC: 0.87 MG/DL (ref 0.7–1.28)
CREAT UR-MCNC: 39 MG/DL (ref 20–320)
EGFR: 88 ML/MIN/1.73M2
GLOBULIN SER CALC-MCNC: 2.1 G/DL (CALC) (ref 1.9–3.7)
GLUCOSE SERPL-MCNC: 99 MG/DL (ref 65–99)
HDLC SERPL-MCNC: 52 MG/DL
LDLC SERPL CALC-MCNC: 77 MG/DL (CALC)
MICROALBUMIN UR-MCNC: <0.2 MG/DL
NONHDLC SERPL-MCNC: 100 MG/DL (CALC)
POTASSIUM SERPL-SCNC: 4.6 MMOL/L (ref 3.5–5.3)
PROT SERPL-MCNC: 6.4 G/DL (ref 6.1–8.1)
SODIUM SERPL-SCNC: 141 MMOL/L (ref 135–146)
TRIGL SERPL-MCNC: 125 MG/DL

## 2024-02-19 ENCOUNTER — OFFICE VISIT (OUTPATIENT)
Dept: FAMILY MEDICINE | Facility: CLINIC | Age: 79
End: 2024-02-19
Attending: FAMILY MEDICINE
Payer: MEDICARE

## 2024-02-19 VITALS
HEART RATE: 72 BPM | SYSTOLIC BLOOD PRESSURE: 128 MMHG | HEIGHT: 71 IN | BODY MASS INDEX: 30.8 KG/M2 | WEIGHT: 220 LBS | DIASTOLIC BLOOD PRESSURE: 80 MMHG

## 2024-02-19 DIAGNOSIS — M51.16 LUMBAR DISC DISEASE WITH RADICULOPATHY: Primary | ICD-10-CM

## 2024-02-19 DIAGNOSIS — N13.8 BPH WITH OBSTRUCTION/LOWER URINARY TRACT SYMPTOMS: ICD-10-CM

## 2024-02-19 DIAGNOSIS — Z12.5 SPECIAL SCREENING FOR MALIGNANT NEOPLASM OF PROSTATE: ICD-10-CM

## 2024-02-19 DIAGNOSIS — I25.118 ATHEROSCLEROTIC HEART DISEASE OF NATIVE CORONARY ARTERY WITH OTHER FORMS OF ANGINA PECTORIS: ICD-10-CM

## 2024-02-19 DIAGNOSIS — M75.42 IMPINGEMENT SYNDROME OF LEFT SHOULDER: ICD-10-CM

## 2024-02-19 DIAGNOSIS — N40.1 BPH WITH OBSTRUCTION/LOWER URINARY TRACT SYMPTOMS: ICD-10-CM

## 2024-02-19 DIAGNOSIS — Z95.1 HISTORY OF CORONARY ARTERY BYPASS GRAFT X 2: ICD-10-CM

## 2024-02-19 DIAGNOSIS — I10 ESSENTIAL (PRIMARY) HYPERTENSION: ICD-10-CM

## 2024-02-19 DIAGNOSIS — E78.2 MIXED HYPERLIPIDEMIA: ICD-10-CM

## 2024-02-19 DIAGNOSIS — K21.9 GASTRO-ESOPHAGEAL REFLUX DISEASE WITHOUT ESOPHAGITIS: ICD-10-CM

## 2024-02-19 DIAGNOSIS — M47.9 SPONDYLOSIS: ICD-10-CM

## 2024-02-19 DIAGNOSIS — M17.0 PRIMARY OSTEOARTHRITIS OF BOTH KNEES: ICD-10-CM

## 2024-02-19 DIAGNOSIS — L57.0 ACTINIC KERATOSIS: ICD-10-CM

## 2024-02-19 PROCEDURE — 3079F DIAST BP 80-89 MM HG: CPT | Mod: CPTII,S$GLB,, | Performed by: FAMILY MEDICINE

## 2024-02-19 PROCEDURE — 99214 OFFICE O/P EST MOD 30 MIN: CPT | Mod: S$GLB,,, | Performed by: FAMILY MEDICINE

## 2024-02-19 PROCEDURE — 1159F MED LIST DOCD IN RCRD: CPT | Mod: CPTII,S$GLB,, | Performed by: FAMILY MEDICINE

## 2024-02-19 PROCEDURE — 1101F PT FALLS ASSESS-DOCD LE1/YR: CPT | Mod: CPTII,S$GLB,, | Performed by: FAMILY MEDICINE

## 2024-02-19 PROCEDURE — 3074F SYST BP LT 130 MM HG: CPT | Mod: CPTII,S$GLB,, | Performed by: FAMILY MEDICINE

## 2024-02-19 PROCEDURE — 3288F FALL RISK ASSESSMENT DOCD: CPT | Mod: CPTII,S$GLB,, | Performed by: FAMILY MEDICINE

## 2024-02-19 RX ORDER — LOSARTAN POTASSIUM 25 MG/1
25 TABLET ORAL DAILY
Qty: 90 TABLET | Refills: 3 | Status: SHIPPED | OUTPATIENT
Start: 2024-02-19

## 2024-02-20 NOTE — PROGRESS NOTES
SUBJECTIVE:    Patient ID: Jose Marti is a 79 y.o. male.    Chief Complaint: Hypertension (Brought med list, need refill, review Lab-results, no complaints, abc )    79-year-old male here for six-month checkup.  Osteoarthritis of the knee-Dr. Armijo giving cortisone injections bilaterally q.3 months.  Patient not interested in knee replacements at this time.  Takes meloxicam 15 mg daily.    Lumbar disc disease-Dr. Claire-2nd BRIAN relieved the pain, has L2-3 disc disease, just above his 3 level lumbar fusion done in 2018.  Currently on no narcotic pain medications.    Cardiology Dr. DAR Dominguez visits q.6 months, 2017 two-vessel CABG, still mows the lawn and works at the [employer] once a week.  Denies chest pain or angina    Dermatology Dr. Mckeon-actinic keratoses cryo surgery.    [id number] RTC necessary    Nocturia 3-4 times per night, on tamsulosin 0.4 mg daily.    Hypertension  Associated symptoms include neck pain. Pertinent negatives include no chest pain, headaches or palpitations.       Telephone on 02/05/2024   Component Date Value Ref Range Status    Cholesterol 02/12/2024 152  <200 mg/dL Final    HDL 02/12/2024 52  > OR = 40 mg/dL Final    Triglycerides 02/12/2024 125  <150 mg/dL Final    LDL Cholesterol 02/12/2024 77  mg/dL (calc) Final    HDL/Cholesterol Ratio 02/12/2024 2.9  <5.0 (calc) Final    Non HDL Chol. (LDL+VLDL) 02/12/2024 100  <130 mg/dL (calc) Final    Glucose 02/12/2024 99  65 - 99 mg/dL Final    BUN 02/12/2024 17  7 - 25 mg/dL Final    Creatinine 02/12/2024 0.87  0.70 - 1.28 mg/dL Final    eGFR 02/12/2024 88  > OR = 60 mL/min/1.73m2 Final    BUN/Creatinine Ratio 02/12/2024 SEE NOTE:  6 - 22 (calc) Final    Sodium 02/12/2024 141  135 - 146 mmol/L Final    Potassium 02/12/2024 4.6  3.5 - 5.3 mmol/L Final    Chloride 02/12/2024 103  98 - 110 mmol/L Final    CO2 02/12/2024 31  20 - 32 mmol/L Final    Calcium 02/12/2024 9.3  8.6 - 10.3 mg/dL Final    Total Protein  02/12/2024 6.4  6.1 - 8.1 g/dL Final    Albumin 02/12/2024 4.3  3.6 - 5.1 g/dL Final    Globulin, Total 02/12/2024 2.1  1.9 - 3.7 g/dL (calc) Final    Albumin/Globulin Ratio 02/12/2024 2.0  1.0 - 2.5 (calc) Final    Total Bilirubin 02/12/2024 1.5 (H)  0.2 - 1.2 mg/dL Final    Alkaline Phosphatase 02/12/2024 63  35 - 144 U/L Final    AST 02/12/2024 27  10 - 35 U/L Final    ALT 02/12/2024 24  9 - 46 U/L Final    Creatinine, Urine 02/12/2024 39  20 - 320 mg/dL Final    Microalb, Ur 02/12/2024 <0.2  See Note: mg/dL Final    Microalb/Creat Ratio 02/12/2024 NOTE  <30 mcg/mg creat Final   Office Visit on 01/09/2024   Component Date Value Ref Range Status    QRS Duration 01/09/2024 100  ms Final    [facility] QTC Calculation 01/09/2024 410  ms Final       No past medical history on file.  Social History     Socioeconomic History    Marital status:    Tobacco Use    Smoking status: Never    Smokeless tobacco: Never   Substance and Sexual Activity    Alcohol use: Yes    Drug use: Never    Sexual activity: Yes     Partners: Female     Social Determinants of Health     Financial Resource Strain: Low Risk  (2/13/2024)    Overall Financial Resource Strain (CARDIA)     Difficulty of Paying Living Expenses: Not hard at all   Food Insecurity: No Food Insecurity (2/13/2024)    Hunger Vital Sign     Worried About Running Out of Food in the Last Year: Never true     Ran Out of Food in the Last Year: Never true   Transportation Needs: No Transportation Needs (2/13/2024)    PRAPARE - Transportation     Lack of Transportation (Medical): No     Lack of Transportation (Non-Medical): No   Physical Activity: Insufficiently Active (2/13/2024)    Exercise Vital Sign     Days of Exercise per Week: 2 days     Minutes of Exercise per Session: 60 min   Stress: No Stress Concern Present (2/13/2024)    [employer] - Occupational Stress Questionnaire     Feeling of Stress : Not at all   Social Connections: Unknown  (2/13/2024)    Social Connection and Isolation Panel [NHANES]     Frequency of Communication with Friends and Family: More than three times a week     Frequency of Social Gatherings with Friends and Family: Once a week     Active Member of Clubs or Organizations: Yes     Attends Club or Organization Meetings: More than 4 times per year     Marital Status:    Housing Stability: Low Risk  (2/13/2024)    Housing Stability Vital Sign     Unable to Pay for Housing in the Last Year: No     Number of Places Lived in the Last Year: 1     Unstable Housing in the Last Year: No     Past Surgical History:   Procedure Laterality Date    BACK SURGERY  10/2018    Dr Menon-Lumbar  fusion L3-L5    CARDIAC SURGERY  2017    Dr MARK Marcial-[facility]- 2 vessel CABG    COLONOSCOPY  11/18/2013    Dr Ramos DUE 11/18/2018    GALLBLADDER SURGERY      TONSILLECTOMY       No family history on file.    The CVD Risk score (Jaxsonino, et al., 2008) failed to calculate for the following reasons:    The 2008 CVD risk score is only valid for ages 30 to 74    All of your core healthy metrics are met.      Review of patient's allergies indicates:   Allergen Reactions    Metoprolol succinate Hives     Broke out in hives        Current Outpatient Medications:     aspirin (ECOTRIN) 81 MG EC tablet, Take 81 mg by mouth once daily., Disp: , Rfl:     atenoloL (TENORMIN) 25 MG tablet, TAKE 1 TABLET DAILY, Disp: 90 tablet, Rfl: 3    calcium carbonate-vitamin D3 600mg (1,000mg) -1,000 unit Tab, Take by mouth once., Disp: , Rfl:     cinnamon bark 500 mg capsule, Take 1,000 mg by mouth once daily., Disp: , Rfl:     coenzyme Q10 200 mg capsule, Take 200 mg by mouth once daily., Disp: , Rfl:     cyclobenzaprine (FLEXERIL) 10 MG tablet, prn, Disp: , Rfl:     gabapentin (NEURONTIN) 300 MG capsule, Take 1 capsule (300 mg total) by mouth 2 (two) times daily., Disp: 60 capsule, Rfl: 5    glucosamine-D3-Boswellia serr 1,500-400-100 mg-unit-mg Tab, Take by mouth.,  Disp: , Rfl:     magnesium gluconate 27.5 mg magne- sium (500 mg) Tab, Take by mouth once., Disp: , Rfl:     meloxicam (MOBIC) 15 MG tablet, Take 15 mg by mouth once daily., Disp: , Rfl:     multivitamin (THERAGRAN) per tablet, Take 1 tablet by mouth once daily., Disp: , Rfl:     omega 3-dha-epa-fish oil 1,000 mg (120 mg-180 mg) Cap, , Disp: , Rfl:     rosuvastatin (CRESTOR) 20 MG tablet, TAKE 1 TABLET NIGHTLY, Disp: 90 tablet, Rfl: 3    tamsulosin (FLOMAX) 0.4 mg Cap, Take 1 capsule (0.4 mg total) by mouth once daily., Disp: 90 capsule, Rfl: 3    triamcinolone acetonide 0.025% (KENALOG) 0.025 % cream, Apply topically 2 (two) times daily., Disp: 30 g, Rfl: 2    losartan (COZAAR) 25 MG tablet, Take 1 tablet (25 mg total) by mouth once daily., Disp: 90 tablet, Rfl: 3    metroNIDAZOLE (METROGEL) 0.75 % gel, Apply topically 2 (two) times daily., Disp: , Rfl:     nirmatrelvir-ritonavir (PAXLOVID) 300 mg (150 mg x 2)-100 mg copackaged tablets (EUA), Take 3 tablets by mouth 2 (two) times daily. Each dose contains 2 nirmatrelvir (pink tablets) and 1 ritonavir (white tablet). Take all 3 tablets together, Disp: 30 tablet, Rfl: 0    omeprazole (PRILOSEC) 20 MG capsule, Take 1 capsule (20 mg total) by mouth once daily., Disp: 90 capsule, Rfl: 3    Review of Systems   Constitutional:  Negative for activity change and unexpected weight change.   HENT:  Negative for hearing loss, rhinorrhea and trouble swallowing.    Eyes:  Negative for discharge and visual disturbance.   Respiratory:  Negative for chest tightness and wheezing.    Cardiovascular:  Negative for chest pain and palpitations.   Gastrointestinal:  Negative for blood in stool, constipation, diarrhea and vomiting.   Endocrine: Negative for polydipsia and polyuria.   Genitourinary:  Positive for urgency. Negative for difficulty urinating and hematuria.        Nocturia 3-4 times per night   Musculoskeletal:  Positive for neck pain. Negative for arthralgias and joint  "swelling.   Neurological:  Negative for weakness and headaches.   Psychiatric/Behavioral:  Negative for confusion and dysphoric mood.            Objective:      Vitals:    02/19/24 0842   BP: 128/80   Pulse: 72   Weight: 99.8 kg (220 lb)   Height: 5' 11" (1.803 m)     Physical Exam  Vitals and nursing note reviewed.   Constitutional:       General: He is not in acute distress.     Appearance: He is well-developed. He is obese. He is not toxic-appearing.   HENT:      Head: Normocephalic and atraumatic.      Right Ear: Tympanic membrane and external ear normal. There is impacted cerumen.      Left Ear: Tympanic membrane and external ear normal. There is impacted cerumen.      Nose: Nose normal.      Mouth/Throat:      Pharynx: Oropharynx is clear.   Eyes:      Pupils: Pupils are equal, round, and reactive to light.   Neck:      Thyroid: No thyromegaly.      Vascular: No carotid bruit.   Cardiovascular:      Rate and Rhythm: Normal rate and regular rhythm.      Heart sounds: Normal heart sounds. No murmur heard.  Pulmonary:      Effort: Pulmonary effort is normal.      Breath sounds: Normal breath sounds. No wheezing or rales.   Abdominal:      General: Bowel sounds are normal. There is no distension.      Palpations: Abdomen is soft.      Tenderness: There is no abdominal tenderness.   Musculoskeletal:         General: No tenderness or deformity. Normal range of motion.      Cervical back: Normal range of motion and neck supple.      Lumbar back: Normal. No spasms.      Comments: Bends 90 degrees at  waist, shoulders and knees somewhat crepitant.  No pitting edema to lower extremities   Lymphadenopathy:      Cervical: No cervical adenopathy.   Skin:     General: Skin is warm and dry.      Findings: No rash.   Neurological:      Mental Status: He is alert and oriented to person, place, and time. Mental status is at baseline.      Cranial Nerves: No cranial nerve deficit.      Coordination: Coordination normal.      " Gait: Gait normal.   Psychiatric:         Mood and Affect: Mood normal.         Behavior: Behavior normal.         Thought Content: Thought content normal.         Judgment: Judgment normal.           Assessment:       1. Lumbar disc disease with radiculopathy    2. Spondylosis    3. Essential (primary) hypertension    4. Special screening for malignant neoplasm of prostate    5. Mixed hyperlipidemia    6. History of coronary artery bypass graft x 2    7. Atherosclerotic heart disease of native coronary artery with other forms of angina pectoris    8. BPH with obstruction/lower urinary tract symptoms    9. Gastro-esophageal reflux disease without esophagitis    10. Primary osteoarthritis of both knees    11. Impingement syndrome of left shoulder    12. Actinic keratosis         Plan:       Lumbar disc disease with radiculopathy  Continue epidural injections with Dr. Claire  Spondylosis    Essential (primary) hypertension  -     losartan (COZAAR) 25 MG tablet; Take 1 tablet (25 mg total) by mouth once daily.  Dispense: 90 tablet; Refill: 3  -     Comprehensive Metabolic Panel; Future; Expected date: 02/19/2024  -     Lipid Panel; Future; Expected date: 02/19/2024  -     CBC Auto Differential; Future; Expected date: 02/19/2024  -     PSA, Screening; Future; Expected date: 02/19/2024  -     TSH w/reflex to FT4; Future; Expected date: 02/19/2024  -     Urinalysis, Reflex to Urine Culture Urine, Clean Catch; Future; Expected date: 02/19/2024  Blood pressure well controlled, routine lab work ordered for six-months  Special screening for malignant neoplasm of prostate  -     PSA, Screening; Future; Expected date: 02/19/2024    Mixed hyperlipidemia  Cholesterol 152 HDL 52  LDL 77, excellent lipid panel  History of coronary artery bypass graft x 2  No angina lately  Atherosclerotic heart disease of native coronary artery with other forms of angina pectoris    BPH with obstruction/lower urinary tract symptoms  Increase  tamsulosin 0.8 mg daily  Gastro-esophageal reflux disease without esophagitis    Primary osteoarthritis of both knees  Continue cortisone injections with Dr. Armijo  Impingement syndrome of left shoulder    Actinic keratosis  Continue dermatology treatments    Follow up in about 6 months (around 8/19/2024), or hld  bph.        2/19/2024 Abdon Schuler

## 2024-04-18 RX ORDER — ATENOLOL 25 MG/1
TABLET ORAL
Qty: 90 TABLET | Refills: 3 | Status: SHIPPED | OUTPATIENT
Start: 2024-04-18

## 2024-05-26 ENCOUNTER — PATIENT MESSAGE (OUTPATIENT)
Dept: FAMILY MEDICINE | Facility: CLINIC | Age: 79
End: 2024-05-26
Payer: MEDICARE

## 2024-05-26 DIAGNOSIS — N40.0 ENLARGED PROSTATE WITHOUT LOWER URINARY TRACT SYMPTOMS (LUTS): ICD-10-CM

## 2024-05-27 RX ORDER — TAMSULOSIN HYDROCHLORIDE 0.4 MG/1
0.8 CAPSULE ORAL DAILY
Qty: 180 CAPSULE | Refills: 1 | Status: SHIPPED | OUTPATIENT
Start: 2024-05-27

## 2024-08-06 ENCOUNTER — TELEPHONE (OUTPATIENT)
Dept: FAMILY MEDICINE | Facility: CLINIC | Age: 79
End: 2024-08-06
Payer: MEDICARE

## 2024-08-12 ENCOUNTER — TELEPHONE (OUTPATIENT)
Dept: FAMILY MEDICINE | Facility: CLINIC | Age: 79
End: 2024-08-12
Payer: MEDICARE

## 2024-08-17 ENCOUNTER — HOSPITAL ENCOUNTER (OUTPATIENT)
Facility: HOSPITAL | Age: 79
Discharge: HOME | End: 2024-08-18
Attending: EMERGENCY MEDICINE | Admitting: INTERNAL MEDICINE
Payer: MEDICARE

## 2024-08-17 DIAGNOSIS — R07.9 CHEST PAIN: Primary | ICD-10-CM

## 2024-08-17 LAB
ALBUMIN SERPL BCP-MCNC: 4.3 G/DL (ref 3.5–5.2)
ALP SERPL-CCNC: 54 U/L (ref 55–135)
ALT SERPL W/O P-5'-P-CCNC: 18 U/L (ref 10–44)
ANION GAP SERPL CALC-SCNC: 9 MMOL/L (ref 8–16)
AST SERPL-CCNC: 24 U/L (ref 10–40)
BASOPHILS # BLD AUTO: 0 K/UL (ref 0–0.2)
BASOPHILS NFR BLD: 0 % (ref 0–1.9)
BILIRUB SERPL-MCNC: 2.8 MG/DL (ref 0.1–1)
BNP SERPL-MCNC: 57 PG/ML (ref 0–99)
BUN SERPL-MCNC: 21 MG/DL (ref 8–23)
CALCIUM SERPL-MCNC: 9.6 MG/DL (ref 8.7–10.5)
CHLORIDE SERPL-SCNC: 107 MMOL/L (ref 95–110)
CO2 SERPL-SCNC: 24 MMOL/L (ref 23–29)
CREAT SERPL-MCNC: 1.2 MG/DL (ref 0.5–1.4)
DIFFERENTIAL METHOD BLD: ABNORMAL
EOSINOPHIL # BLD AUTO: 0 K/UL (ref 0–0.5)
EOSINOPHIL NFR BLD: 0.4 % (ref 0–8)
ERYTHROCYTE [DISTWIDTH] IN BLOOD BY AUTOMATED COUNT: 13.6 % (ref 11.5–14.5)
EST. GFR  (NO RACE VARIABLE): >60 ML/MIN/1.73 M^2
GLUCOSE SERPL-MCNC: 111 MG/DL (ref 70–110)
HCT VFR BLD AUTO: 40.2 % (ref 40–54)
HGB BLD-MCNC: 13.6 G/DL (ref 14–18)
IMM GRANULOCYTES # BLD AUTO: 0.01 K/UL (ref 0–0.04)
IMM GRANULOCYTES NFR BLD AUTO: 0.2 % (ref 0–0.5)
LYMPHOCYTES # BLD AUTO: 0.7 K/UL (ref 1–4.8)
LYMPHOCYTES NFR BLD: 14.3 % (ref 18–48)
MAGNESIUM SERPL-MCNC: 2.2 MG/DL (ref 1.6–2.6)
MCH RBC QN AUTO: 30.5 PG (ref 27–31)
MCHC RBC AUTO-ENTMCNC: 33.8 G/DL (ref 32–36)
MCV RBC AUTO: 90 FL (ref 82–98)
MONOCYTES # BLD AUTO: 0.4 K/UL (ref 0.3–1)
MONOCYTES NFR BLD: 8.1 % (ref 4–15)
NEUTROPHILS # BLD AUTO: 3.6 K/UL (ref 1.8–7.7)
NEUTROPHILS NFR BLD: 77 % (ref 38–73)
NRBC BLD-RTO: 0 /100 WBC
OHS QRS DURATION: 106 MS
OHS QTC CALCULATION: 386 MS
PLATELET # BLD AUTO: 167 K/UL (ref 150–450)
PMV BLD AUTO: 10 FL (ref 9.2–12.9)
POTASSIUM SERPL-SCNC: 4.2 MMOL/L (ref 3.5–5.1)
PROT SERPL-MCNC: 6.8 G/DL (ref 6–8.4)
RBC # BLD AUTO: 4.46 M/UL (ref 4.6–6.2)
SODIUM SERPL-SCNC: 140 MMOL/L (ref 136–145)
TROPONIN I SERPL HS-MCNC: 4.2 PG/ML (ref 0–14.9)
TROPONIN I SERPL HS-MCNC: 4.7 PG/ML (ref 0–14.9)
TROPONIN I SERPL HS-MCNC: 5 PG/ML (ref 0–14.9)
TROPONIN I SERPL HS-MCNC: 5.1 PG/ML (ref 0–14.9)
WBC # BLD AUTO: 4.7 K/UL (ref 3.9–12.7)

## 2024-08-17 PROCEDURE — 99285 EMERGENCY DEPT VISIT HI MDM: CPT | Mod: 25

## 2024-08-17 PROCEDURE — 25000003 PHARM REV CODE 250: Performed by: EMERGENCY MEDICINE

## 2024-08-17 PROCEDURE — 93005 ELECTROCARDIOGRAM TRACING: CPT | Performed by: GENERAL PRACTICE

## 2024-08-17 PROCEDURE — 84484 ASSAY OF TROPONIN QUANT: CPT | Mod: 91 | Performed by: NURSE PRACTITIONER

## 2024-08-17 PROCEDURE — 36415 COLL VENOUS BLD VENIPUNCTURE: CPT | Performed by: NURSE PRACTITIONER

## 2024-08-17 PROCEDURE — 84484 ASSAY OF TROPONIN QUANT: CPT | Performed by: EMERGENCY MEDICINE

## 2024-08-17 PROCEDURE — 80053 COMPREHEN METABOLIC PANEL: CPT | Performed by: EMERGENCY MEDICINE

## 2024-08-17 PROCEDURE — G0378 HOSPITAL OBSERVATION PER HR: HCPCS

## 2024-08-17 PROCEDURE — 96372 THER/PROPH/DIAG INJ SC/IM: CPT | Performed by: NURSE PRACTITIONER

## 2024-08-17 PROCEDURE — 25000003 PHARM REV CODE 250: Performed by: NURSE PRACTITIONER

## 2024-08-17 PROCEDURE — 63600175 PHARM REV CODE 636 W HCPCS: Performed by: NURSE PRACTITIONER

## 2024-08-17 PROCEDURE — 83735 ASSAY OF MAGNESIUM: CPT | Performed by: EMERGENCY MEDICINE

## 2024-08-17 PROCEDURE — 83880 ASSAY OF NATRIURETIC PEPTIDE: CPT | Performed by: EMERGENCY MEDICINE

## 2024-08-17 PROCEDURE — 93010 ELECTROCARDIOGRAM REPORT: CPT | Mod: ,,, | Performed by: GENERAL PRACTICE

## 2024-08-17 PROCEDURE — 85025 COMPLETE CBC W/AUTO DIFF WBC: CPT | Performed by: EMERGENCY MEDICINE

## 2024-08-17 RX ORDER — SODIUM,POTASSIUM PHOSPHATES 280-250MG
2 POWDER IN PACKET (EA) ORAL
Status: DISCONTINUED | OUTPATIENT
Start: 2024-08-17 | End: 2024-08-18 | Stop reason: HOSPADM

## 2024-08-17 RX ORDER — LANOLIN ALCOHOL/MO/W.PET/CERES
800 CREAM (GRAM) TOPICAL
Status: DISCONTINUED | OUTPATIENT
Start: 2024-08-17 | End: 2024-08-18 | Stop reason: HOSPADM

## 2024-08-17 RX ORDER — ENOXAPARIN SODIUM 100 MG/ML
40 INJECTION SUBCUTANEOUS EVERY 24 HOURS
Status: DISCONTINUED | OUTPATIENT
Start: 2024-08-17 | End: 2024-08-18 | Stop reason: HOSPADM

## 2024-08-17 RX ORDER — ASPIRIN 81 MG/1
81 TABLET ORAL DAILY
Status: DISCONTINUED | OUTPATIENT
Start: 2024-08-18 | End: 2024-08-18 | Stop reason: HOSPADM

## 2024-08-17 RX ORDER — LANOLIN ALCOHOL/MO/W.PET/CERES
400 CREAM (GRAM) TOPICAL DAILY
Status: DISCONTINUED | OUTPATIENT
Start: 2024-08-18 | End: 2024-08-18 | Stop reason: HOSPADM

## 2024-08-17 RX ORDER — ACETAMINOPHEN 325 MG/1
650 TABLET ORAL EVERY 4 HOURS PRN
Status: DISCONTINUED | OUTPATIENT
Start: 2024-08-17 | End: 2024-08-18 | Stop reason: HOSPADM

## 2024-08-17 RX ORDER — ASPIRIN 325 MG
325 TABLET ORAL
Status: COMPLETED | OUTPATIENT
Start: 2024-08-17 | End: 2024-08-17

## 2024-08-17 RX ORDER — LOSARTAN POTASSIUM 25 MG/1
25 TABLET ORAL DAILY
Status: DISCONTINUED | OUTPATIENT
Start: 2024-08-18 | End: 2024-08-18 | Stop reason: HOSPADM

## 2024-08-17 RX ORDER — MELOXICAM 7.5 MG/1
15 TABLET ORAL DAILY
Status: DISCONTINUED | OUTPATIENT
Start: 2024-08-18 | End: 2024-08-17

## 2024-08-17 RX ORDER — SODIUM CHLORIDE 0.9 % (FLUSH) 0.9 %
10 SYRINGE (ML) INJECTION
Status: DISCONTINUED | OUTPATIENT
Start: 2024-08-17 | End: 2024-08-18 | Stop reason: HOSPADM

## 2024-08-17 RX ORDER — GABAPENTIN 300 MG/1
300 CAPSULE ORAL 2 TIMES DAILY
Status: DISCONTINUED | OUTPATIENT
Start: 2024-08-17 | End: 2024-08-18 | Stop reason: HOSPADM

## 2024-08-17 RX ORDER — LANOLIN ALCOHOL/MO/W.PET/CERES
400 CREAM (GRAM) TOPICAL DAILY
COMMUNITY

## 2024-08-17 RX ADMIN — ASPIRIN 325 MG ORAL TABLET 325 MG: 325 PILL ORAL at 01:08

## 2024-08-17 RX ADMIN — ENOXAPARIN SODIUM 40 MG: 40 INJECTION SUBCUTANEOUS at 05:08

## 2024-08-17 RX ADMIN — GABAPENTIN 300 MG: 300 CAPSULE ORAL at 09:08

## 2024-08-17 NOTE — HPI
79-year-old male with a past medical history of hypertension, hyperlipidemia, pulmonary hypertension, GERD, angina, coronary artery disease status post CABG x2 in 2017 who presents to the emergency room with reports of chest pain that started in our ago, lasted 40 minutes and resolved spontaneously.  He describes it as heaviness in the chest, nonradiating, with some shortness for breath it began while he was watching television.  He reports mowing the lawn earlier that day and then taking a shower without symptoms.  He reports taking aspirin 81 mg today.  He denies nausea vomiting diaphoresis.  In the ER CBC stable CMP with T bili 2.8 otherwise stable, BNP 57, troponin I high sensitivity 4.7 EKG with Sinus bradycardia 55 beats per minute normal axis no ST elevation or depression or T-wave inversion, chest x-ray nonacute.  Due to patient's high-risk cardiac factors, we will admit to hospital medicine and get stress echo in the a.m. Patient's cardiologist is Dr. Dominguez.

## 2024-08-17 NOTE — ED PROVIDER NOTES
Encounter Date: 8/17/2024       History     Chief Complaint   Patient presents with    Chest Pain     Onset 15 min     79-year-old male history of CABG in 2017 presents with chest pain that is now resolved.  Started about an hour ago lasted around 40 minutes.  Describes it as a heaviness in his chest.  This began while he was at home watching TV.  Earlier in the day he had mowed the lawn and taken a shower without any symptoms.  He is asymptomatic at this time.  Took an 81 mg aspirin earlier today.  Had some shortness of breath during the symptoms but no nausea no diaphoresis no ripping or tearing pain.    The history is provided by the patient and the spouse.     Review of patient's allergies indicates:   Allergen Reactions    Metoprolol succinate Hives     Broke out in hives      No past medical history on file.  Past Surgical History:   Procedure Laterality Date    BACK SURGERY  10/2018    Dr Menon-Lumbar  fusion L3-L5    CARDIAC SURGERY  2017    Dr MARK [facility]cial[facility]- 2 vessel CABG    COLONOSCOPY  11/18/2013    Dr Ramos DUE 11/18/2018    GALLBLADDER SURGERY      TONSILLECTOMY       No family history on file.  Social History     Tobacco Use    Smoking status: Never    Smokeless tobacco: Never   Substance Use Topics    Alcohol use: Yes    Drug use: Never     Review of Systems   Constitutional:  Negative for fever.   HENT:  Negative for sore throat.    Respiratory:  Positive for shortness of breath.    Cardiovascular:  Positive for chest pain.   Gastrointestinal:  Negative for nausea.   Genitourinary:  Negative for dysuria.   Musculoskeletal:  Negative for back pain.   Skin:  Negative for rash.   Neurological:  Negative for weakness.   All other systems reviewed and are negative.      Physical Exam     Initial Vitals [08/17/24 1246]   BP Pulse Resp Temp SpO2   132/70 (!) 59 16 98.2 °F (36.8 °C) 100 %      MAP       --         Physical Exam    Nursing note and vitals reviewed.  Constitutional: He appears well-developed  and well-nourished. He is not diaphoretic.  Non-toxic appearance. He does not have a sickly appearance. He does not appear ill. No distress.   HENT:   Head: Normocephalic and atraumatic.   Eyes: EOM are normal.   Neck: Neck supple.   Normal range of motion.  Cardiovascular:  Normal rate, regular rhythm and normal heart sounds.     Exam reveals no gallop and no friction rub.       No murmur heard.  Pulmonary/Chest: Breath sounds normal. No respiratory distress. He has no wheezes. He has no rhonchi. He has no rales.   Abdominal: Abdomen is soft. He exhibits no distension. There is no abdominal tenderness. There is no rebound and no guarding.   Musculoskeletal:         General: Normal range of motion.      Cervical back: Normal range of motion and neck supple. No rigidity. Normal range of motion.     Neurological: He is alert and oriented to person, place, and time.   Skin: Skin is warm and dry. No rash noted.   Psychiatric: He has a normal mood and affect. His behavior is normal. Judgment and thought content normal.         ED Course   Procedures  Labs Reviewed   CBC W/ AUTO DIFFERENTIAL - Abnormal       Result Value    WBC 4.70      RBC 4.46 (*)     Hemoglobin 13.6 (*)     Hematocrit 40.2      MCV 90      MCH 30.5      MCHC 33.8      RDW 13.6      Platelets 167      MPV 10.0      Immature Granulocytes 0.2      Gran # (ANC) 3.6      Immature Grans (Abs) 0.01      Lymph # 0.7 (*)     Mono # 0.4      Eos # 0.0      Baso # 0.00      nRBC 0      Gran % 77.0 (*)     Lymph % 14.3 (*)     Mono % 8.1      Eosinophil % 0.4      Basophil % 0.0      Differential Method Automated     COMPREHENSIVE METABOLIC PANEL - Abnormal    Sodium 140      Potassium 4.2      Chloride 107      CO2 24      Glucose 111 (*)     BUN 21      Creatinine 1.2      Calcium 9.6      Total Protein 6.8      Albumin 4.3      Total Bilirubin 2.8 (*)     Alkaline Phosphatase 54 (*)     AST 24      ALT 18      eGFR >60.0      Anion Gap 9     MAGNESIUM     Magnesium 2.2     TROPONIN I HIGH SENSITIVITY    Troponin I High Sensitivity 4.7     B-TYPE NATRIURETIC PEPTIDE    BNP 57     TROPONIN I HIGH SENSITIVITY   TROPONIN I HIGH SENSITIVITY   TROPONIN I HIGH SENSITIVITY        ECG Results              EKG 12-lead (In process)        Collection Time Result Time QRS Duration OHS QTC Calculation    08/17/24 12:46:46 08/17/24 14:17:26 106 386                     In process by Interface, Lab In [facility] (08/17/24 14:17:31)                   Narrative:    Test Reason : R07.9,    Vent. Rate : 055 BPM     Atrial Rate : 055 BPM     P-R Int : 142 ms          QRS Dur : 106 ms      QT Int : 404 ms       P-R-T Axes : 026 -20 022 degrees     QTc Int : 386 ms    Sinus bradycardia  Otherwise normal ECG  No previous ECGs available    Referred By: [email or web address]   SELF           Confirmed By:                                   Imaging Results              X-Ray Chest AP Portable (Final result)  Result time 08/17/24 13:22:53      Final result by Doug Avendano DO (08/17/24 13:22:53)                   Impression:      No acute cardiopulmonary abnormality.      Electronically signed by: Doug Avendano  Date:    08/17/2024  Time:    13:22               Narrative:    EXAMINATION:  XR CHEST AP PORTABLE    CLINICAL HISTORY:  Chest Pain;    FINDINGS:  Portable chest with comparison chest x-ray 07/02/2017.  Median sternotomy wires noted..  Normal cardiomediastinal silhouette.Lungs are clear. Pulmonary vasculature is normal. No acute osseous abnormality.                                       Medications   sodium chloride 0.9% flush 10 mL (has no administration in time range)   acetaminophen tablet 650 mg (has no administration in time range)   aspirin tablet 325 mg (325 mg Oral Given 8/17/24 1324)     Medical Decision Making  79-year-old male history of CABG presents with an episode of chest pain prior to arrival.  Symptoms had resolved by the time that I saw him.  Initial troponin is negative.  He will  be admitted for cardiac workup.  I doubt PE I doubt dissection.    Amount and/or Complexity of Data Reviewed  Labs: ordered. Decision-making details documented in ED Course.  Radiology: ordered.  ECG/medicine tests: ordered and independent interpretation performed. Decision-making details documented in ED Course.    Risk  OTC drugs.  Decision regarding hospitalization.               ED Course as of 08/17/24 1631   Sat Aug 17, 2024   1248 Sinus bradycardia 55 beats per minute normal axis no ST elevation or depression or T-wave inversion independently interpreted [EF]   1417 Troponin I High Sensitivity: 4.7 [EF]   1453 Maura to admit [EF]      ED Course User Index  [EF] Alejandro Chavez MD                           Clinical Impression:  Final diagnoses:  [R07.9] Chest pain          ED Disposition Condition    Observation Stable                Alejandro Chavez MD  08/17/24 1631

## 2024-08-17 NOTE — ASSESSMENT & PLAN NOTE
High risk cardiac history, DESHAUN 4  Troponin I high sensitivity 4.7  Tele monitoring  Serial troponin levels  EKG p.r.n.  NPO after midnight for echo stress in a.m.

## 2024-08-17 NOTE — PLAN OF CARE
Problem: Chest Pain  Goal: Resolution of Chest Pain Symptoms  Outcome: Progressing  Intervention: Manage Acute Chest Pain  Flowsheets (Taken 8/17/2024 1711)  Chest Pain Intervention:   cardiac monitoring continued   activity minimized   transferred to higher level of care

## 2024-08-17 NOTE — ASSESSMENT & PLAN NOTE
Chronic, controlled. Latest blood pressure and vitals reviewed-     Temp:  [98.2 °F (36.8 °C)-98.4 °F (36.9 °C)]   Pulse:  [56-67]   Resp:  [11-20]   BP: (127-162)/(69-84)   SpO2:  [97 %-100 %] .   Home meds for hypertension were reviewed and noted below.   Hypertension Medications               atenoloL (TENORMIN) 25 MG tablet TAKE 1 TABLET DAILY    losartan (COZAAR) 25 MG tablet Take 1 tablet (25 mg total) by mouth once daily.            While in the hospital, will manage blood pressure as follows; Continue home antihypertensive regimen    Will utilize p.r.n. blood pressure medication only if patient's blood pressure greater than 180/110 and he develops symptoms such as worsening chest pain or shortness of breath.

## 2024-08-17 NOTE — H&P
[facility] Medicine  History & Physical    Patient Name: Jose Marti  MRN: [id number]  Patient Class: OP- Observation  Admission Date: 8/17/2024  Attending Physician: Gasper Bonilla MD   Primary Care Provider: Abdon Schuler MD         Patient information was obtained from patient, past medical records, and ER records.     Subjective:     Principal Problem:Chest pain    Chief Complaint:   Chief Complaint   Patient presents with    Chest Pain     Onset 15 min        HPI: 79-year-old male with a past medical history of hypertension, hyperlipidemia, pulmonary hypertension, GERD, angina, coronary artery disease status post CABG x2 in 2017 who presents to the emergency room with reports of chest pain that started in our ago, lasted 40 minutes and resolved spontaneously.  He describes it as heaviness in the chest, nonradiating, with some shortness for breath it began while he was watching television.  He reports mowing the lawn earlier that day and then taking a shower without symptoms.  He reports taking aspirin 81 mg today.  He denies nausea vomiting diaphoresis.  In the ER CBC stable CMP with T bili 2.8 otherwise stable, BNP 57, troponin I high sensitivity 4.7 EKG with Sinus bradycardia 55 beats per minute normal axis no ST elevation or depression or T-wave inversion, chest x-ray nonacute.  Due to patient's high-risk cardiac factors, we will admit to hospital medicine and get stress echo in the a.m. Patient's cardiologist is Dr. Dominguez.        No past medical history on file.    Past Surgical History:   Procedure Laterality Date    BACK SURGERY  10/2018    Dr Menon-Lumbar  fusion L3-L5    CARDIAC SURGERY  2017    Dr MARK Marcial-[facility]- 2 vessel CABG    COLONOSCOPY  11/18/2013    Dr Ramos DUE 11/18/2018    GALLBLADDER SURGERY      TONSILLECTOMY         Review of patient's allergies indicates:   Allergen Reactions    Metoprolol succinate Hives     Broke out in hives        No current  facility-administered medications on file prior to encounter.     Current Outpatient Medications on File Prior to Encounter   Medication Sig    aspirin (ECOTRIN) 81 MG EC tablet Take 81 mg by mouth once daily.    atenoloL (TENORMIN) 25 MG tablet TAKE 1 TABLET DAILY (Patient taking differently: Take 25 mg by mouth once daily.)    calcium carbonate-vitamin D3 600mg (1,000mg) -1,000 unit Tab Take 1 tablet by mouth once daily.    cinnamon bark 500 mg capsule Take 1,000 mg by mouth once daily.    coenzyme Q10 200 mg capsule Take 1,200 mg by mouth 2 (two) times daily.    gabapentin (NEURONTIN) 300 MG capsule Take 1 capsule (300 mg total) by mouth 2 (two) times daily.    glucosamine-D3-Boswellia serr 1,500-400-100 mg-unit-mg Tab Take 1 tablet by mouth once daily.    losartan (COZAAR) 25 MG tablet Take 1 tablet (25 mg total) by mouth once daily.    magnesium oxide (MAG-OX) 400 mg (241.3 mg magnesium) tablet Take 400 mg by mouth once daily.    meloxicam (MOBIC) 15 MG tablet Take 15 mg by mouth once daily.    multivitamin (THERAGRAN) per tablet Take 1 tablet by mouth once daily.    omega 3-dha-epa-fish oil 1,000 mg (120 mg-180 mg) Cap Take 1 capsule by mouth 2 (two) times a day.    omeprazole (PRILOSEC) 20 MG capsule Take 1 capsule (20 mg total) by mouth once daily.    rosuvastatin (CRESTOR) 20 MG tablet TAKE 1 TABLET NIGHTLY (Patient taking differently: Take 20 mg by mouth every evening.)    tamsulosin (FLOMAX) 0.4 mg Cap Take 2 capsules (0.8 mg total) by mouth once daily.    [DISCONTINUED] cyclobenzaprine (FLEXERIL) 10 MG tablet prn    [DISCONTINUED] magnesium gluconate 27.5 mg magne- sium (500 mg) Tab Take by mouth once.    [DISCONTINUED] metroNIDAZOLE (METROGEL) 0.75 % gel Apply topically 2 (two) times daily.    [DISCONTINUED] triamcinolone acetonide 0.025% (KENALOG) 0.025 % cream Apply topically 2 (two) times daily.     Family History    None       Tobacco Use    Smoking status: Never    Smokeless tobacco: Never    Substance and Sexual Activity    Alcohol use: Yes    Drug use: Never    Sexual activity: Yes     Partners: Female     Review of Systems   Constitutional: Negative.    Respiratory:  Positive for shortness of breath. Negative for cough and chest tightness.    Cardiovascular:  Positive for chest pain. Negative for palpitations and leg swelling.   Gastrointestinal: Negative.    Genitourinary: Negative.    Musculoskeletal: Negative.    Psychiatric/Behavioral: Negative.       Objective:     Vital Signs (Most Recent):  Temp: 98.4 °F (36.9 °C) (08/17/24 1615)  Pulse: 67 (08/17/24 1615)  Resp: 20 (08/17/24 1615)  BP: (!) 144/84 (08/17/24 1615)  SpO2: 97 % (08/17/24 1615) Vital Signs (24h Range):  Temp:  [98.2 °F (36.8 °C)-98.4 °F (36.9 °C)] 98.4 °F (36.9 °C)  Pulse:  [56-67] 67  Resp:  [11-20] 20  SpO2:  [97 %-100 %] 97 %  BP: (127-162)/(69-84) 144/84     Weight: 94.8 kg (209 lb 1.6 oz)  Body mass index is 27.97 kg/m².     Physical Exam  Vitals reviewed.   Constitutional:       General: He is not in acute distress.     Appearance: Normal appearance. He is not ill-appearing.   HENT:      Head: Normocephalic and atraumatic.      Mouth/Throat:      Mouth: Mucous membranes are moist.   Eyes:      Extraocular Movements: Extraocular movements intact.      Pupils: Pupils are equal, round, and reactive to light.   Cardiovascular:      Rate and Rhythm: Normal rate.      Heart sounds: No murmur heard.  Pulmonary:      Effort: Pulmonary effort is normal. No respiratory distress.      Breath sounds: Normal breath sounds.   Abdominal:      General: Bowel sounds are normal. There is no distension.   Musculoskeletal:         General: No swelling. Normal range of motion.      Cervical back: Normal range of motion and neck supple.   Skin:     General: Skin is warm and dry.      Capillary Refill: Capillary refill takes less than 2 seconds.   Neurological:      General: No focal deficit present.      Mental Status: He is alert. Mental status  is at baseline.   Psychiatric:         Mood and Affect: Mood normal.              CRANIAL NERVES     CN III, IV, VI   Pupils are equal, round, and reactive to light.       Significant Labs: All pertinent labs within the past 24 hours have been reviewed.  Recent Lab Results         08/17/24  1256        Albumin 4.3       ALP 54       ALT 18       Anion Gap 9       AST 24       Baso # 0.00       Basophil % 0.0       BILIRUBIN TOTAL 2.8  Comment: For infants and newborns, interpretation of results should be based  on gestational age, weight and in agreement with clinical  observations.    Premature Infant recommended reference ranges:  Up to 24 hours.............<8.0 mg/dL  Up to 48 hours............<12.0 mg/dL  3-5 days..................<15.0 mg/dL  6-29 days.................<15.0 mg/dL         BNP 57  Comment: Values of less than 100 pg/ml are consistent with non-CHF populations.       BUN 21       Calcium 9.6       Chloride 107       CO2 24       Creatinine 1.2       Differential Method Automated       eGFR >60.0       Eos # 0.0       Eos % 0.4       Glucose 111       Gran # (ANC) 3.6       Gran % 77.0       Hematocrit 40.2       Hemoglobin 13.6       Immature Grans (Abs) 0.01  Comment: Mild elevation in immature granulocytes is non specific and   can be seen in a variety of conditions including stress response,   acute inflammation, trauma and pregnancy. Correlation with other   laboratory and clinical findings is essential.         Immature Granulocytes 0.2       Lymph # 0.7       Lymph % 14.3       Magnesium  2.2       MCH 30.5       MCHC 33.8       MCV 90       Mono # 0.4       Mono % 8.1       MPV 10.0       nRBC 0       Platelet Count 167       Potassium 4.2       PROTEIN TOTAL 6.8       RBC 4.46       RDW 13.6       Sodium 140       Troponin I High Sensitivity 4.7  Comment: Troponin results differ between methods. Do not use   results between Troponin methods interchangeably.    Alkaline Phospatase levels  above 400 U/L may   cause false positive results.    Access hsTnI should not be used for patients taking   Asfotase valentina (Strensiq).         WBC 4.70               Significant Imaging: I have reviewed all pertinent imaging results/findings within the past 24 hours.  Imaging Results              X-Ray Chest AP Portable (Final result)  Result time 08/17/24 13:22:53      Final result by Doug Avendano DO (08/17/24 13:22:53)                   Impression:      No acute cardiopulmonary abnormality.      Electronically signed by: Doug Avendano  Date:    08/17/2024  Time:    13:22               Narrative:    EXAMINATION:  XR CHEST AP PORTABLE    CLINICAL HISTORY:  Chest Pain;    FINDINGS:  Portable chest with comparison chest x-ray 07/02/2017.  Median sternotomy wires noted..  Normal cardiomediastinal silhouette.Lungs are clear. Pulmonary vasculature is normal. No acute osseous abnormality.                                      Assessment/Plan:     * Chest pain  High risk cardiac history, DESHAUN 4  Troponin I high sensitivity 4.7  Tele monitoring  Serial troponin levels  EKG p.r.n.  NPO after midnight for echo stress in a.m.      Essential (primary) hypertension  Chronic, controlled. Latest blood pressure and vitals reviewed-     Temp:  [98.2 °F (36.8 °C)-98.4 °F (36.9 °C)]   Pulse:  [56-67]   Resp:  [11-20]   BP: (127-162)/(69-84)   SpO2:  [97 %-100 %] .   Home meds for hypertension were reviewed and noted below.   Hypertension Medications               atenoloL (TENORMIN) 25 MG tablet TAKE 1 TABLET DAILY    losartan (COZAAR) 25 MG tablet Take 1 tablet (25 mg total) by mouth once daily.            While in the hospital, will manage blood pressure as follows; Continue home antihypertensive regimen    Will utilize p.r.n. blood pressure medication only if patient's blood pressure greater than 180/110 and he develops symptoms such as worsening chest pain or shortness of breath.      VTE Risk Mitigation (From admission, onward)            Ordered     enoxaparin injection 40 mg  Every 24 hours         08/17/24 1634                         On 08/17/2024, patient should be placed in hospital observation services under my care in collaboration with Dr. Bonilla.           Maura Hanna, NP  Department of Hospital Medicine  [facility]

## 2024-08-17 NOTE — SUBJECTIVE & OBJECTIVE
No past medical history on file.    Past Surgical History:   Procedure Laterality Date    BACK SURGERY  10/2018    Dr Menon-Lumbar  fusion L3-L5    CARDIAC SURGERY  2017    Dr MARK Marcial-[facility]- 2 vessel CABG    COLONOSCOPY  11/18/2013    Dr Ramos DUE 11/18/2018    GALLBLADDER SURGERY      TONSILLECTOMY         Review of patient's allergies indicates:   Allergen Reactions    Metoprolol succinate Hives     Broke out in hives        No current facility-administered medications on file prior to encounter.     Current Outpatient Medications on File Prior to Encounter   Medication Sig    aspirin (ECOTRIN) 81 MG EC tablet Take 81 mg by mouth once daily.    atenoloL (TENORMIN) 25 MG tablet TAKE 1 TABLET DAILY (Patient taking differently: Take 25 mg by mouth once daily.)    calcium carbonate-vitamin D3 600mg (1,000mg) -1,000 unit Tab Take 1 tablet by mouth once daily.    cinnamon bark 500 mg capsule Take 1,000 mg by mouth once daily.    coenzyme Q10 200 mg capsule Take 1,200 mg by mouth 2 (two) times daily.    gabapentin (NEURONTIN) 300 MG capsule Take 1 capsule (300 mg total) by mouth 2 (two) times daily.    glucosamine-D3-Boswellia serr 1,500-400-100 mg-unit-mg Tab Take 1 tablet by mouth once daily.    losartan (COZAAR) 25 MG tablet Take 1 tablet (25 mg total) by mouth once daily.    magnesium oxide (MAG-OX) 400 mg (241.3 mg magnesium) tablet Take 400 mg by mouth once daily.    meloxicam (MOBIC) 15 MG tablet Take 15 mg by mouth once daily.    multivitamin (THERAGRAN) per tablet Take 1 tablet by mouth once daily.    omega 3-dha-epa-fish oil 1,000 mg (120 mg-180 mg) Cap Take 1 capsule by mouth 2 (two) times a day.    omeprazole (PRILOSEC) 20 MG capsule Take 1 capsule (20 mg total) by mouth once daily.    rosuvastatin (CRESTOR) 20 MG tablet TAKE 1 TABLET NIGHTLY (Patient taking differently: Take 20 mg by mouth every evening.)    tamsulosin (FLOMAX) 0.4 mg Cap Take 2 capsules (0.8 mg total) by mouth once daily.     [DISCONTINUED] cyclobenzaprine (FLEXERIL) 10 MG tablet prn    [DISCONTINUED] magnesium gluconate 27.5 mg magne- sium (500 mg) Tab Take by mouth once.    [DISCONTINUED] metroNIDAZOLE (METROGEL) 0.75 % gel Apply topically 2 (two) times daily.    [DISCONTINUED] triamcinolone acetonide 0.025% (KENALOG) 0.025 % cream Apply topically 2 (two) times daily.     Family History    None       Tobacco Use    Smoking status: Never    Smokeless tobacco: Never   Substance and Sexual Activity    Alcohol use: Yes    Drug use: Never    Sexual activity: Yes     Partners: Female     Review of Systems   Constitutional: Negative.    Respiratory:  Positive for shortness of breath. Negative for cough and chest tightness.    Cardiovascular:  Positive for chest pain. Negative for palpitations and leg swelling.   Gastrointestinal: Negative.    Genitourinary: Negative.    Musculoskeletal: Negative.    Psychiatric/Behavioral: Negative.       Objective:     Vital Signs (Most Recent):  Temp: 98.4 °F (36.9 °C) (08/17/24 1615)  Pulse: 67 (08/17/24 1615)  Resp: 20 (08/17/24 1615)  BP: (!) 144/84 (08/17/24 1615)  SpO2: 97 % (08/17/24 1615) Vital Signs (24h Range):  Temp:  [98.2 °F (36.8 °C)-98.4 °F (36.9 °C)] 98.4 °F (36.9 °C)  Pulse:  [56-67] 67  Resp:  [11-20] 20  SpO2:  [97 %-100 %] 97 %  BP: (127-162)/(69-84) 144/84     Weight: 94.8 kg (209 lb 1.6 oz)  Body mass index is 27.97 kg/m².     Physical Exam  Vitals reviewed.   Constitutional:       General: He is not in acute distress.     Appearance: Normal appearance. He is not ill-appearing.   HENT:      Head: Normocephalic and atraumatic.      Mouth/Throat:      Mouth: Mucous membranes are moist.   Eyes:      Extraocular Movements: Extraocular movements intact.      Pupils: Pupils are equal, round, and reactive to light.   Cardiovascular:      Rate and Rhythm: Normal rate.      Heart sounds: No murmur heard.  Pulmonary:      Effort: Pulmonary effort is normal. No respiratory distress.      Breath  sounds: Normal breath sounds.   Abdominal:      General: Bowel sounds are normal. There is no distension.   Musculoskeletal:         General: No swelling. Normal range of motion.      Cervical back: Normal range of motion and neck supple.   Skin:     General: Skin is warm and dry.      Capillary Refill: Capillary refill takes less than 2 seconds.   Neurological:      General: No focal deficit present.      Mental Status: He is alert. Mental status is at baseline.   Psychiatric:         Mood and Affect: Mood normal.              CRANIAL NERVES     CN III, IV, VI   Pupils are equal, round, and reactive to light.       Significant Labs: All pertinent labs within the past 24 hours have been reviewed.  Recent Lab Results         08/17/24  1256        Albumin 4.3       ALP 54       ALT 18       Anion Gap 9       AST 24       Baso # 0.00       Basophil % 0.0       BILIRUBIN TOTAL 2.8  Comment: For infants and newborns, interpretation of results should be based  on gestational age, weight and in agreement with clinical  observations.    Premature Infant recommended reference ranges:  Up to 24 hours.............<8.0 mg/dL  Up to 48 hours............<12.0 mg/dL  3-5 days..................<15.0 mg/dL  6-29 days.................<15.0 mg/dL         BNP 57  Comment: Values of less than 100 pg/ml are consistent with non-CHF populations.       BUN 21       Calcium 9.6       Chloride 107       CO2 24       Creatinine 1.2       Differential Method Automated       eGFR >60.0       Eos # 0.0       Eos % 0.4       Glucose 111       Gran # (ANC) 3.6       Gran % 77.0       Hematocrit 40.2       Hemoglobin 13.6       Immature Grans (Abs) 0.01  Comment: Mild elevation in immature granulocytes is non specific and   can be seen in a variety of conditions including stress response,   acute inflammation, trauma and pregnancy. Correlation with other   laboratory and clinical findings is essential.         Immature Granulocytes 0.2       Lymph #  0.7       Lymph % 14.3       Magnesium  2.2       MCH 30.5       MCHC 33.8       MCV 90       Mono # 0.4       Mono % 8.1       MPV 10.0       nRBC 0       Platelet Count 167       Potassium 4.2       PROTEIN TOTAL 6.8       RBC 4.46       RDW 13.6       Sodium 140       Troponin I High Sensitivity 4.7  Comment: Troponin results differ between methods. Do not use   results between Troponin methods interchangeably.    Alkaline Phospatase levels above 400 U/L may   cause false positive results.    Access hsTnI should not be used for patients taking   Asfotase valentina (Strensiq).         WBC 4.70               Significant Imaging: I have reviewed all pertinent imaging results/findings within the past 24 hours.  Imaging Results              X-Ray Chest AP Portable (Final result)  Result time 08/17/24 13:22:53      Final result by Doug Avendano DO (08/17/24 13:22:53)                   Impression:      No acute cardiopulmonary abnormality.      Electronically signed by: Doug Avendano  Date:    08/17/2024  Time:    13:22               Narrative:    EXAMINATION:  XR CHEST AP PORTABLE    CLINICAL HISTORY:  Chest Pain;    FINDINGS:  Portable chest with comparison chest x-ray 07/02/2017.  Median sternotomy wires noted..  Normal cardiomediastinal silhouette.Lungs are clear. Pulmonary vasculature is normal. No acute osseous abnormality.

## 2024-08-18 ENCOUNTER — PATIENT MESSAGE (OUTPATIENT)
Dept: CARDIOLOGY | Facility: CLINIC | Age: 79
End: 2024-08-18
Payer: MEDICARE

## 2024-08-18 ENCOUNTER — CLINICAL SUPPORT (OUTPATIENT)
Dept: CARDIOLOGY | Facility: HOSPITAL | Age: 79
End: 2024-08-18
Attending: EMERGENCY MEDICINE
Payer: MEDICARE

## 2024-08-18 VITALS
HEART RATE: 55 BPM | DIASTOLIC BLOOD PRESSURE: 71 MMHG | SYSTOLIC BLOOD PRESSURE: 122 MMHG | RESPIRATION RATE: 18 BRPM | OXYGEN SATURATION: 97 % | WEIGHT: 209.13 LBS | BODY MASS INDEX: 27.72 KG/M2 | TEMPERATURE: 98 F | HEIGHT: 73 IN

## 2024-08-18 LAB
ALBUMIN SERPL BCP-MCNC: 3.7 G/DL (ref 3.5–5.2)
ALP SERPL-CCNC: 50 U/L (ref 55–135)
ALT SERPL W/O P-5'-P-CCNC: 13 U/L (ref 10–44)
ANION GAP SERPL CALC-SCNC: 6 MMOL/L (ref 8–16)
AST SERPL-CCNC: 19 U/L (ref 10–40)
BASOPHILS # BLD AUTO: 0 K/UL (ref 0–0.2)
BASOPHILS NFR BLD: 0 % (ref 0–1.9)
BILIRUB SERPL-MCNC: 1.7 MG/DL (ref 0.1–1)
BSA FOR ECHO PROCEDURE: 2.2 M2
BUN SERPL-MCNC: 20 MG/DL (ref 8–23)
CALCIUM SERPL-MCNC: 8.9 MG/DL (ref 8.7–10.5)
CHLORIDE SERPL-SCNC: 110 MMOL/L (ref 95–110)
CO2 SERPL-SCNC: 26 MMOL/L (ref 23–29)
CREAT SERPL-MCNC: 1 MG/DL (ref 0.5–1.4)
CV STRESS BASE HR: 57 BPM
DIASTOLIC BLOOD PRESSURE: 80 MMHG
DIFFERENTIAL METHOD BLD: ABNORMAL
EOSINOPHIL # BLD AUTO: 0.1 K/UL (ref 0–0.5)
EOSINOPHIL NFR BLD: 2.2 % (ref 0–8)
ERYTHROCYTE [DISTWIDTH] IN BLOOD BY AUTOMATED COUNT: 13.7 % (ref 11.5–14.5)
EST. GFR  (NO RACE VARIABLE): >60 ML/MIN/1.73 M^2
GLUCOSE SERPL-MCNC: 115 MG/DL (ref 70–110)
HCT VFR BLD AUTO: 39.7 % (ref 40–54)
HGB BLD-MCNC: 13.1 G/DL (ref 14–18)
IMM GRANULOCYTES # BLD AUTO: 0.03 K/UL (ref 0–0.04)
IMM GRANULOCYTES NFR BLD AUTO: 0.7 % (ref 0–0.5)
LYMPHOCYTES # BLD AUTO: 1.3 K/UL (ref 1–4.8)
LYMPHOCYTES NFR BLD: 30.9 % (ref 18–48)
MAGNESIUM SERPL-MCNC: 2.1 MG/DL (ref 1.6–2.6)
MCH RBC QN AUTO: 30.1 PG (ref 27–31)
MCHC RBC AUTO-ENTMCNC: 33 G/DL (ref 32–36)
MCV RBC AUTO: 91 FL (ref 82–98)
MONOCYTES # BLD AUTO: 0.4 K/UL (ref 0.3–1)
MONOCYTES NFR BLD: 9.7 % (ref 4–15)
NEUTROPHILS # BLD AUTO: 2.3 K/UL (ref 1.8–7.7)
NEUTROPHILS NFR BLD: 56.5 % (ref 38–73)
NRBC BLD-RTO: 0 /100 WBC
OHS CV CPX 1 MINUTE RECOVERY HEART RATE: 121 BPM
OHS CV CPX 85 PERCENT MAX PREDICTED HEART RATE MALE: 120
OHS CV CPX MAX PREDICTED HEART RATE: 141
OHS CV CPX PATIENT IS FEMALE: 0
OHS CV CPX PATIENT IS MALE: 1
OHS CV CPX PEAK DIASTOLIC BLOOD PRESSURE: 76 MMHG
OHS CV CPX PEAK HEAR RATE: 125 BPM
OHS CV CPX PEAK RATE PRESSURE PRODUCT: NORMAL
OHS CV CPX PEAK SYSTOLIC BLOOD PRESSURE: 142 MMHG
OHS CV CPX PERCENT MAX PREDICTED HEART RATE ACHIEVED: 89
OHS CV CPX RATE PRESSURE PRODUCT PRESENTING: 7980
OHS CV INITIAL DOSE: 20 MCG/KG/MIN
OHS CV PEAK DOSE: 30 MCG/KG/MIN
PHOSPHATE SERPL-MCNC: 3 MG/DL (ref 2.7–4.5)
PLATELET # BLD AUTO: 159 K/UL (ref 150–450)
PMV BLD AUTO: 10 FL (ref 9.2–12.9)
POTASSIUM SERPL-SCNC: 4.1 MMOL/L (ref 3.5–5.1)
PROT SERPL-MCNC: 5.8 G/DL (ref 6–8.4)
RBC # BLD AUTO: 4.35 M/UL (ref 4.6–6.2)
SODIUM SERPL-SCNC: 142 MMOL/L (ref 136–145)
SYSTOLIC BLOOD PRESSURE: 140 MMHG
TROPONIN I SERPL HS-MCNC: 5 PG/ML (ref 0–14.9)
WBC # BLD AUTO: 4.14 K/UL (ref 3.9–12.7)

## 2024-08-18 PROCEDURE — 85025 COMPLETE CBC W/AUTO DIFF WBC: CPT | Performed by: NURSE PRACTITIONER

## 2024-08-18 PROCEDURE — 84100 ASSAY OF PHOSPHORUS: CPT | Performed by: NURSE PRACTITIONER

## 2024-08-18 PROCEDURE — 84484 ASSAY OF TROPONIN QUANT: CPT | Performed by: NURSE PRACTITIONER

## 2024-08-18 PROCEDURE — 93351 STRESS TTE COMPLETE: CPT | Mod: 26,,, | Performed by: GENERAL PRACTICE

## 2024-08-18 PROCEDURE — 36415 COLL VENOUS BLD VENIPUNCTURE: CPT | Performed by: NURSE PRACTITIONER

## 2024-08-18 PROCEDURE — 93351 STRESS TTE COMPLETE: CPT

## 2024-08-18 PROCEDURE — 63600175 PHARM REV CODE 636 W HCPCS: Performed by: HOSPITALIST

## 2024-08-18 PROCEDURE — 63600150 PHARM REV CODE 636: Performed by: HOSPITALIST

## 2024-08-18 PROCEDURE — 83735 ASSAY OF MAGNESIUM: CPT | Performed by: NURSE PRACTITIONER

## 2024-08-18 PROCEDURE — 25000003 PHARM REV CODE 250: Performed by: NURSE PRACTITIONER

## 2024-08-18 PROCEDURE — G0378 HOSPITAL OBSERVATION PER HR: HCPCS

## 2024-08-18 PROCEDURE — 80053 COMPREHEN METABOLIC PANEL: CPT | Performed by: NURSE PRACTITIONER

## 2024-08-18 RX ORDER — DOBUTAMINE HYDROCHLORIDE 100 MG/100ML
20 INJECTION INTRAVENOUS CONTINUOUS
Status: DISCONTINUED | OUTPATIENT
Start: 2024-08-18 | End: 2024-08-19 | Stop reason: HOSPADM

## 2024-08-18 RX ORDER — ATROPINE SULFATE 0.1 MG/ML
0.2 INJECTION INTRAVENOUS
Status: DISCONTINUED | OUTPATIENT
Start: 2024-08-18 | End: 2024-08-19 | Stop reason: HOSPADM

## 2024-08-18 RX ADMIN — MULTIVITAMIN TABLET 1 TABLET: TABLET at 09:08

## 2024-08-18 RX ADMIN — ASPIRIN 81 MG: 81 TABLET, COATED ORAL at 09:08

## 2024-08-18 RX ADMIN — DOBUTAMINE HYDROCHLORIDE 20 MCG/KG/MIN: 100 INJECTION INTRAVENOUS at 08:08

## 2024-08-18 RX ADMIN — LOSARTAN POTASSIUM 25 MG: 25 TABLET, FILM COATED ORAL at 09:08

## 2024-08-18 RX ADMIN — GABAPENTIN 300 MG: 300 CAPSULE ORAL at 09:08

## 2024-08-18 RX ADMIN — Medication 400 MG: at 09:08

## 2024-08-18 RX ADMIN — ATROPINE SULFATE 0.2 MG: 0.1 INJECTION INTRAVENOUS at 08:08

## 2024-08-18 NOTE — PLAN OF CARE
08/18/24 1142   MOORE Message   Medicare Outpatient and Observation Notification regarding financial responsibility Given to patient/caregiver;Explained to patient/caregiver;Signed/date by patient/caregiver   Date MOORE was signed 08/18/24   Time MOORE was signed 1000

## 2024-08-18 NOTE — NURSING NOTE
Dobutamine Stress Echo Test completed without complications. Patient verbalized understanding of pre-post test instructions. Discharged from lab per Cardiology.

## 2024-08-18 NOTE — PLAN OF CARE
[facility]  Initial Discharge Assessment       Primary Care Provider: Abdon Schuler MD    Admission Diagnosis: Chest pain [R07.9]    Admission Date: 8/17/2024  Expected Discharge Date:   Pt is a 79-year-old female/male who arrived from home with Chest Pain problem. Information verified as correct on facesheet. The assessment was completed at the patient's bedside.  Pt lives with Wife Meghan Marti - [phone] . Pt does have a Living Will or Advance Directive. The Pt is oriented to person, places, and times. PCP last seen.  Pt denies Coumadin, dialysis, DME, HH, and has not been readmitted into the hospital in the last thirty days.  Pt is capable of performing ADLs without assistance. Pt drives to and from medical appointments. Pt reports he takes medication as prescribed; [facility].  Pt verbalized plan to discharge home via family transport. Pt has no other needs to be addressed at this time. CM reviewed the chart and will continue to monitor.    Transition of Care Barriers: (P) None    Payor: [employer] MANAGED MEDICARE / Plan: [employer] MEDICARE PLAN PPO / Product Type: Medicare Advantage /     Extended Emergency Contact Information  Primary Emergency Contact: Meghan Marti  Address: [address] [place] [address]           [place] [address] [facility]  Home Phone: [phone]  Mobile Phone: [phone]  Relation: Spouse  Preferred language: English   needed? No  Secondary Emergency Contact: Rey Marti   [place]  Mobile Phone: [phone]  Relation: Son    Discharge Plan A: (P) Home  Discharge Plan B: (P) Home with family      [facility] - [place], [place] - [address] [facility]  [address] [facility] [address]  Phone: [phone] Fax: [phone]    [facility] - [place] - [address] [facility]  [address] [facility]  [place] [address]  Phone: [phone] Fax: [phone]      Initial  Assessment (most recent)       Adult Discharge Assessment - 08/18/24 1100          Discharge Assessment    Assessment Type Discharge Planning Assessment (P)      Confirmed/corrected address, phone number and insurance Yes (P)      Confirmed Demographics Correct on Facesheet (P)      Source of Information patient (P)      If unable to respond/provide information was family/caregiver contacted? Yes (P)      When was your last doctors appointment? -- (P)    6 mos ago    Does patient/caregiver understand observation status Yes (P)      Reason For Admission Chest Pain (P)      People in Home spouse (P)    Meghan [phone]    Do you expect to return to your current living situation? Yes (P)      Do you have help at home or someone to help you manage your care at home? Yes (P)      Who are your caregiver(s) and their phone number(s)? Wife Meghan Huber [phone] (P)      Prior to hospitilization cognitive status: Alert/Oriented (P)      Current cognitive status: Alert/Oriented (P)      Walking or Climbing Stairs Difficulty no (P)      Dressing/Bathing Difficulty no (P)      Home Accessibility not wheelchair accessible (P)      Home Layout Able to live on 1st floor (P)      Equipment Currently Used at Home none (P)      Readmission within 30 days? No (P)      Patient currently being followed by outpatient case management? No (P)      Do you currently have service(s) that help you manage your care at home? No (P)      Do you take prescription medications? Yes (P)      Do you have prescription coverage? Yes (P)      Coverage [employer] Managed Medicare (P)      Do you have any problems affording any of your prescribed medications? No (P)      Is the patient taking medications as prescribed? yes (P)      Who is going to help you get home at discharge? [facility] on [address] (P)      How do you get to doctors appointments? car, drives self (P)      Are you on dialysis? No (P)      Do you take coumadin? No (P)       Discharge Plan A Home (P)      Discharge Plan B Home with family (P)      DME Needed Upon Discharge  none (P)      Discharge Plan discussed with: Parent(s);Spouse/sig other (P)      Name(s) and Number(s) Wife Meghan Huber [phone] (P)      Transition of Care Barriers None (P)         Physical Activity    On average, how many days per week do you engage in moderate to strenuous exercise (like a brisk walk)? 2 days (P)      On average, how many minutes do you engage in exercise at this level? 150+ min (P)         Financial Resource Strain    How hard is it for you to pay for the very basics like food, housing, medical care, and heating? Not hard at all (P)         Housing Stability    In the last 12 months, was there a time when you were not able to pay the mortgage or rent on time? No (P)      At any time in the past 12 months, were you homeless or living in a shelter (including now)? No (P)         Transportation Needs    Has the lack of transportation kept you from medical appointments, meetings, work or from getting things needed for daily living? No (P)         Food Insecurity    Within the past 12 months, you worried that your food would run out before you got the money to buy more. Never true (P)      Within the past 12 months, the food you bought just didn't last and you didn't have money to get more. Never true (P)         Stress    Do you feel stress - tense, restless, nervous, or anxious, or unable to sleep at night because your mind is troubled all the time - these days? Not at all (P)         Social Isolation    How often do you feel lonely or isolated from those around you?  Never (P)         Alcohol Use    Q1: How often do you have a drink containing alcohol? 4 or more times a week (P)      Q2: How many drinks containing alcohol do you have on a typical day when you are drinking? 1 or 2 (P)      Q3: How often do you have six or more drinks on one occasion? Never (P)         Utilities    In  the past 12 months has the electric, gas, oil, or water company threatened to shut off services in your home? No (P)         Health Literacy    How often do you need to have someone help you when you read instructions, pamphlets, or other written material from your doctor or pharmacy? Never (P)         OTHER    Name(s) of People in Home Latrice Christianson [facility] [phone] (P)

## 2024-08-18 NOTE — DISCHARGE SUMMARY
[facility]  Discharge Summary      Patient Name: Jose Marti  MRN: [id number]  TORSTEN: [id number]  Patient Class: OP- Observation  Admission Date: 8/17/2024  Hospital Length of Stay: 0 days  Discharge Date and Time:  08/18/2024 11:53 AM  Attending Physician: Ravi Barton MD   Discharging Provider: Latasha Michelle NP  Primary Care Provider: Abdon Schuler MD    Primary Care Team: Networked reference to record PCT     HPI:   79-year-old male with a past medical history of hypertension, hyperlipidemia, pulmonary hypertension, GERD, angina, coronary artery disease status post CABG x2 in 2017 who presents to the emergency room with reports of chest pain that started in our ago, lasted 40 minutes and resolved spontaneously.  He describes it as heaviness in the chest, nonradiating, with some shortness for breath it began while he was watching television.  He reports mowing the lawn earlier that day and then taking a shower without symptoms.  He reports taking aspirin 81 mg today.  He denies nausea vomiting diaphoresis.  In the ER CBC stable CMP with T bili 2.8 otherwise stable, BNP 57, troponin I high sensitivity 4.7 EKG with Sinus bradycardia 55 beats per minute normal axis no ST elevation or depression or T-wave inversion, chest x-ray nonacute.  Due to patient's high-risk cardiac factors, we will admit to hospital medicine and get stress echo in the a.m. Patient's cardiologist is Dr. Dominguez.        * No surgery found *      Hospital Course:   Mr. Marti was admitted for acute chest pain.  While here, his labs and vital signs were monitored and he was maintained on telemetry.  His troponin was trended and remained negative.  He underwent a dobutamine stress echo which was negative for ischemia and showed an EF of 55-60%.  He has not had any chest pain or symptoms of ACS since being in the hospital.  He is being discharged to home today.  He will follow up with his PCP and his  cardiologist.  He did not have any changes or additions to to his chronic medications.  He did not have any adverse events while here.     Goals of Care Treatment Preferences:  Code Status: Full Code      SDOH Screening:  The patient was screened for utility difficulties, food insecurity, transport difficulties, housing insecurity, and interpersonal safety and there were no concerns identified this admission.     Consults:     No new Assessment & Plan notes have been filed under this hospital service since the last note was generated.  Service: Hospital Medicine    Final Active Diagnoses:    Diagnosis Date Noted POA    PRINCIPAL PROBLEM:  Chest pain [R07.9] 08/17/2024 Yes    History of coronary artery bypass graft x 2 [Z95.1] 01/30/2020 Not Applicable    Atherosclerotic heart disease of native coronary artery with other forms of angina pectoris [I25.118] 07/10/2017 Yes    Essential (primary) hypertension [I10] 02/13/2017 Yes      Problems Resolved During this Admission:       Discharged Condition: stable    Disposition: Home or Self Care    Follow Up:   Follow-up Information       Abdon Schuler MD Follow up in 2 week(s).    Specialties: Family Medicine, Home Health Services, Hospice Services  Contact information:  [address] [facility]  [address]  [facility] [address]  [phone]               Corwin Dominguez MD Follow up in 2 week(s).    Specialties: Interventional Cardiology, Cardiology  Contact information:  [address] [facility] [address]  [facility]  [facility] [address]  [phone]                           Patient Instructions:      Diet Cardiac     Notify your health care provider if you experience any of the following:  increased confusion or weakness     Notify your health care provider if you experience any of the following:  persistent dizziness, light-headedness, or visual disturbances     Notify your health care provider if you experience any of the following:  severe persistent headache     Notify  your health care provider if you experience any of the following:  difficulty breathing or increased cough     Notify your health care provider if you experience any of the following:  severe uncontrolled pain     Notify your health care provider if you experience any of the following:  persistent nausea and vomiting or diarrhea     Activity as tolerated       Significant Diagnostic Studies: Labs: CMP   Recent Labs   Lab 08/17/24 1256 08/18/24 0224    142   K 4.2 4.1    110   CO2 24 26   * 115*   BUN 21 20   CREATININE 1.2 1.0   CALCIUM 9.6 8.9   PROT 6.8 5.8*   ALBUMIN 4.3 3.7   BILITOT 2.8* 1.7*   ALKPHOS 54* 50*   AST 24 19   ALT 18 13   ANIONGAP 9 6*   , CBC   Recent Labs   Lab 08/17/24 1256 08/18/24 0224   WBC 4.70 4.14   HGB 13.6* 13.1*   HCT 40.2 39.7*    159   , Lipid Panel   Lab Results   Component Value Date    CHOL 123 08/09/2024    HDL 52 08/09/2024    LDLCALC 51 08/09/2024    TRIG 113 08/09/2024    CHOLHDL 2.4 08/09/2024   ,   Recent Labs   Lab 08/18/24 0224   TROPONINIHS 5.0      , and All labs within the past 24 hours have been reviewed  Radiology: Stress Echo Which stress agent will be used? Pharmacological; Color Flow Doppler? No  Narrative:   Left Ventricle: The left ventricle is normal in size. Normal wall   thickness. There is normal systolic function with a visually estimated   ejection fraction of 55 - 60%.    Stress Protocol: The patient was infused intravenously with dobutamine.   The patient received a graduated infusion of the stress agent beginning at   20.0 mcg/kg/min to a peak dose of 30.0 mcg/kg/min. The peak heart rate was   125.0 bpm, which is 89% of age predicted maximum heart rate. The patient   was also given atropine for a total dose of 0.4mg mg. The patient reported   no symptoms during the stress test. The test was stopped because the end   of the protocol was reached.    Baseline ECG: The Baseline ECG reveals sinus rhythm. The axis is   normal.     Stress ECG: There are no ST segment deviation identified during the   protocol. There are no arrhythmias during stress. There is normal blood   pressure response with stress.    ECG Conclusion: The ECG portion of the study is negative for ischemia.    Post-stress Echo: The left ventricle systolic function is hyperdynamic.    Post-stress   Impression:  The study is normal and negative with no   echocardiographic evidence of stress induced ischemia.        Pending Diagnostic Studies:       None           Medications:  Reconciled Home Medications:      Medication List        CHANGE how you take these medications      atenoloL 25 MG tablet  Commonly known as: TENORMIN  TAKE 1 TABLET DAILY  What changed: when to take this            CONTINUE taking these medications      aspirin 81 MG EC tablet  Commonly known as: ECOTRIN  Take 81 mg by mouth once daily.     calcium carbonate-vitamin D3 600 mg-25 mcg (1,000 unit) Tab  Take 1 tablet by mouth once daily.     cinnamon bark 500 mg capsule  Take 1,000 mg by mouth once daily.     coenzyme Q10 200 mg capsule  Take 1,200 mg by mouth 2 (two) times daily.     gabapentin 300 MG capsule  Commonly known as: NEURONTIN  Take 1 capsule (300 mg total) by mouth 2 (two) times daily.     glucosamine-D3-Boswellia serr 1,500-400-100 mg-unit-mg Tab  Take 1 tablet by mouth once daily.     losartan 25 MG tablet  Commonly known as: COZAAR  Take 1 tablet (25 mg total) by mouth once daily.     magnesium oxide 400 mg (241.3 mg magnesium) tablet  Commonly known as: MAG-OX  Take 400 mg by mouth once daily.     meloxicam 15 MG tablet  Commonly known as: MOBIC  Take 15 mg by mouth once daily.     multivitamin per tablet  Commonly known as: THERAGRAN  Take 1 tablet by mouth once daily.     omega 3-dha-epa-fish oil 1,000 mg (120 mg-180 mg) Cap  Take 1 capsule by mouth 2 (two) times a day.     omeprazole 20 MG capsule  Commonly known as: PRILOSEC  Take 1 capsule (20 mg total) by mouth once daily.      rosuvastatin 20 MG tablet  Commonly known as: CRESTOR  TAKE 1 TABLET NIGHTLY     tamsulosin 0.4 mg Cap  Commonly known as: FLOMAX  Take 2 capsules (0.8 mg total) by mouth once daily.              Indwelling Lines/Drains at time of discharge:   Lines/Drains/Airways       None                   Time spent on the discharge of patient: 35 minutes         Latasha Michelle NP  Department of Hospital Medicine  [facility]

## 2024-08-18 NOTE — HOSPITAL COURSE
Mr. Marti was admitted for acute chest pain.  While here, his labs and vital signs were monitored and he was maintained on telemetry.  His troponin was trended and remained negative.  He underwent a dobutamine stress echo which was negative for ischemia and showed an EF of 55-60%.  He has not had any chest pain or symptoms of ACS since being in the hospital.  He is being discharged to home today.  He will follow up with his PCP and his cardiologist.  He did not have any changes or additions to to his chronic medications.  He did not have any adverse events while here.

## 2024-08-18 NOTE — PLAN OF CARE
08/18/24 1158   Final Note   Assessment Type Final Discharge Note   Anticipated Discharge Disposition Home   What phone number can be called within the next 1-3 days to see how you are doing after discharge? [phone]   Post-Acute Status   Home Health Status Set-up Complete/Auth obtained   Coverage [employer] Managed Medicare   Discharge Delays None known at this time     Patient cleared for discharge from case management standpoint.    Follow up appointments scheduled and added to AVS.    Chart and discharge orders reviewed.  Patient discharged home with no further case management needs.

## 2024-08-19 ENCOUNTER — OFFICE VISIT (OUTPATIENT)
Dept: FAMILY MEDICINE | Facility: CLINIC | Age: 79
End: 2024-08-19
Payer: MEDICARE

## 2024-08-19 VITALS
HEART RATE: 76 BPM | HEIGHT: 73 IN | WEIGHT: 210 LBS | BODY MASS INDEX: 27.83 KG/M2 | DIASTOLIC BLOOD PRESSURE: 74 MMHG | SYSTOLIC BLOOD PRESSURE: 120 MMHG

## 2024-08-19 DIAGNOSIS — N40.1 BPH WITH OBSTRUCTION/LOWER URINARY TRACT SYMPTOMS: ICD-10-CM

## 2024-08-19 DIAGNOSIS — R07.9 CHEST PAIN, UNSPECIFIED TYPE: Primary | ICD-10-CM

## 2024-08-19 DIAGNOSIS — E78.2 MIXED HYPERLIPIDEMIA: ICD-10-CM

## 2024-08-19 DIAGNOSIS — I25.118 ATHEROSCLEROTIC HEART DISEASE OF NATIVE CORONARY ARTERY WITH OTHER FORMS OF ANGINA PECTORIS: ICD-10-CM

## 2024-08-19 DIAGNOSIS — M51.16 LUMBAR DISC DISEASE WITH RADICULOPATHY: ICD-10-CM

## 2024-08-19 DIAGNOSIS — K21.9 GASTRO-ESOPHAGEAL REFLUX DISEASE WITHOUT ESOPHAGITIS: ICD-10-CM

## 2024-08-19 DIAGNOSIS — I10 ESSENTIAL (PRIMARY) HYPERTENSION: ICD-10-CM

## 2024-08-19 DIAGNOSIS — N13.8 BPH WITH OBSTRUCTION/LOWER URINARY TRACT SYMPTOMS: ICD-10-CM

## 2024-08-19 DIAGNOSIS — M17.0 PRIMARY OSTEOARTHRITIS OF BOTH KNEES: ICD-10-CM

## 2024-08-19 DIAGNOSIS — Z95.1 HISTORY OF CORONARY ARTERY BYPASS GRAFT X 2: ICD-10-CM

## 2024-08-19 PROCEDURE — 1125F AMNT PAIN NOTED PAIN PRSNT: CPT | Mod: CPTII,S$GLB,, | Performed by: FAMILY MEDICINE

## 2024-08-19 PROCEDURE — 99214 OFFICE O/P EST MOD 30 MIN: CPT | Mod: S$GLB,,, | Performed by: FAMILY MEDICINE

## 2024-08-19 PROCEDURE — 1101F PT FALLS ASSESS-DOCD LE1/YR: CPT | Mod: CPTII,S$GLB,, | Performed by: FAMILY MEDICINE

## 2024-08-19 PROCEDURE — 3288F FALL RISK ASSESSMENT DOCD: CPT | Mod: CPTII,S$GLB,, | Performed by: FAMILY MEDICINE

## 2024-08-19 PROCEDURE — 3074F SYST BP LT 130 MM HG: CPT | Mod: CPTII,S$GLB,, | Performed by: FAMILY MEDICINE

## 2024-08-19 PROCEDURE — 3078F DIAST BP <80 MM HG: CPT | Mod: CPTII,S$GLB,, | Performed by: FAMILY MEDICINE

## 2024-08-20 ENCOUNTER — TELEPHONE (OUTPATIENT)
Dept: FAMILY MEDICINE | Facility: CLINIC | Age: 79
End: 2024-08-20
Payer: MEDICARE

## 2024-08-20 NOTE — PROGRESS NOTES
SUBJECTIVE:    Patient ID: Jose Marti is a 79 y.o. male.    Chief Complaint: Follow-up (Brought med list, pt fell 2 month ago in the [place], review Lab-results, low back stiffness, discomfort, abc )    79-year-old male had a recent hospital ER admission.  He was having chest pain that he describes as a heavy substernal pressure.  He had mow the lawn taking a shower cold off and was resting before lunch.  He had not eaten breakfast that day.  In the ER his troponins were negative for acute MI he was admitted and a dobutamine stress test was done the following day.  He was negative for ischemia.  Ejection fraction 55-60%.    History of two-vessel CABG, no stents.  Takes aspirin 81 mg daily    He is upset about constant bleeding and bruising.  He is skin is easy to have skin tears.  Currently on meloxicam 15 mg daily for lumbar disc disease and a knee arthritis.    GERD-controlled with omeprazole 20 mg daily    Lumbar disc disease with sciatica-improved with epidural steroid injections per Dr. Claire    Knee osteoarthritis, knee injections every 3 months are helpful to reduce the pain from Dr. 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 Pertinent negatives include no arthralgias, headaches, joint swelling, neck pain, vomiting or weakness.       Admission on 08/17/2024, Discharged on 08/18/2024   Component Date Value Ref Range Status    Magnesium 08/17/2024 2.2  1.6 - 2.6 mg/dL Final    QRS Duration 08/17/2024 106  ms Final    [facility] QTC Calculation 08/17/2024 386  ms Final    WBC 08/17/2024 4.70  3.90 - 12.70 K/uL Final    RBC 08/17/2024 4.46 (L)  4.60 - 6.20 M/uL Final    Hemoglobin 08/17/2024 13.6 (L)  14.0 - 18.0 g/dL Final    Hematocrit 08/17/2024 40.2  40.0 - 54.0 % Final    MCV 08/17/2024 90  82 - 98 fL Final    MCH 08/17/2024 30.5  27.0 - 31.0 pg Final    MCHC 08/17/2024 33.8  32.0 - 36.0 g/dL Final    RDW 08/17/2024 13.6  11.5  - 14.5 % Final    Platelets 08/17/2024 167  150 - 450 K/uL Final    MPV 08/17/2024 10.0  9.2 - 12.9 fL Final    Immature Granulocytes 08/17/2024 0.2  0.0 - 0.5 % Final    Gran # (ANC) 08/17/2024 3.6  1.8 - 7.7 K/uL Final    Immature Grans (Abs) 08/17/2024 0.01  0.00 - 0.04 K/uL Final    Lymph # 08/17/2024 0.7 (L)  1.0 - 4.8 K/uL Final    Mono # 08/17/2024 0.4  0.3 - 1.0 K/uL Final    Eos # 08/17/2024 0.0  0.0 - 0.5 K/uL Final    Baso # 08/17/2024 0.00  0.00 - 0.20 K/uL Final    nRBC 08/17/2024 0  0 /100 WBC Final    Gran % 08/17/2024 77.0 (H)  38.0 - 73.0 % Final    Lymph % 08/17/2024 14.3 (L)  18.0 - 48.0 % Final    Mono % 08/17/2024 8.1  4.0 - 15.0 % Final    Eosinophil % 08/17/2024 0.4  0.0 - 8.0 % Final    Basophil % 08/17/2024 0.0  0.0 - 1.9 % Final    Differential Method 08/17/2024 Automated   Final    Sodium 08/17/2024 140  136 - 145 mmol/L Final    Potassium 08/17/2024 4.2  3.5 - 5.1 mmol/L Final    Chloride 08/17/2024 107  95 - 110 mmol/L Final    CO2 08/17/2024 24  23 - 29 mmol/L Final    Glucose 08/17/2024 111 (H)  70 - 110 mg/dL Final    BUN 08/17/2024 21  8 - 23 mg/dL Final    Creatinine 08/17/2024 1.2  0.5 - 1.4 mg/dL Final    Calcium 08/17/2024 9.6  8.7 - 10.5 mg/dL Final    Total Protein 08/17/2024 6.8  6.0 - 8.4 g/dL Final    Albumin 08/17/2024 4.3  3.5 - 5.2 g/dL Final    Total Bilirubin 08/17/2024 2.8 (H)  0.1 - 1.0 mg/dL Final    Alkaline Phosphatase 08/17/2024 54 (L)  55 - 135 U/L Final    AST 08/17/2024 24  10 - 40 U/L Final    ALT 08/17/2024 18  10 - 44 U/L Final    eGFR 08/17/2024 >60.0  >60 mL/min/1.73 m^2 Final    Anion Gap 08/17/2024 9  8 - 16 mmol/L Final    Troponin I High Sensitivity 08/17/2024 4.7  0.0 - 14.9 pg/mL Final    Troponin I High Sensitivity 08/17/2024 4.2  0.0 - 14.9 pg/mL Final    BNP 08/17/2024 57  0 - 99 pg/mL Final    BSA 08/18/2024 2.2  m2 Final    85% Max Predicted HR 08/18/2024 120   Final    Max Predicted HR 08/18/2024 141   Final    OHS CV CPX PATIENT IS MALE  08/18/2024 1.0   Final    OHS CV CPX PATIENT IS FEMALE 08/18/2024 0.0   Final    HR at rest 08/18/2024 57  bpm Final    Systolic blood pressure 08/18/2024 140  mmHg Final    Diastolic blood pressure 08/18/2024 80  mmHg Final    RPP 08/18/2024 7,980   Final    Peak HR 08/18/2024 125.0  bpm Final    Peak Systolic BP 08/18/2024 142  mmHg Final    Peak Diatolic BP 08/18/2024 76  mmHg Final    Peak RPP 08/18/2024 17,750   Final    % Max HR Achieved 08/18/2024 89   Final    1 Minute Recovery HR 08/18/2024 121  bpm Final    dose 08/18/2024 20.0  mcg/kg/min Final    dose 08/18/2024 30.0  mcg/kg/min Final    Troponin I High Sensitivity 08/17/2024 5.0  0.0 - 14.9 pg/mL Final    Troponin I High Sensitivity 08/17/2024 5.1  0.0 - 14.9 pg/mL Final    Troponin I High Sensitivity 08/18/2024 5.0  0.0 - 14.9 pg/mL Final    WBC 08/18/2024 4.14  3.90 - 12.70 K/uL Final    RBC 08/18/2024 4.35 (L)  4.60 - 6.20 M/uL Final    Hemoglobin 08/18/2024 13.1 (L)  14.0 - 18.0 g/dL Final    Hematocrit 08/18/2024 39.7 (L)  40.0 - 54.0 % Final    MCV 08/18/2024 91  82 - 98 fL Final    MCH 08/18/2024 30.1  27.0 - 31.0 pg Final    MCHC 08/18/2024 33.0  32.0 - 36.0 g/dL Final    RDW 08/18/2024 13.7  11.5 - 14.5 % Final    Platelets 08/18/2024 159  150 - 450 K/uL Final    MPV 08/18/2024 10.0  9.2 - 12.9 fL Final    Immature Granulocytes 08/18/2024 0.7 (H)  0.0 - 0.5 % Final    Gran # (ANC) 08/18/2024 2.3  1.8 - 7.7 K/uL Final    Immature Grans (Abs) 08/18/2024 0.03  0.00 - 0.04 K/uL Final    Lymph # 08/18/2024 1.3  1.0 - 4.8 K/uL Final    Mono # 08/18/2024 0.4  0.3 - 1.0 K/uL Final    Eos # 08/18/2024 0.1  0.0 - 0.5 K/uL Final    Baso # 08/18/2024 0.00  0.00 - 0.20 K/uL Final    nRBC 08/18/2024 0  0 /100 WBC Final    Gran % 08/18/2024 56.5  38.0 - 73.0 % Final    Lymph % 08/18/2024 30.9  18.0 - 48.0 % Final    Mono % 08/18/2024 9.7  4.0 - 15.0 % Final    Eosinophil % 08/18/2024 2.2  0.0 - 8.0 % Final    Basophil % 08/18/2024 0.0  0.0 - 1.9 % Final     Differential Method 08/18/2024 Automated   Final    Magnesium 08/18/2024 2.1  1.6 - 2.6 mg/dL Final    Sodium 08/18/2024 142  136 - 145 mmol/L Final    Potassium 08/18/2024 4.1  3.5 - 5.1 mmol/L Final    Chloride 08/18/2024 110  95 - 110 mmol/L Final    CO2 08/18/2024 26  23 - 29 mmol/L Final    Glucose 08/18/2024 115 (H)  70 - 110 mg/dL Final    BUN 08/18/2024 20  8 - 23 mg/dL Final    Creatinine 08/18/2024 1.0  0.5 - 1.4 mg/dL Final    Calcium 08/18/2024 8.9  8.7 - 10.5 mg/dL Final    Total Protein 08/18/2024 5.8 (L)  6.0 - 8.4 g/dL Final    Albumin 08/18/2024 3.7  3.5 - 5.2 g/dL Final    Total Bilirubin 08/18/2024 1.7 (H)  0.1 - 1.0 mg/dL Final    Alkaline Phosphatase 08/18/2024 50 (L)  55 - 135 U/L Final    AST 08/18/2024 19  10 - 40 U/L Final    ALT 08/18/2024 13  10 - 44 U/L Final    eGFR 08/18/2024 >60.0  >60 mL/min/1.73 m^2 Final    Anion Gap 08/18/2024 6 (L)  8 - 16 mmol/L Final    Phosphorus 08/18/2024 3.0  2.7 - 4.5 mg/dL Final       No past medical history on file.  Social History     Socioeconomic History    Marital status:    Tobacco Use    Smoking status: Never    Smokeless tobacco: Never   Substance and Sexual Activity    Alcohol use: Yes    Drug use: Never    Sexual activity: Yes     Partners: Female     Social Determinants of Health     Financial Resource Strain: Low Risk  (8/18/2024)    Overall Financial Resource Strain (CARDIA)     Difficulty of Paying Living Expenses: Not hard at all   Food Insecurity: No Food Insecurity (8/18/2024)    Hunger Vital Sign     Worried About Running Out of Food in the Last Year: Never true     Ran Out of Food in the Last Year: Never true   Transportation Needs: No Transportation Needs (8/18/2024)    TRANSPORTATION NEEDS     Transportation : No   Physical Activity: Sufficiently Active (8/18/2024)    Exercise Vital Sign     Days of Exercise per Week: 2 days     Minutes of Exercise per Session: 150+ min   Stress: No Stress Concern Present (8/18/2024)    [employer]  [place] [employer] - Occupational Stress Questionnaire     Feeling of Stress : Not at all   Housing Stability: Low Risk  (8/18/2024)    Housing Stability Vital Sign     Unable to Pay for Housing in the Last Year: No     Homeless in the Last Year: No     Past Surgical History:   Procedure Laterality Date    BACK SURGERY  10/2018    Dr Menon-Lumbar  fusion L3-L5    CARDIAC SURGERY  2017    Dr MARK [facility]- 2 vessel CABG    COLONOSCOPY  11/18/2013    Dr Ramos DUE 11/18/2018    GALLBLADDER SURGERY      TONSILLECTOMY       No family history on file.    The CVD Risk score (D'Agostino, et al., 2008) failed to calculate for the following reasons:    The 2008 CVD risk score is only valid for ages 30 to 74    All of your core healthy metrics are met.      Review of patient's allergies indicates:   Allergen Reactions    Metoprolol succinate Hives     Broke out in hives        Current Outpatient Medications:     aspirin (ECOTRIN) 81 MG EC tablet, Take 81 mg by mouth once daily., Disp: , Rfl:     atenoloL (TENORMIN) 25 MG tablet, TAKE 1 TABLET DAILY (Patient taking differently: Take 25 mg by mouth once daily.), Disp: 90 tablet, Rfl: 3    calcium carbonate-vitamin D3 600mg (1,000mg) -1,000 unit Tab, Take 1 tablet by mouth once daily., Disp: , Rfl:     cinnamon bark 500 mg capsule, Take 1,000 mg by mouth once daily., Disp: , Rfl:     coenzyme Q10 200 mg capsule, Take 1,200 mg by mouth 2 (two) times daily., Disp: , Rfl:     glucosamine-D3-Boswellia serr 1,500-400-100 mg-unit-mg Tab, Take 1 tablet by mouth once daily., Disp: , Rfl:     losartan (COZAAR) 25 MG tablet, Take 1 tablet (25 mg total) by mouth once daily., Disp: 90 tablet, Rfl: 3    magnesium oxide (MAG-OX) 400 mg (241.3 mg magnesium) tablet, Take 400 mg by mouth once daily., Disp: , Rfl:     meloxicam (MOBIC) 15 MG tablet, Take 15 mg by mouth once daily., Disp: , Rfl:     multivitamin (THERAGRAN) per tablet, Take 1 tablet by mouth once daily., Disp: ,  "Rfl:     omega 3-dha-epa-fish oil 1,000 mg (120 mg-180 mg) Cap, Take 1 capsule by mouth 2 (two) times a day., Disp: , Rfl:     rosuvastatin (CRESTOR) 20 MG tablet, TAKE 1 TABLET NIGHTLY (Patient taking differently: Take 20 mg by mouth every evening.), Disp: 90 tablet, Rfl: 3    tamsulosin (FLOMAX) 0.4 mg Cap, Take 2 capsules (0.8 mg total) by mouth once daily., Disp: 180 capsule, Rfl: 1    gabapentin (NEURONTIN) 300 MG capsule, Take 1 capsule (300 mg total) by mouth 2 (two) times daily., Disp: 60 capsule, Rfl: 5    omeprazole (PRILOSEC) 20 MG capsule, Take 1 capsule (20 mg total) by mouth once daily., Disp: 90 capsule, Rfl: 3  No current facility-administered medications for this visit.    Review of Systems   Constitutional:  Negative for activity change and unexpected weight change.   HENT:  Negative for hearing loss, rhinorrhea and trouble swallowing.    Eyes:  Negative for discharge and visual disturbance.   Respiratory:  Negative for chest tightness and wheezing.    Cardiovascular:  Positive for chest pain. Negative for palpitations.   Gastrointestinal:  Negative for blood in stool, constipation, diarrhea and vomiting.   Endocrine: Negative for polydipsia and polyuria.   Genitourinary:  Positive for urgency. Negative for difficulty urinating and hematuria.   Musculoskeletal:  Negative for arthralgias, joint swelling and neck pain.   Neurological:  Negative for weakness and headaches.   Psychiatric/Behavioral:  Negative for confusion and dysphoric mood.            Objective:      Vitals:    08/19/24 0951   BP: 120/74   Pulse: 76   Weight: 95.3 kg (210 lb)   Height: 6' 0.5" (1.842 m)     Physical Exam  Vitals and nursing note reviewed.   Constitutional:       General: He is not in acute distress.     Appearance: Normal appearance. He is well-developed. He is not toxic-appearing.   HENT:      Head: Normocephalic and atraumatic.      Right Ear: Tympanic membrane and external ear normal.      Left Ear: Tympanic " membrane and external ear normal.      Nose: Nose normal.      Mouth/Throat:      Pharynx: Oropharynx is clear.   Eyes:      Pupils: Pupils are equal, round, and reactive to light.   Neck:      Thyroid: No thyromegaly.      Vascular: No carotid bruit.   Cardiovascular:      Rate and Rhythm: Normal rate and regular rhythm.      Heart sounds: Normal heart sounds. No murmur heard.  Pulmonary:      Effort: Pulmonary effort is normal.      Breath sounds: Normal breath sounds. No wheezing or rales.   Abdominal:      General: Bowel sounds are normal. There is no distension.      Palpations: Abdomen is soft.      Tenderness: There is no abdominal tenderness.   Musculoskeletal:         General: No tenderness or deformity. Normal range of motion.      Cervical back: Normal range of motion and neck supple.      Lumbar back: Normal. No spasms.      Comments: Bends 60 degrees at  waist, tender lumbar muscles to palpation, knees are crepitant bilaterally.  No effusions noted today.  No pitting edema to lower extremities   Lymphadenopathy:      Cervical: No cervical adenopathy.   Skin:     General: Skin is warm and dry.      Findings: No rash.   Neurological:      Mental Status: He is alert and oriented to person, place, and time.      Cranial Nerves: No cranial nerve deficit.      Coordination: Coordination normal.      Gait: Gait normal.   Psychiatric:         Mood and Affect: Mood normal.         Behavior: Behavior normal.         Thought Content: Thought content normal.         Judgment: Judgment normal.           Assessment:       1. Chest pain, unspecified type    2. Lumbar disc disease with radiculopathy    3. Mixed hyperlipidemia    4. History of coronary artery bypass graft x 2    5. Essential (primary) hypertension    6. 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 BPH with obstruction/lower urinary tract symptoms    8. Gastro-esophageal reflux disease without  esophagitis    9. Primary osteoarthritis of both knees         Plan:       Chest pain, unspecified type  Chest pain turned out to be noncardiac.  It could be esophageal pain related to his meloxicam use.  recommend patient reduce meloxicam to 3 times a week if tolerated  Lumbar disc disease with radiculopathy  Epidural steroids with Dr. Claire have been effective  Mixed hyperlipidemia  Cholesterol 123 HDL 52  LDL 51 excellent lipid panel continue statin drugs  History of coronary artery bypass graft x 2  No ischemia found on stress test  Essential (primary) hypertension  Blood pressure well controlled  Atherosclerotic heart disease of native coronary artery with other forms of angina pectoris    BPH with obstruction/lower urinary tract symptoms    Gastro-esophageal reflux disease without esophagitis  Continue omeprazole daily  Primary osteoarthritis of both knees  Reduce meloxicam to 15 mg 3 times a week.  Continue aspirin 81 mg daily    Follow up in about 6 months (around 2/19/2025), or cad.        8/19/2024 Abdon Schuler

## 2024-08-20 NOTE — TELEPHONE ENCOUNTER
----- Message from Sarah Sheets LPN sent at 8/19/2024  3:55 PM CDT -----  Regarding: HFU  Call patient - needs post-hospital phone call within 2 business days and hospital follow up visit scheduled within 7-14 days.

## 2024-09-02 ENCOUNTER — PATIENT MESSAGE (OUTPATIENT)
Dept: FAMILY MEDICINE | Facility: CLINIC | Age: 79
End: 2024-09-02
Payer: MEDICARE

## 2024-09-02 DIAGNOSIS — K21.9 GASTRO-ESOPHAGEAL REFLUX DISEASE WITHOUT ESOPHAGITIS: ICD-10-CM

## 2024-09-03 RX ORDER — OMEPRAZOLE 20 MG/1
20 CAPSULE, DELAYED RELEASE ORAL DAILY
Qty: 90 CAPSULE | Refills: 3 | Status: SHIPPED | OUTPATIENT
Start: 2024-09-03 | End: 2025-09-03

## 2024-09-05 ENCOUNTER — PATIENT OUTREACH (OUTPATIENT)
Dept: ADMINISTRATIVE | Facility: HOSPITAL | Age: 79
End: 2024-09-05
Payer: MEDICARE

## 2024-09-05 NOTE — PROGRESS NOTES
Population Health Chart Review & Patient Outreach Details      Additional [facility] Health Notes:               Updates Requested / Reviewed:      Updated Care Coordination Note         Health Maintenance Topics Overdue:      VBHM Score: 0     Patient is not due for any topics at this time.    Influenza Vaccine  Pneumonia Vaccine  Tetanus Vaccine                  Health Maintenance Topic(s) Outreach Outcomes & Actions Taken:    Eye Exam - Outreach Outcomes & Actions Taken  : Non-Diabetic Eye External Records Uploaded, Care Team & History Updated if Applicable

## 2024-09-12 ENCOUNTER — OFFICE VISIT (OUTPATIENT)
Dept: CARDIOLOGY | Facility: CLINIC | Age: 79
End: 2024-09-12
Payer: MEDICARE

## 2024-09-12 VITALS
SYSTOLIC BLOOD PRESSURE: 122 MMHG | BODY MASS INDEX: 27.17 KG/M2 | HEART RATE: 53 BPM | OXYGEN SATURATION: 99 % | RESPIRATION RATE: 16 BRPM | HEIGHT: 73 IN | DIASTOLIC BLOOD PRESSURE: 76 MMHG | WEIGHT: 205 LBS

## 2024-09-12 DIAGNOSIS — R07.89 OTHER CHEST PAIN: Primary | ICD-10-CM

## 2024-09-12 DIAGNOSIS — E78.2 MIXED HYPERLIPIDEMIA: ICD-10-CM

## 2024-09-12 DIAGNOSIS — Z95.1 HISTORY OF CORONARY ARTERY BYPASS GRAFT X 2: ICD-10-CM

## 2024-09-12 DIAGNOSIS — I10 ESSENTIAL (PRIMARY) HYPERTENSION: ICD-10-CM

## 2024-09-12 DIAGNOSIS — K21.9 GASTRO-ESOPHAGEAL REFLUX DISEASE WITHOUT ESOPHAGITIS: ICD-10-CM

## 2024-09-12 PROCEDURE — 1101F PT FALLS ASSESS-DOCD LE1/YR: CPT | Mod: CPTII,S$GLB,, | Performed by: INTERNAL MEDICINE

## 2024-09-12 PROCEDURE — 3078F DIAST BP <80 MM HG: CPT | Mod: CPTII,S$GLB,, | Performed by: INTERNAL MEDICINE

## 2024-09-12 PROCEDURE — 1126F AMNT PAIN NOTED NONE PRSNT: CPT | Mod: CPTII,S$GLB,, | Performed by: INTERNAL MEDICINE

## 2024-09-12 PROCEDURE — 99215 OFFICE O/P EST HI 40 MIN: CPT | Mod: S$GLB,,, | Performed by: INTERNAL MEDICINE

## 2024-09-12 PROCEDURE — 3074F SYST BP LT 130 MM HG: CPT | Mod: CPTII,S$GLB,, | Performed by: INTERNAL MEDICINE

## 2024-09-12 PROCEDURE — 99999 PR PBB SHADOW E&M-EST. PATIENT-LVL III: CPT | Mod: PBBFAC,,, | Performed by: INTERNAL MEDICINE

## 2024-09-12 PROCEDURE — 1159F MED LIST DOCD IN RCRD: CPT | Mod: CPTII,S$GLB,, | Performed by: INTERNAL MEDICINE

## 2024-09-12 PROCEDURE — 3288F FALL RISK ASSESSMENT DOCD: CPT | Mod: CPTII,S$GLB,, | Performed by: INTERNAL MEDICINE

## 2024-09-12 RX ORDER — ATENOLOL 25 MG/1
25 TABLET ORAL DAILY
Qty: 90 TABLET | Refills: 3 | Status: SHIPPED | OUTPATIENT
Start: 2024-09-12

## 2024-09-12 RX ORDER — LOSARTAN POTASSIUM 25 MG/1
25 TABLET ORAL DAILY
Qty: 90 TABLET | Refills: 3 | Status: SHIPPED | OUTPATIENT
Start: 2024-09-12

## 2024-09-12 RX ORDER — ROSUVASTATIN CALCIUM 20 MG/1
20 TABLET, COATED ORAL NIGHTLY
Qty: 90 TABLET | Refills: 3 | Status: SHIPPED | OUTPATIENT
Start: 2024-09-12

## 2024-09-12 RX ORDER — LOSARTAN POTASSIUM 25 MG/1
25 TABLET ORAL DAILY
Qty: 90 TABLET | Refills: 3 | Status: SHIPPED | OUTPATIENT
Start: 2024-09-12 | End: 2024-09-12 | Stop reason: SDUPTHER

## 2024-09-12 NOTE — ASSESSMENT & PLAN NOTE
Coronary artery bypass surgery x2 vessels he was doing well maintain on current therapy and risk factor modification LDL is at goal maintain on 10 arm in add aspirin therapy.  And blood pressure control as well

## 2024-09-12 NOTE — ASSESSMENT & PLAN NOTE
Recent onset of chest pain troponins are negative no acute EKG changes noted.  Dobutamine stress echo did not demonstrate any evidence of reversible ischemia.  Continue on aggressive risk factor modification and optimization of therapy.  He is on Prilosec for noncardiac source of symptoms treatment

## 2024-09-12 NOTE — ASSESSMENT & PLAN NOTE
Blood pressure is stable at 120 2/76 mm Hg continue on present therapy to include 10 arm in 25 mg daily, losartan 25 mg a day, maintain on low-salt low-fat diet.

## 2024-09-12 NOTE — PROGRESS NOTES
Subjective:    Patient ID:  Jose Marti is a 79 y.o. male patient here for evaluation Hospital Follow Up      History of Present Illness:     Is a 79-year-old gentleman who was admitted to the hospital on 08/17/2024 with new onset chest pain has history of arteriosclerosis and coronary artery disease dyslipidemia and pulmonary hypertension.  He had previous bypass surgery x2 grafts and he ruled out for cardiac ischemia by negative troponins.  BNP was normal he further underwent noninvasive testing with dobutamine stress echo which did not show any evidence of ischemia.  And he was further discharged with outpatient follow-up.    Since his discharge from the hospital no chest pain no arm neck or jaw pain noted and no significant shortness of breath with effort no swelling in his lower extremities.  No nausea vomiting no blood in the stools and no fevers or chills      History outlined in Discharge summary of last admission is as follows  HPI:   79-year-old male with a past medical history of hypertension, hyperlipidemia, pulmonary hypertension, GERD, angina, coronary artery disease status post CABG x2 in 2017 who presents to the emergency room with reports of chest pain that started in our ago, lasted 40 minutes and resolved spontaneously.  He describes it as heaviness in the chest, nonradiating, with some shortness for breath it began while he was watching television.  He reports mowing the lawn earlier that day and then taking a shower without symptoms.  He reports taking aspirin 81 mg today.  He denies nausea vomiting diaphoresis.  In the ER CBC stable CMP with T bili 2.8 otherwise stable, BNP 57, troponin I high sensitivity 4.7 EKG with Sinus bradycardia 55 beats per minute normal axis no ST elevation or depression or T-wave inversion, chest x-ray nonacute.  Due to patient's high-risk cardiac factors, we will admit to hospital medicine and get stress echo in the a.m. Patient's cardiologist is Dr. Dominguez.            * No surgery found *       Hospital Course:   Mr. Marti was admitted for acute chest pain.  While here, his labs and vital signs were monitored and he was maintained on telemetry.  His troponin was trended and remained negative.  He underwent a dobutamine stress echo which was negative for ischemia and showed an EF of 55-60%.  He has not had any chest pain or symptoms of ACS since being in the hospital.  He is being discharged to home today.  He will follow up with his PCP and his cardiologist.  He did not have any changes or additions to to his chronic medications.  He did not have any adverse events while here.        Review of patient's allergies indicates:   Allergen Reactions    Metoprolol succinate Hives     Broke out in hives        History reviewed. No pertinent past medical history.  Past Surgical History:   Procedure Laterality Date    BACK SURGERY  10/2018    Dr Menon-[facility]  fusion L3-L5    CARDIAC SURGERY  2017    Dr MARK [facility]cial-[facility]- 2 vessel CABG    COLONOSCOPY  11/18/2013    Dr Ramos DUE 11/18/2018    GALLBLADDER SURGERY      TONSILLECTOMY       Social History     Tobacco Use    Smoking status: Never    Smokeless tobacco: Never   Substance Use Topics    Alcohol use: Yes    Drug use: Never        Review of Systems:    As noted in HPI in addition      REVIEW OF SYSTEMS  CARDIOVASCULAR: No recent chest pain, palpitations, arm, neck, or jaw pain  RESPIRATORY: No recent fever, cough chills, SOB or congestion  : No blood in the urine  GI: No Nausea, vomiting, constipation, diarrhea, blood, or reflux.  MUSCULOSKELETAL: No myalgias  NEURO: No lightheadedness or dizziness  EYES: No Double vision, blurry, vision or headache              Objective        Vitals:    09/12/24 1305   BP: 122/76   Pulse: (!) 53   Resp: 16       LIPIDS - LAST 2   Lab Results   Component Value Date    CHOL 123 08/09/2024    CHOL 152 02/12/2024    HDL 52 08/09/2024    HDL 52 02/12/2024    LDLCALC 51 08/09/2024    LDLCALC 77  "02/12/2024    TRIG 113 08/09/2024    TRIG 125 02/12/2024    CHOLHDL 2.4 08/09/2024    CHOLHDL 2.9 02/12/2024       CBC - LAST 2  Lab Results   Component Value Date    WBC 4.14 08/18/2024    WBC 4.70 08/17/2024    RBC 4.35 (L) 08/18/2024    RBC 4.46 (L) 08/17/2024    HGB 13.1 (L) 08/18/2024    HGB 13.6 (L) 08/17/2024    HCT 39.7 (L) 08/18/2024    HCT 40.2 08/17/2024    MCV 91 08/18/2024    MCV 90 08/17/2024    MCH 30.1 08/18/2024    MCH 30.5 08/17/2024    MCHC 33.0 08/18/2024    MCHC 33.8 08/17/2024    RDW 13.7 08/18/2024    RDW 13.6 08/17/2024     08/18/2024     08/17/2024    MPV 10.0 08/18/2024    MPV 10.0 08/17/2024    GRAN 2.3 08/18/2024    GRAN 56.5 08/18/2024    LYMPH 1.3 08/18/2024    LYMPH 30.9 08/18/2024    MONO 0.4 08/18/2024    MONO 9.7 08/18/2024    BASO 0.00 08/18/2024    BASO 0.00 08/17/2024    NRBC 0 08/18/2024    NRBC 0 08/17/2024       CHEMISTRY & LIVER FUNCTION - LAST 2  Lab Results   Component Value Date     08/18/2024     08/17/2024    K 4.1 08/18/2024    K 4.2 08/17/2024     08/18/2024     08/17/2024    CO2 26 08/18/2024    CO2 24 08/17/2024    ANIONGAP 6 (L) 08/18/2024    ANIONGAP 9 08/17/2024    BUN 20 08/18/2024    BUN 21 08/17/2024    CREATININE 1.0 08/18/2024    CREATININE 1.2 08/17/2024     (H) 08/18/2024     (H) 08/17/2024    CALCIUM 8.9 08/18/2024    CALCIUM 9.6 08/17/2024    MG 2.1 08/18/2024    MG 2.2 08/17/2024    ALBUMIN 3.7 08/18/2024    ALBUMIN 4.3 08/17/2024    PROT 5.8 (L) 08/18/2024    PROT 6.8 08/17/2024    ALKPHOS 50 (L) 08/18/2024    ALKPHOS 54 (L) 08/17/2024    ALT 13 08/18/2024    ALT 18 08/17/2024    AST 19 08/18/2024    AST 24 08/17/2024    BILITOT 1.7 (H) 08/18/2024    BILITOT 2.8 (H) 08/17/2024        CARDIAC PROFILE - LAST 2  Lab Results   Component Value Date    BNP 57 08/17/2024    TROPONINIHS 5.0 08/18/2024    TROPONINIHS 5.1 08/17/2024        COAGULATION - LAST 2  No results found for: "LABPT", "INR", " ""APTT"    ENDOCRINE & PSA - LAST 2  Lab Results   Component Value Date    MICROALBUR <0.2 02/12/2024    MICROALBUR 1.0 01/24/2022    TSH 1.73 01/24/2022    TSH 1.15 01/24/2020    PSA 0.70 05/15/2009        ECHOCARDIOGRAM RESULTS  Results for orders placed during the hospital encounter of 08/17/24    Stress Echo Which stress agent will be used? Pharmacological; Color Flow Doppler? No    Interpretation Summary    Left Ventricle: The left ventricle is normal in size. Normal wall thickness. There is normal systolic function with a visually estimated ejection fraction of 55 - 60%.    Stress Protocol: The patient was infused intravenously with dobutamine. The patient received a graduated infusion of the stress agent beginning at 20.0 mcg/kg/min to a peak dose of 30.0 mcg/kg/min. The peak heart rate was 125.0 bpm, which is 89% of age predicted maximum heart rate. The patient was also given atropine for a total dose of 0.4mg mg. The patient reported no symptoms during the stress test. The test was stopped because the end of the protocol was reached.    Baseline ECG: The Baseline ECG reveals sinus rhythm. The axis is normal.    Stress ECG: There are no ST segment deviation identified during the protocol. There are no arrhythmias during stress. There is normal blood pressure response with stress.    ECG Conclusion: The ECG portion of the study is negative for ischemia.    Post-stress Echo: The left ventricle systolic function is hyperdynamic.    Post-stress      CURRENT/PREVIOUS VISIT EKG  Results for orders placed or performed during the hospital encounter of 08/17/24   EKG 12-lead    Collection Time: 08/17/24 12:46 PM   Result Value Ref Range    QRS Duration 106 ms    [facility] QTC Calculation 386 ms    Narrative    Test Reason : R07.9,    Vent. Rate : 055 BPM     Atrial Rate : 055 BPM     P-R Int : 142 ms          QRS Dur : 106 ms      QT Int : 404 ms       P-R-T Axes : 026 -20 022 degrees     QTc Int : 386 ms    Sinus " bradycardia  Otherwise normal ECG  No previous ECGs available  Confirmed by Misbah BORREGO, Dylan SIMON ([id number]) on 8/17/2024 5:04:39 PM    Referred By: [email or web address]   SELF           Confirmed By:Dylan Crawley MD     No valid procedures specified.   No results found for this or any previous visit.    No valid procedures specified.    PHYSICAL EXAM  CONSTITUTIONAL: Well built, well nourished in no apparent distress  NECK: no carotid bruit, no JVD  LUNGS: CTA  CHEST WALL: no tenderness  HEART: regular rate and rhythm, S1, S2 normal, no murmur, click, rub or gallop   ABDOMEN: soft, non-tender; bowel sounds normal; no masses,  no organomegaly  EXTREMITIES: Extremities normal, no edema, no calf tenderness noted  NEURO: AAO X 3    I HAVE REVIEWED :    The vital signs, nurses notes, and all the pertinent radiology and labs.        Current Outpatient Medications   Medication Instructions    aspirin (ECOTRIN) 81 mg, Oral, Daily    atenoloL (TENORMIN) 25 mg, Oral, Daily    calcium carbonate-vitamin D3 600mg (1,000mg) -1,000 unit Tab 1 tablet, Oral, Daily    cinnamon bark 1,000 mg, Oral, Daily    coenzyme Q10 1,200 mg, Oral, 2 times daily    gabapentin (NEURONTIN) 300 mg, Oral, 2 times daily    glucosamine-D3-Boswellia serr 1,500-400-100 mg-unit-mg Tab 1 tablet, Oral, Daily    losartan (COZAAR) 25 mg, Oral, Daily    magnesium oxide (MAG-OX) 400 mg, Oral, Daily    meloxicam (MOBIC) 15 mg, Oral, Daily    multivitamin (THERAGRAN) per tablet 1 tablet, Oral, Daily    omega 3-dha-epa-fish oil 1,000 mg (120 mg-180 mg) Cap 1 capsule, Oral, 2 times daily    omeprazole (PRILOSEC) 20 mg, Oral, Daily    rosuvastatin (CRESTOR) 20 mg, Oral, Nightly    tamsulosin (FLOMAX) 0.8 mg, Oral, Daily          Assessment & Plan     1. Other chest pain  Assessment & Plan:  Recent onset of chest pain troponins are negative no acute EKG changes noted.  Dobutamine stress echo did not demonstrate any evidence of reversible ischemia.  Continue on aggressive risk  factor modification and optimization of therapy.  He is on Prilosec for noncardiac source of symptoms treatment      2. Essential (primary) hypertension  Assessment & Plan:  Blood pressure is stable at 120 2/76 mm Hg continue on present therapy to include 10 arm in 25 mg daily, losartan 25 mg a day, maintain on low-salt low-fat diet.    Orders:  -     Discontinue: losartan (COZAAR) 25 MG tablet; Take 1 tablet (25 mg total) by mouth once daily.  Dispense: 90 tablet; Refill: 3  -     losartan (COZAAR) 25 MG tablet; Take 1 tablet (25 mg total) by mouth once daily.  Dispense: 90 tablet; Refill: 3    3. Mixed hyperlipidemia  Assessment & Plan:  History of dyslipidemia and currently well controlled on Crestor 20 mg daily LDL cholesterol is below 60 maintain diet control and daily physical activity.      4. History of coronary artery bypass graft x 2  Assessment & Plan:  Coronary artery bypass surgery x2 vessels he was doing well maintain on current therapy and risk factor modification LDL is at goal maintain on 10 arm in add aspirin therapy.  And blood pressure control as well      5. Gastro-esophageal reflux disease without esophagitis  Assessment & Plan:  GE reflux continue on present regimen including Prilosec 20 mg daily      Other orders  -     rosuvastatin (CRESTOR) 20 MG tablet; Take 1 tablet (20 mg total) by mouth every evening.  Dispense: 90 tablet; Refill: 3  -     atenoloL (TENORMIN) 25 MG tablet; Take 1 tablet (25 mg total) by mouth once daily.  Dispense: 90 tablet; Refill: 3          Follow up in about 6 months (around 3/12/2025).

## 2024-10-11 ENCOUNTER — PATIENT MESSAGE (OUTPATIENT)
Dept: FAMILY MEDICINE | Facility: CLINIC | Age: 79
End: 2024-10-11
Payer: MEDICARE

## 2024-10-11 DIAGNOSIS — N40.0 ENLARGED PROSTATE WITHOUT LOWER URINARY TRACT SYMPTOMS (LUTS): ICD-10-CM

## 2024-10-11 RX ORDER — TAMSULOSIN HYDROCHLORIDE 0.4 MG/1
0.8 CAPSULE ORAL DAILY
Qty: 180 CAPSULE | Refills: 1 | Status: SHIPPED | OUTPATIENT
Start: 2024-10-11

## 2024-11-11 ENCOUNTER — OFFICE VISIT (OUTPATIENT)
Dept: OTOLARYNGOLOGY | Facility: CLINIC | Age: 79
End: 2024-11-11
Payer: MEDICARE

## 2024-11-11 VITALS
WEIGHT: 204.81 LBS | HEART RATE: 62 BPM | SYSTOLIC BLOOD PRESSURE: 155 MMHG | DIASTOLIC BLOOD PRESSURE: 75 MMHG | HEIGHT: 73 IN | BODY MASS INDEX: 27.14 KG/M2

## 2024-11-11 DIAGNOSIS — L29.9 ITCHING OF EAR: Primary | ICD-10-CM

## 2024-11-11 DIAGNOSIS — H61.23 CERUMINOSIS, BILATERAL: ICD-10-CM

## 2024-11-11 PROCEDURE — 69210 REMOVE IMPACTED EAR WAX UNI: CPT | Mod: S$GLB,,, | Performed by: OTOLARYNGOLOGY

## 2024-11-11 PROCEDURE — 3288F FALL RISK ASSESSMENT DOCD: CPT | Mod: CPTII,S$GLB,, | Performed by: OTOLARYNGOLOGY

## 2024-11-11 PROCEDURE — 99999 PR PBB SHADOW E&M-EST. PATIENT-LVL IV: CPT | Mod: PBBFAC,,, | Performed by: OTOLARYNGOLOGY

## 2024-11-11 PROCEDURE — 99203 OFFICE O/P NEW LOW 30 MIN: CPT | Mod: 25,S$GLB,, | Performed by: OTOLARYNGOLOGY

## 2024-11-11 PROCEDURE — 3077F SYST BP >= 140 MM HG: CPT | Mod: CPTII,S$GLB,, | Performed by: OTOLARYNGOLOGY

## 2024-11-11 PROCEDURE — 1126F AMNT PAIN NOTED NONE PRSNT: CPT | Mod: CPTII,S$GLB,, | Performed by: OTOLARYNGOLOGY

## 2024-11-11 PROCEDURE — 1160F RVW MEDS BY RX/DR IN RCRD: CPT | Mod: CPTII,S$GLB,, | Performed by: OTOLARYNGOLOGY

## 2024-11-11 PROCEDURE — 3078F DIAST BP <80 MM HG: CPT | Mod: CPTII,S$GLB,, | Performed by: OTOLARYNGOLOGY

## 2024-11-11 PROCEDURE — 1159F MED LIST DOCD IN RCRD: CPT | Mod: CPTII,S$GLB,, | Performed by: OTOLARYNGOLOGY

## 2024-11-11 PROCEDURE — 1101F PT FALLS ASSESS-DOCD LE1/YR: CPT | Mod: CPTII,S$GLB,, | Performed by: OTOLARYNGOLOGY

## 2024-11-11 RX ORDER — MINERAL OIL 1000 MG/ML
LIQUID TOPICAL
Start: 2024-11-11

## 2024-11-11 NOTE — PROCEDURES
EAR DEBRIDEMENT / CERUMEN DISIMPACTION, 00834     Indication: Excessive cerumen     Side: Bilateral    Findings: See note for details.     Procedure: Ear canal(s) cleared completely using alligator and curette with microscopy.  Wax was not hydrolyzed with peroxide.  Topical medication was not applied.    Complication: none

## 2024-11-11 NOTE — PROGRESS NOTES
[facility]    Subjective:      Patient: Jose Marti Patient PCP: Abdon Schuler MD         :  1945     Sex:  male      MRN:  [id number]          Date of Visit: 2024      Chief Complaint: Ear Problem (Impacted ear wax in right ear and itching in both ears./Since March for clogged ears/Itching for several years)      Patient ID: Jose Marti is a 79 y.o. male     Patient is a  lifelong NON-smoker with a past medical history of cardiovascular heart disease, pulmonary hypertension on aspirin and fish oil but no other anticoagulation, no history of diabetes or immunosuppression, no hypertrophic skin disease seen today self-referred for itching of the ears in the clogged sensation attributed to wax.  No audiogram.    Labs:  WBC   Date Value Ref Range Status   2024 4.14 3.90 - 12.70 K/uL Final     Hemoglobin   Date Value Ref Range Status   2024 13.1 (L) 14.0 - 18.0 g/dL Final     Platelets   Date Value Ref Range Status   2024 159 150 - 450 K/uL Final     Creatinine   Date Value Ref Range Status   2024 1.0 0.5 - 1.4 mg/dL Final     TSH   Date Value Ref Range Status   2022 1.73 0.40 - 4.50 mIU/L Final       Past Medical History  He has no past medical history on file.    Family / Surgical / Social History  His family history is not on file.    Past Surgical History:   Procedure Laterality Date    BACK SURGERY  10/2018    Dr Menon-[facility]  fusion L3-L5    CARDIAC SURGERY      Dr MARK [facility]cial[facility]- 2 vessel CABG    COLONOSCOPY  2013    Dr Ramos DUE 2018    GALLBLADDER SURGERY      TONSILLECTOMY         Social History     Tobacco Use    Smoking status: Never    Smokeless tobacco: Never   Substance and Sexual Activity    Alcohol use: Yes    Drug use: Never    Sexual activity: Yes     Partners: Female       Medications  He has a current medication list which includes the following prescription(s): aspirin, atenolol, calcium carbonate-vitamin d3, cinnamon bark,  "coenzyme q10, glucosamine-d3-boswellia serr, losartan, magnesium oxide, meloxicam, multivitamin, omega 3-dha-epa-fish oil, omeprazole, rosuvastatin, tamsulosin, and gabapentin.      Allergies  Review of patient's allergies indicates:   Allergen Reactions    Metoprolol succinate Hives     Broke out in hives        All medications, allergies, and past history have been reviewed.    Objective:      Vitals:      8/19/2024     9:51 AM 9/12/2024     1:05 PM 11/11/2024    10:12 AM   Vitals - 1 value per visit   SYSTOLIC 120 122 155   DIASTOLIC 74 76 75   Pulse 76 53 62   Resp  16    SPO2  99 %    Weight (lb) 210 205 204.81   Weight (kg) 95.255 92.987 92.9   Height 6' 0.5" (1.842 m) 6' 0.5" (1.842 m) 6' 0.5" (1.842 m)   BMI (Calculated) 28.1 27.4 27.4   Pain Score Three Zero Zero       Body surface area is 2.18 meters squared.    Physical Exam:    GENERAL  APPEARANCE -  alert, appears stated age, and cooperative  BARRIER(S) TO COMMUNICATION -  none VOICE - appropriate for age and gender    INTEGUMENTARY  no suspicious head and neck lesions    HEENT  HEAD: Normocephalic, without obvious abnormality, atraumatic  FACE: INSPECTION - Symmetric, no signs of trauma, no suspicious lesion(s)      STRENGTH - facial symmetry intact     EYES  Normal occular alignment and mobility with no visible nystagmus at rest    EARS/NOSE/MOUTH/THROAT  EARS  PINNAE AND EXTERNAL EARS - no suspicious lesion OTOSCOPIC EXAM (surgical microscopy was used for visualization/instrumentation): EAR EXAM - right-greater-than-left heavy wax buildup right side with more of a film wax and skin all cleared with combination of alligators and curette.  Fungus (white) saprophytic on the right side only.  Some redness and scaling of the skin under on the right side only. HEARING - grossly intact to voice/finger rub    NOSE AND SINUSES  EXTERNAL NOSE - Grossly normal for age/sex   MUCOSA - no drainage     CHEST AND LUNG   INSPECTION & AUSCULTATION - normal effort, no " stridor    NEUROLOGIC  MENTAL STATUS - alert, interactive CRANIAL NERVES - normal        Procedure(s):  Cerumen removal performed.  See procedure note.          Assessment:      Problem List Items Addressed This Visit    None  Visit Diagnoses       Itching of ear    -  Primary    Ceruminosis, bilateral                     Plan:      Wax cleared.  Minimal saprophytic fungus on the wax on the right.  Vinegar water weekly.  Nightly mineral oil.  No clear indication for derm otic oil or other treatment of chronic itching as itching appears to be more intermittent and should improve with debridement routine care.    Follow up as needed          Voice recognition software was used in the creation of this note/communication and any sound-alike errors which may have occurred from its use should be taken in context when interpreting.  If such errors prevent a clear understanding of the note/communication, please contact the office for clarification.

## 2025-02-13 ENCOUNTER — TELEPHONE (OUTPATIENT)
Dept: FAMILY MEDICINE | Facility: CLINIC | Age: 80
End: 2025-02-13
Payer: MEDICARE

## 2025-02-13 DIAGNOSIS — E78.2 MIXED HYPERLIPIDEMIA: ICD-10-CM

## 2025-02-13 DIAGNOSIS — Z79.899 ENCOUNTER FOR LONG-TERM (CURRENT) USE OF MEDICATIONS: Primary | ICD-10-CM

## 2025-02-13 DIAGNOSIS — I10 ESSENTIAL (PRIMARY) HYPERTENSION: ICD-10-CM

## 2025-02-27 ENCOUNTER — OFFICE VISIT (OUTPATIENT)
Dept: FAMILY MEDICINE | Facility: CLINIC | Age: 80
End: 2025-02-27
Payer: MEDICARE

## 2025-02-27 VITALS
HEIGHT: 73 IN | DIASTOLIC BLOOD PRESSURE: 68 MMHG | HEART RATE: 70 BPM | OXYGEN SATURATION: 98 % | BODY MASS INDEX: 28.23 KG/M2 | WEIGHT: 213 LBS | SYSTOLIC BLOOD PRESSURE: 124 MMHG

## 2025-02-27 DIAGNOSIS — Z12.5 SPECIAL SCREENING FOR MALIGNANT NEOPLASM OF PROSTATE: ICD-10-CM

## 2025-02-27 DIAGNOSIS — E78.2 MIXED HYPERLIPIDEMIA: Primary | ICD-10-CM

## 2025-02-27 DIAGNOSIS — N40.1 BPH WITH OBSTRUCTION/LOWER URINARY TRACT SYMPTOMS: ICD-10-CM

## 2025-02-27 DIAGNOSIS — I10 ESSENTIAL (PRIMARY) HYPERTENSION: ICD-10-CM

## 2025-02-27 DIAGNOSIS — M17.0 PRIMARY OSTEOARTHRITIS OF BOTH KNEES: ICD-10-CM

## 2025-02-27 DIAGNOSIS — I27.20 PULMONARY HTN: ICD-10-CM

## 2025-02-27 DIAGNOSIS — M75.42 IMPINGEMENT SYNDROME OF LEFT SHOULDER: ICD-10-CM

## 2025-02-27 DIAGNOSIS — I25.118 ATHEROSCLEROTIC HEART DISEASE OF NATIVE CORONARY ARTERY WITH OTHER FORMS OF ANGINA PECTORIS: ICD-10-CM

## 2025-02-27 DIAGNOSIS — K21.9 GASTRO-ESOPHAGEAL REFLUX DISEASE WITHOUT ESOPHAGITIS: ICD-10-CM

## 2025-02-27 DIAGNOSIS — N13.8 BPH WITH OBSTRUCTION/LOWER URINARY TRACT SYMPTOMS: ICD-10-CM

## 2025-02-27 DIAGNOSIS — M51.16 LUMBAR DISC DISEASE WITH RADICULOPATHY: ICD-10-CM

## 2025-02-27 DIAGNOSIS — Z95.1 HISTORY OF CORONARY ARTERY BYPASS GRAFT X 2: ICD-10-CM

## 2025-02-27 PROCEDURE — 99214 OFFICE O/P EST MOD 30 MIN: CPT | Mod: S$GLB,,, | Performed by: FAMILY MEDICINE

## 2025-02-27 PROCEDURE — 3288F FALL RISK ASSESSMENT DOCD: CPT | Mod: CPTII,S$GLB,, | Performed by: FAMILY MEDICINE

## 2025-02-27 PROCEDURE — 1101F PT FALLS ASSESS-DOCD LE1/YR: CPT | Mod: CPTII,S$GLB,, | Performed by: FAMILY MEDICINE

## 2025-02-27 PROCEDURE — 3074F SYST BP LT 130 MM HG: CPT | Mod: CPTII,S$GLB,, | Performed by: FAMILY MEDICINE

## 2025-02-27 PROCEDURE — 1159F MED LIST DOCD IN RCRD: CPT | Mod: CPTII,S$GLB,, | Performed by: FAMILY MEDICINE

## 2025-02-27 PROCEDURE — 3078F DIAST BP <80 MM HG: CPT | Mod: CPTII,S$GLB,, | Performed by: FAMILY MEDICINE

## 2025-02-27 NOTE — PROGRESS NOTES
SUBJECTIVE:    Patient ID: Jose Marti is a 80 y.o. male.    Chief Complaint: Follow-up (Review  Lab-results, brought med list, no complaints, abc )    Follow-up  Associated symptoms include neck pain. Pertinent negatives include no arthralgias, chest pain, headaches, joint swelling, vomiting or weakness.       History of Present Illness    CHIEF COMPLAINT:  Jose presents today for follow-up    MUSCULOSKELETAL:  He reports knee pain as his primary joint concern and receives steroid injections every 3 months from Dr. Armijo. The knee pain intensity fluctuates between periods of no pain and significant discomfort. He is aware of gel injections as a treatment option but is currently postponing this intervention. He also experiences left shoulder pain with external rotation, though maintains ability to perform overhead movements and reach behind back. He reports morning lower back tightness since falling backwards over a year ago, which improves throughout the day. He denies sciatic pain. History of back surgery by Dr. Menon in 2018. CT showed a herniated disc above the previous surgery site.    CARDIOVASCULAR:  History of bypass surgery. He denies chest pain and shortness of breath. He follows with  Dr. Dominguez for cardiology care.    GENITOURINARY:  He denies urinary difficulties and reports good response to doubled Flomax dosage (twice daily).    INTEGUMENTARY:  He reports easy bruising, particularly noticeable on upper extremities. He has reduced Meloxicam intake from daily to twice weekly due to bruising concerns.    CURRENT MEDICATIONS:  He takes gabapentin, Meloxicam (twice weekly), aspirin 81mg daily, Omeprazole (supply until September), and Flomax (twice daily, may need refill in summer).      ROS:  Constitutional: -appetite change, -chills, -fatigue, -fever, -unexpected weight change  HENT: -ear pain, -trouble swallowing  Eyes: -pain, -discharge, -visual disturbance  Respiratory: -apnea, -cough,  -shortness of breath, -wheezing  Cardiovascular: -chest pain, -leg swelling  Gastrointestinal: -abdominal pain, -blood in stool, -constipation, -diarrhea, -nausea, -vomiting, -reflux  Endocrine: -cold intolerance, -heat intolerance, -polydipsia  Genitourinary: -bladder incontinence, -dysuria, -erectile dysfunction, -frequency, -hematuria, -testicular pain, -urgency, -nocturia, -difficulty urinating  Musculoskeletal: -gait problem, -joint swelling, -myalgia, +joint pain, +back pain  Neurological: -dizziness, -seizures, -numbness  Psychiatric/Behavioral: -agitation, -hallucinations, -nervous, -anxiety symptoms         Telephone on 02/13/2025   Component Date Value Ref Range Status    Creatinine, Urine 02/24/2025 96  20 - 320 mg/dL Final    Microalb, Ur 02/24/2025 0.6  See Note: mg/dL Final    Microalb/Creat Ratio 02/24/2025 6  <30 mg/g creat Final    Cholesterol 02/24/2025 132  <200 mg/dL Final    HDL 02/24/2025 50  > OR = 40 mg/dL Final    Triglycerides 02/24/2025 132  <150 mg/dL Final    LDL Cholesterol 02/24/2025 60  mg/dL (calc) Final    HDL/Cholesterol Ratio 02/24/2025 2.6  <5.0 (calc) Final    Non HDL Chol. (LDL+VLDL) 02/24/2025 82  <130 mg/dL (calc) Final    Glucose 02/24/2025 91  65 - 99 mg/dL Final    BUN 02/24/2025 15  7 - 25 mg/dL Final    Creatinine 02/24/2025 0.98  0.70 - 1.22 mg/dL Final    eGFR 02/24/2025 78  > OR = 60 mL/min/1.73m2 Final    BUN/Creatinine Ratio 02/24/2025 SEE NOTE:  6 - 22 (calc) Final    Sodium 02/24/2025 142  135 - 146 mmol/L Final    Potassium 02/24/2025 4.5  3.5 - 5.3 mmol/L Final    Chloride 02/24/2025 105  98 - 110 mmol/L Final    CO2 02/24/2025 32  20 - 32 mmol/L Final    Calcium 02/24/2025 9.4  8.6 - 10.3 mg/dL Final    Total Protein 02/24/2025 6.1  6.1 - 8.1 g/dL Final    Albumin 02/24/2025 4.2  3.6 - 5.1 g/dL Final    Globulin, Total 02/24/2025 1.9  1.9 - 3.7 g/dL (calc) Final    Albumin/Globulin Ratio 02/24/2025 2.2  1.0 - 2.5 (calc) Final    Total Bilirubin 02/24/2025 1.7  "(H)  0.2 - 1.2 mg/dL Final    Alkaline Phosphatase 02/24/2025 66  35 - 144 U/L Final    AST 02/24/2025 22  10 - 35 U/L Final    ALT 02/24/2025 20  9 - 46 U/L Final       No past medical history on file.  Social History[1]  Past Surgical History:   Procedure Laterality Date    BACK SURGERY  10/2018    Dr Menon-Lumbar  fusion L3-L5    CARDIAC SURGERY  2017    Dr MARK [facility]- 2 vessel CABG    COLONOSCOPY  11/18/2013    Dr Ramos DUE 11/18/2018    GALLBLADDER SURGERY      TONSILLECTOMY       No family history on file.    The CVD Risk score (D'Agostino, et al., 2008) failed to calculate for the following reasons:    The 2008 CVD risk score is only valid for ages 30 to 74    All of your core healthy metrics are met.      Review of patient's allergies indicates:   Allergen Reactions    Metoprolol succinate Hives     Broke out in hives      Current Medications[2]    Review of Systems   Constitutional:  Negative for activity change and unexpected weight change.   HENT:  Negative for hearing loss, rhinorrhea and trouble swallowing.    Eyes:  Negative for discharge and visual disturbance.   Respiratory:  Negative for chest tightness and wheezing.    Cardiovascular:  Negative for chest pain and palpitations.   Gastrointestinal:  Negative for blood in stool, constipation, diarrhea and vomiting.   Endocrine: Negative for polydipsia and polyuria.   Genitourinary:  Positive for urgency. Negative for difficulty urinating and hematuria.   Musculoskeletal:  Positive for neck pain. Negative for arthralgias and joint swelling.   Neurological:  Negative for weakness and headaches.   Psychiatric/Behavioral:  Negative for confusion and dysphoric mood.            Objective:      Vitals:    02/27/25 0827   BP: 124/68   Pulse: 70   SpO2: 98%   Weight: 96.6 kg (213 lb)   Height: 6' 0.5" (1.842 m)     Physical Exam  Vitals and nursing note reviewed.   Constitutional:       General: He is not in acute distress.     Appearance: Normal " appearance. He is well-developed. He is not toxic-appearing.   HENT:      Head: Normocephalic and atraumatic.      Right Ear: Tympanic membrane and external ear normal.      Left Ear: Tympanic membrane and external ear normal.      Nose: Nose normal.      Mouth/Throat:      Pharynx: Oropharynx is clear. No posterior oropharyngeal erythema.   Eyes:      Pupils: Pupils are equal, round, and reactive to light.   Neck:      Thyroid: No thyromegaly.      Vascular: No carotid bruit.   Cardiovascular:      Rate and Rhythm: Normal rate and regular rhythm.      Heart sounds: Normal heart sounds. No murmur heard.  Pulmonary:      Effort: Pulmonary effort is normal.      Breath sounds: Normal breath sounds. No wheezing or rales.   Abdominal:      General: Bowel sounds are normal. There is no distension.      Palpations: Abdomen is soft.      Tenderness: There is no abdominal tenderness.   Musculoskeletal:         General: No tenderness or deformity. Normal range of motion.      Cervical back: Normal range of motion and neck supple.      Lumbar back: Normal. No spasms.      Comments: Bends 90 degrees at  waist, shoulders and knees have full range of motion, no pitting edema to lower extremities knees are crepitant bilaterally shoulders good range of motion   Lymphadenopathy:      Cervical: No cervical adenopathy.   Skin:     General: Skin is warm and dry.      Findings: No rash.   Neurological:      General: No focal deficit present.      Mental Status: He is alert and oriented to person, place, and time. Mental status is at baseline.      Cranial Nerves: No cranial nerve deficit.      Coordination: Coordination normal.   Psychiatric:         Mood and Affect: Mood normal.         Behavior: Behavior normal.         Thought Content: Thought content normal.         Judgment: Judgment normal.       Physical Exam    Neck: No carotid bruit.  Cardiovascular: Normal rate and regular rhythm. Normal heart sounds. No murmur  heard.  Vitals: Normal blood pressure. Weight gain of 9 lbs.             Assessment:       1. Mixed hyperlipidemia    2. Lumbar disc disease with radiculopathy    3. Pulmonary HTN    4. History of coronary artery bypass graft x 2    5. Essential (primary) hypertension    6. 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 BPH with obstruction/lower urinary tract symptoms    8. Gastro-esophageal reflux disease without esophagitis    9. Primary osteoarthritis of both knees    10. Impingement syndrome of left shoulder         Plan:       Mixed hyperlipidemia  Cholesterol excellent at 133 high HDL 50  LDL 60 continue statin therapy  Lumbar disc disease with radiculopathy  Managing conservatively, has some back tightness every morning but gets better with movement.  Has had Dr. Menon lumbar fusion in 2018  Pulmonary HTN  Stable at this time  History of coronary artery bypass graft x 2  Sees Dr. DAR Dominguez, no recent angina  Essential (primary) hypertension  Blood pressure well controlled current regimen  Atherosclerotic heart disease of native coronary artery with other forms of angina pectoris  No angina  BPH with obstruction/lower urinary tract symptoms  Increase Flomax to 0.8 mg daily and symptoms are under control, PSA in six-months  Gastro-esophageal reflux disease without esophagitis  Omeprazole 20 mg working well  Primary osteoarthritis of both knees  Continue meloxicam 15 mg daily, knee cortisone injections per Dr. Armijo as needed  Impingement syndrome of left shoulder  Patient not desiring orthopedic involvement yet    Assessment & Plan    IMPRESSION:  - Knees: Receiving steroid injections every 3 months from Dr. Armijo; not yet requiring gel injections or surgery  - Shoulders: Decreased external rotation noted, likely arthritis; no torn rotator cuff or need for surgery at this time  - Back: Previous L3-L5 fusion by Dr. Menon in 2018; herniated disc  above fusion site causing tightness but manageable without pain  - Cardiovascular: No chest pain or shortness of breath; blood pressure well-controlled  - Noted weight gain of 9 lbs  - Cholesterol, triglycerides, blood sugar, kidney, and liver function tests within normal limits  - Urinary symptoms well-managed with current Flomax regimen  - Skin cancer screening negative  - Gait assessment reveals adequate mobility for age and condition    KNEE PAIN:  - Evaluated the patient's knee pain, noting it as the most severe among reported body pains.  - Observed the patient's gait, noting intermittent pain and some limping.  - Assessed that knee replacement surgery is not necessary at this time.  - Continued the current treatment plan of steroid injections every 3 months administered by Dr. Armijo.  - Discussed potential progression of knee treatment from steroid injections to gel injections, and eventually surgery if needed.  - Instructed the patient to report any changes in pain or mobility.    LEFT SHOULDER PAIN:  - Evaluated the patient's left shoulder pain, particularly noting pain with certain movements.  - Observed decreased external rotation in the left shoulder.  - Assessed that the rotator cuff is likely intact, as the patient can still touch their belt buckle.  - Suggested the possibility of arthritis as the cause of pain.  - Determined that shoulder surgery is not necessary at this time.  - Discussed the option of cortisone injections as a potential treatment if pain worsens.  - Instructed the patient to monitor pain levels and report any significant changes.    LOWER BACK PAIN:  - Monitored the patient's lower back tightness, which is worse in the morning and improves throughout the day.  - Evaluated the herniated disc above the previous surgery site.  - Observed bone spurs on X-ray.  - Acknowledged the patient's previous back surgery and current condition.  - Explained the risks associated with additional  spinal fusion surgery and reasons to avoid if possible.  - Continued the current conservative management approach without surgery.  - Advised the patient to report any worsening of symptoms or new neurological changes.    BRUISING:  - Monitored the patient's ongoing bruising.  - Observed bruising on the patient's skin during exam.  - Acknowledged that aspirin and meloxicam can contribute to bruising.  - Noted that the patient has reduced meloxicam intake to manage bruising.  - Advised the patient to continue monitoring bruising and report any significant changes or concerns.    WEIGHT MANAGEMENT:  - Noted a 9-pound weight gain since the last visit.  - Discussed the impact of weight gain on knee pain.  - Recommend losing 9-10 lbs to alleviate stress on knees.  - Advised the patient to reduce intake of sweets and carbohydrates to facilitate weight loss.  - Encouraged regular physical activity within the patient's comfort level.  - Jose to reduce intake of sweets, daquan cakes, and cookies to promote weight loss.  - Jose to continue to limit carbohydrate consumption, particularly rice and potatoes.    CARDIAC STATUS:  - Monitored the patient's cardiac status, noting no reports of chest pain or shortness of breath.  - Evaluated cardiovascular health, observing regular heartbeat and good blood pressure.  - Noted low cholesterol and triglyceride levels.  - Acknowledged the patient's history of bypass surgery.  - Confirmed ongoing care under cardiologist Dr. Wilhelm.  - Advised the   patient to continue regular follow-ups with the cardiologist and report any cardiac symptoms immediately.    MEDICATIONS/SUPPLEMENTS:  - Maintained the prescription of gabapentin and meloxicam for pain management.  - Continued the prescription of meloxicam.  - Adjusted the dosage from daily use to twice weekly use.  - Instructed the patient to monitor for any side effects and report them promptly.  - Continued the daily prescription of baby  aspirin.  - Advised the patient to maintain consistent use and report any unusual bleeding or bruising.    FOLLOW UP:  - Educated the patient on the relationship between weight gain and increased knee stress.         Follow up in about 6 months (around 8/27/2025), or hld CAD.        This note was generated with the assistance of ambient listening technology. Verbal consent was obtained by the patient and accompanying visitor(s) for the recording of patient appointment to facilitate this note. I attest to having reviewed and edited the generated note for accuracy, though some syntax or spelling errors may persist. Please contact the author of this note for any clarification.      3/1/2025 Abdon Schuler           [1]   Social History  Socioeconomic History    Marital status:    Tobacco Use    Smoking status: Never    Smokeless tobacco: Never   Substance and Sexual Activity    Alcohol use: Yes    Drug use: Never    Sexual activity: Yes     Partners: Female     Social Drivers of Health     Financial Resource Strain: Low Risk  (8/18/2024)    Overall Financial Resource Strain (CARDIA)     Difficulty of Paying Living Expenses: Not hard at all   Food Insecurity: No Food Insecurity (8/18/2024)    Hunger Vital Sign     Worried About Running Out of Food in the Last Year: Never true     Ran Out of Food in the Last Year: Never true   Transportation Needs: No Transportation Needs (8/18/2024)    TRANSPORTATION NEEDS     Transportation : No   Physical Activity: Sufficiently Active (8/18/2024)    Exercise Vital Sign     Days of Exercise per Week: 2 days     Minutes of Exercise per Session: 150+ min   Stress: No Stress Concern Present (8/18/2024)    [employer] - Occupational Stress Questionnaire     Feeling of Stress : Not at all   Housing Stability: Unknown (8/18/2024)    Housing Stability Vital Sign     Unable to Pay for Housing in the Last Year: No     Homeless in the Last Year: No   [2]    Current Outpatient Medications:     aspirin (ECOTRIN) 81 MG EC tablet, Take 81 mg by mouth once daily., Disp: , Rfl:     atenoloL (TENORMIN) 25 MG tablet, Take 1 tablet (25 mg total) by mouth once daily., Disp: 90 tablet, Rfl: 3    calcium carbonate-vitamin D3 600mg (1,000mg) -1,000 unit Tab, Take 1 tablet by mouth once daily., Disp: , Rfl:     cinnamon bark 500 mg capsule, Take 1,000 mg by mouth once daily., Disp: , Rfl:     coenzyme Q10 200 mg capsule, Take 1,200 mg by mouth 2 (two) times daily., Disp: , Rfl:     glucosamine-D3-[place]well[place] serr 1,500-400-100 mg-unit-mg Tab, Take 1 tablet by mouth once daily., Disp: , Rfl:     losartan (COZAAR) 25 MG tablet, Take 1 tablet (25 mg total) by mouth once daily., Disp: 90 tablet, Rfl: 3    magnesium oxide (MAG-OX) 400 mg (241.3 mg magnesium) tablet, Take 400 mg by mouth once daily., Disp: , Rfl:     meloxicam (MOBIC) 15 MG tablet, Take 15 mg by mouth once daily., Disp: , Rfl:     mineral oil TOP (MINERAL OIL LIGHT) Oil, Apply 4-5 drops BOTH ear canals with a dropper nightly, Disp: , Rfl:     multivitamin (THERAGRAN) per tablet, Take 1 tablet by mouth once daily., Disp: , Rfl:     omega 3-dha-epa-fish oil 1,000 mg (120 mg-180 mg) Cap, Take 1 capsule by mouth 2 (two) times a day., Disp: , Rfl:     omeprazole (PRILOSEC) 20 MG capsule, Take 1 capsule (20 mg total) by mouth once daily., Disp: 90 capsule, Rfl: 3    rosuvastatin (CRESTOR) 20 MG tablet, Take 1 tablet (20 mg total) by mouth every evening., Disp: 90 tablet, Rfl: 3    tamsulosin (FLOMAX) 0.4 mg Cap, Take 2 capsules (0.8 mg total) by mouth once daily., Disp: 180 capsule, Rfl: 1    gabapentin (NEURONTIN) 300 MG capsule, Take 1 capsule (300 mg total) by mouth 2 (two) times daily., Disp: 60 capsule, Rfl: 5

## 2025-04-28 ENCOUNTER — PATIENT MESSAGE (OUTPATIENT)
Dept: FAMILY MEDICINE | Facility: CLINIC | Age: 80
End: 2025-04-28
Payer: MEDICARE

## 2025-04-28 DIAGNOSIS — N40.0 ENLARGED PROSTATE WITHOUT LOWER URINARY TRACT SYMPTOMS (LUTS): ICD-10-CM

## 2025-04-28 RX ORDER — TAMSULOSIN HYDROCHLORIDE 0.4 MG/1
0.8 CAPSULE ORAL DAILY
Qty: 180 CAPSULE | Refills: 1 | Status: SHIPPED | OUTPATIENT
Start: 2025-04-28

## 2025-06-03 ENCOUNTER — OFFICE VISIT (OUTPATIENT)
Dept: CARDIOLOGY | Facility: CLINIC | Age: 80
End: 2025-06-03
Payer: MEDICARE

## 2025-06-03 VITALS
BODY MASS INDEX: 27.69 KG/M2 | DIASTOLIC BLOOD PRESSURE: 77 MMHG | SYSTOLIC BLOOD PRESSURE: 135 MMHG | WEIGHT: 207 LBS | OXYGEN SATURATION: 96 % | HEART RATE: 64 BPM

## 2025-06-03 DIAGNOSIS — E78.2 MIXED HYPERLIPIDEMIA: ICD-10-CM

## 2025-06-03 DIAGNOSIS — I36.1 NONRHEUMATIC TRICUSPID VALVE REGURGITATION: ICD-10-CM

## 2025-06-03 DIAGNOSIS — R60.0 LOCALIZED EDEMA: ICD-10-CM

## 2025-06-03 DIAGNOSIS — Z95.1 HISTORY OF CORONARY ARTERY BYPASS GRAFT X 2: Primary | ICD-10-CM

## 2025-06-03 DIAGNOSIS — I34.0 NONRHEUMATIC MITRAL VALVE REGURGITATION: ICD-10-CM

## 2025-06-03 PROCEDURE — 1159F MED LIST DOCD IN RCRD: CPT | Mod: CPTII,S$GLB,, | Performed by: INTERNAL MEDICINE

## 2025-06-03 PROCEDURE — 1101F PT FALLS ASSESS-DOCD LE1/YR: CPT | Mod: CPTII,S$GLB,, | Performed by: INTERNAL MEDICINE

## 2025-06-03 PROCEDURE — 99999 PR PBB SHADOW E&M-EST. PATIENT-LVL III: CPT | Mod: PBBFAC,,, | Performed by: INTERNAL MEDICINE

## 2025-06-03 PROCEDURE — 3078F DIAST BP <80 MM HG: CPT | Mod: CPTII,S$GLB,, | Performed by: INTERNAL MEDICINE

## 2025-06-03 PROCEDURE — 1126F AMNT PAIN NOTED NONE PRSNT: CPT | Mod: CPTII,S$GLB,, | Performed by: INTERNAL MEDICINE

## 2025-06-03 PROCEDURE — 1160F RVW MEDS BY RX/DR IN RCRD: CPT | Mod: CPTII,S$GLB,, | Performed by: INTERNAL MEDICINE

## 2025-06-03 PROCEDURE — 99214 OFFICE O/P EST MOD 30 MIN: CPT | Mod: S$GLB,,, | Performed by: INTERNAL MEDICINE

## 2025-06-03 PROCEDURE — 3288F FALL RISK ASSESSMENT DOCD: CPT | Mod: CPTII,S$GLB,, | Performed by: INTERNAL MEDICINE

## 2025-06-03 PROCEDURE — 3075F SYST BP GE 130 - 139MM HG: CPT | Mod: CPTII,S$GLB,, | Performed by: INTERNAL MEDICINE

## 2025-06-03 RX ORDER — SPIRONOLACTONE 25 MG/1
25 TABLET ORAL EVERY OTHER DAY
Qty: 45 TABLET | Refills: 3 | Status: SHIPPED | OUTPATIENT
Start: 2025-06-03 | End: 2026-06-03

## 2025-06-05 ENCOUNTER — LAB VISIT (OUTPATIENT)
Dept: LAB | Facility: HOSPITAL | Age: 80
End: 2025-06-05
Attending: INTERNAL MEDICINE
Payer: MEDICARE

## 2025-06-05 DIAGNOSIS — I36.1 NONRHEUMATIC TRICUSPID VALVE REGURGITATION: ICD-10-CM

## 2025-06-05 DIAGNOSIS — R60.0 LOCALIZED EDEMA: ICD-10-CM

## 2025-06-05 LAB
ANION GAP (SMH): 6 MMOL/L (ref 8–16)
BNP SERPL-MCNC: 190 PG/ML
BUN SERPL-MCNC: 15 MG/DL (ref 8–23)
CALCIUM SERPL-MCNC: 8.9 MG/DL (ref 8.7–10.5)
CHLORIDE SERPL-SCNC: 107 MMOL/L (ref 95–110)
CO2 SERPL-SCNC: 27 MMOL/L (ref 23–29)
CREAT SERPL-MCNC: 0.9 MG/DL (ref 0.5–1.4)
GFR SERPLBLD CREATININE-BSD FMLA CKD-EPI: >60 ML/MIN/1.73/M2
GLUCOSE SERPL-MCNC: 96 MG/DL (ref 70–110)
POTASSIUM SERPL-SCNC: 4.7 MMOL/L (ref 3.5–5.1)
SODIUM SERPL-SCNC: 140 MMOL/L (ref 136–145)

## 2025-06-05 PROCEDURE — 82310 ASSAY OF CALCIUM: CPT

## 2025-06-05 PROCEDURE — 36415 COLL VENOUS BLD VENIPUNCTURE: CPT | Mod: PO

## 2025-06-05 PROCEDURE — 83880 ASSAY OF NATRIURETIC PEPTIDE: CPT

## 2025-06-16 ENCOUNTER — HOSPITAL ENCOUNTER (OUTPATIENT)
Dept: CARDIOLOGY | Facility: HOSPITAL | Age: 80
Discharge: HOME | End: 2025-06-16
Attending: INTERNAL MEDICINE
Payer: MEDICARE

## 2025-06-16 VITALS — HEIGHT: 72 IN | WEIGHT: 207 LBS | BODY MASS INDEX: 28.04 KG/M2

## 2025-06-16 DIAGNOSIS — I36.1 NONRHEUMATIC TRICUSPID VALVE REGURGITATION: ICD-10-CM

## 2025-06-16 DIAGNOSIS — R60.0 LOCALIZED EDEMA: ICD-10-CM

## 2025-06-16 PROCEDURE — 93306 TTE W/DOPPLER COMPLETE: CPT

## 2025-06-16 PROCEDURE — 93306 TTE W/DOPPLER COMPLETE: CPT | Mod: 26,,, | Performed by: INTERNAL MEDICINE

## 2025-06-17 LAB
AORTIC ROOT ANNULUS: 3.3 CM
AORTIC VALVE CUSP SEPERATION: 2.2 CM
APICAL FOUR CHAMBER EJECTION FRACTION: 63 %
APICAL TWO CHAMBER EJECTION FRACTION: 61 %
AV INDEX (PROSTH): 0.75
AV MEAN GRADIENT: 5 MMHG
AV PEAK GRADIENT: 9 MMHG
AV VALVE AREA BY VELOCITY RATIO: 2.8 CM²
AV VALVE AREA: 2.8 CM²
AV VELOCITY RATIO: 0.73
BSA FOR ECHO PROCEDURE: 2.18 M2
CV ECHO LV RWT: 0.43 CM
DOP CALC AO PEAK VEL: 1.5 M/S
DOP CALC AO VTI: 34.8 CM
DOP CALC LVOT AREA: 3.8 CM2
DOP CALC LVOT DIAMETER: 2.2 CM
DOP CALC LVOT PEAK VEL: 1.1 M/S
DOP CALC LVOT STROKE VOLUME: 98.8 CM3
DOP CALC MV VTI: 38.3 CM
DOP CALCLVOT PEAK VEL VTI: 26 CM
E WAVE DECELERATION TIME: 180 MSEC
E/A RATIO: 1.05
E/E' RATIO: 8 M/S
ECHO LV POSTERIOR WALL: 1.1 CM (ref 0.6–1.1)
FRACTIONAL SHORTENING: 35.3 % (ref 28–44)
INTERVENTRICULAR SEPTUM: 1.1 CM (ref 0.6–1.1)
IVC DIAMETER: 1.8 CM
IVRT: 88 MSEC
LEFT ATRIUM AREA SYSTOLIC (APICAL 2 CHAMBER): 27.6 CM2
LEFT ATRIUM AREA SYSTOLIC (APICAL 4 CHAMBER): 28.1 CM2
LEFT ATRIUM SIZE: 3.9 CM
LEFT ATRIUM VOLUME INDEX MOD: 51 ML/M2
LEFT ATRIUM VOLUME MOD: 110 ML
LEFT INTERNAL DIMENSION IN SYSTOLE: 3.3 CM (ref 2.1–4)
LEFT VENTRICLE DIASTOLIC VOLUME INDEX: 57.41 ML/M2
LEFT VENTRICLE DIASTOLIC VOLUME: 124 ML
LEFT VENTRICLE END DIASTOLIC VOLUME APICAL 2 CHAMBER: 118 ML
LEFT VENTRICLE END DIASTOLIC VOLUME APICAL 4 CHAMBER: 154 ML
LEFT VENTRICLE END SYSTOLIC VOLUME APICAL 2 CHAMBER: 107 ML
LEFT VENTRICLE END SYSTOLIC VOLUME APICAL 4 CHAMBER: 101 ML
LEFT VENTRICLE MASS INDEX: 99 G/M2
LEFT VENTRICLE SYSTOLIC VOLUME INDEX: 20.4 ML/M2
LEFT VENTRICLE SYSTOLIC VOLUME: 44 ML
LEFT VENTRICULAR INTERNAL DIMENSION IN DIASTOLE: 5.1 CM (ref 3.5–6)
LEFT VENTRICULAR MASS: 213.9 G
LV LATERAL E/E' RATIO: 7.9 M/S
LV SEPTAL E/E' RATIO: 8.8 M/S
LVED V (TEICH): 124 ML
LVES V (TEICH): 44.1 ML
LVOT MG: 2 MMHG
LVOT MV: 0.68 CM/S
MV MEAN GRADIENT: 1 MMHG
MV PEAK A VEL: 0.75 M/S
MV PEAK E VEL: 0.79 M/S
MV PEAK GRADIENT: 2 MMHG
MV VALVE AREA BY CONTINUITY EQUATION: 2.58 CM2
OHS CV RV/LV RATIO: 0.49 CM
OHS LV EJECTION FRACTION SIMPSONS BIPLANE MOD: 62 %
PISA TR MAX VEL: 2.5 M/S
PV MV: 0.93 M/S
PV PEAK GRADIENT: 7 MMHG
PV PEAK VELOCITY: 1.31 M/S
RA PRESSURE ESTIMATED: 3 MMHG
RIGHT VENTRICLE DIASTOLIC BASEL DIMENSION: 2.5 CM
RIGHT VENTRICULAR END-DIASTOLIC DIMENSION: 2.5 CM
RV TB RVSP: 6 MMHG
RV TISSUE DOPPLER FREE WALL SYSTOLIC VELOCITY 1 (APICAL 4 CHAMBER VIEW): 12.5 CM/S
TDI LATERAL: 0.1 M/S
TDI SEPTAL: 0.09 M/S
TDI: 0.1 M/S
TR MAX PG: 26 MMHG
TRICUSPID ANNULAR PLANE SYSTOLIC EXCURSION: 2.3 CM
TV REST PULMONARY ARTERY PRESSURE: 28 MMHG
Z-SCORE OF LEFT VENTRICULAR DIMENSION IN END DIASTOLE: -3.28
Z-SCORE OF LEFT VENTRICULAR DIMENSION IN END SYSTOLE: -2.11

## 2025-08-19 ENCOUNTER — TELEPHONE (OUTPATIENT)
Dept: FAMILY MEDICINE | Facility: CLINIC | Age: 80
End: 2025-08-19
Payer: MEDICARE

## 2025-09-03 ENCOUNTER — TELEPHONE (OUTPATIENT)
Dept: FAMILY MEDICINE | Facility: CLINIC | Age: 80
End: 2025-09-03
Payer: MEDICARE

## 2025-09-04 ENCOUNTER — OFFICE VISIT (OUTPATIENT)
Dept: FAMILY MEDICINE | Facility: CLINIC | Age: 80
End: 2025-09-04
Payer: MEDICARE

## 2025-09-04 VITALS
OXYGEN SATURATION: 97 % | HEIGHT: 73 IN | DIASTOLIC BLOOD PRESSURE: 60 MMHG | BODY MASS INDEX: 26.95 KG/M2 | HEART RATE: 77 BPM | SYSTOLIC BLOOD PRESSURE: 100 MMHG | WEIGHT: 203.38 LBS

## 2025-09-04 DIAGNOSIS — I10 ESSENTIAL (PRIMARY) HYPERTENSION: Primary | ICD-10-CM

## 2025-09-04 DIAGNOSIS — D69.2 SENILE PURPURA: ICD-10-CM

## 2025-09-04 DIAGNOSIS — Z98.1 HISTORY OF LUMBAR FUSION: ICD-10-CM

## 2025-09-04 DIAGNOSIS — N13.8 BPH WITH OBSTRUCTION/LOWER URINARY TRACT SYMPTOMS: ICD-10-CM

## 2025-09-04 DIAGNOSIS — N40.1 BPH WITH OBSTRUCTION/LOWER URINARY TRACT SYMPTOMS: ICD-10-CM

## 2025-09-04 DIAGNOSIS — M17.0 PRIMARY OSTEOARTHRITIS OF BOTH KNEES: ICD-10-CM

## 2025-09-04 DIAGNOSIS — R60.0 LOCALIZED EDEMA: ICD-10-CM

## 2025-09-04 DIAGNOSIS — M51.16 LUMBAR DISC DISEASE WITH RADICULOPATHY: ICD-10-CM

## 2025-09-04 DIAGNOSIS — I25.118 ATHEROSCLEROTIC HEART DISEASE OF NATIVE CORONARY ARTERY WITH OTHER FORMS OF ANGINA PECTORIS: ICD-10-CM

## 2025-09-04 DIAGNOSIS — E78.2 MIXED HYPERLIPIDEMIA: ICD-10-CM

## 2025-09-04 DIAGNOSIS — E80.4 GILBERT'S SYNDROME: ICD-10-CM

## 2025-09-04 DIAGNOSIS — Z95.1 HISTORY OF CORONARY ARTERY BYPASS GRAFT X 2: ICD-10-CM

## 2025-09-04 DIAGNOSIS — I36.1 NONRHEUMATIC TRICUSPID VALVE REGURGITATION: ICD-10-CM

## 2025-09-04 DIAGNOSIS — I27.20 PULMONARY HTN: ICD-10-CM

## 2025-09-04 PROCEDURE — 99214 OFFICE O/P EST MOD 30 MIN: CPT | Mod: S$GLB,,, | Performed by: FAMILY MEDICINE

## 2025-09-04 PROCEDURE — 3288F FALL RISK ASSESSMENT DOCD: CPT | Mod: CPTII,S$GLB,, | Performed by: FAMILY MEDICINE

## 2025-09-04 PROCEDURE — 1159F MED LIST DOCD IN RCRD: CPT | Mod: CPTII,S$GLB,, | Performed by: FAMILY MEDICINE

## 2025-09-04 PROCEDURE — 3078F DIAST BP <80 MM HG: CPT | Mod: CPTII,S$GLB,, | Performed by: FAMILY MEDICINE

## 2025-09-04 PROCEDURE — 3074F SYST BP LT 130 MM HG: CPT | Mod: CPTII,S$GLB,, | Performed by: FAMILY MEDICINE

## 2025-09-04 PROCEDURE — 1101F PT FALLS ASSESS-DOCD LE1/YR: CPT | Mod: CPTII,S$GLB,, | Performed by: FAMILY MEDICINE

## 2025-09-04 RX ORDER — TRIAMCINOLONE ACETONIDE 1 MG/G
CREAM TOPICAL 2 TIMES DAILY
COMMUNITY

## 2025-09-04 RX ORDER — KETOCONAZOLE 20 MG/G
CREAM TOPICAL DAILY
COMMUNITY